# Patient Record
Sex: FEMALE | Race: WHITE | Employment: OTHER | URBAN - METROPOLITAN AREA
[De-identification: names, ages, dates, MRNs, and addresses within clinical notes are randomized per-mention and may not be internally consistent; named-entity substitution may affect disease eponyms.]

---

## 2017-01-02 DIAGNOSIS — E10.9 TYPE 1 DIABETES, HBA1C GOAL < 8% (H): Primary | ICD-10-CM

## 2017-01-02 DIAGNOSIS — I10 ESSENTIAL HYPERTENSION WITH GOAL BLOOD PRESSURE LESS THAN 140/90: ICD-10-CM

## 2017-01-02 NOTE — TELEPHONE ENCOUNTER
Amlodipine      Last Written Prescription Date: 6/18/16  Last Fill Quantity: 30, # refills: 0    Last Office Visit with G, P or [facility] prescribing provider:  7/22/16   Future Office Visit:    Next 5 appointments (look out 90 days)     Jan 09, 2017  8:35 AM   Office Visit with David Finney MD   [facility] ([facility])    [address] [facility] [address] [phone]                    BP Readings from Last 3 Encounters:   07/22/16 106/60   04/25/16 124/64   04/08/16 110/58

## 2017-01-04 DIAGNOSIS — E03.9 HYPOTHYROIDISM, UNSPECIFIED TYPE: ICD-10-CM

## 2017-01-04 DIAGNOSIS — E10.22 TYPE 1 DIABETES MELLITUS WITH DIABETIC CHRONIC KIDNEY DISEASE, UNSPECIFIED CKD STAGE (H): ICD-10-CM

## 2017-01-04 LAB
ALBUMIN SERPL-MCNC: 4.2 G/DL (ref 3.4–5)
ANION GAP SERPL CALCULATED.3IONS-SCNC: 8 MMOL/L (ref 3–14)
BUN SERPL-MCNC: 23 MG/DL (ref 7–30)
CALCIUM SERPL-MCNC: 9.7 MG/DL (ref 8.5–10.1)
CHLORIDE SERPL-SCNC: 98 MMOL/L (ref 94–109)
CO2 SERPL-SCNC: 30 MMOL/L (ref 20–32)
CREAT SERPL-MCNC: 1.42 MG/DL (ref 0.52–1.04)
GFR SERPL CREATININE-BSD FRML MDRD: 35 ML/MIN/1.7M2
GLUCOSE SERPL-MCNC: 233 MG/DL (ref 70–99)
HBA1C MFR BLD: 6.8 % (ref 4.3–6)
PHOSPHATE SERPL-MCNC: 2.2 MG/DL (ref 2.5–4.5)
POTASSIUM SERPL-SCNC: 3.8 MMOL/L (ref 3.4–5.3)
SODIUM SERPL-SCNC: 136 MMOL/L (ref 133–144)
T4 FREE SERPL-MCNC: 0.83 NG/DL (ref 0.76–1.46)
TSH SERPL DL<=0.005 MIU/L-ACNC: 28.57 MU/L (ref 0.4–4)

## 2017-01-04 PROCEDURE — 84443 ASSAY THYROID STIM HORMONE: CPT | Performed by: FAMILY MEDICINE

## 2017-01-04 PROCEDURE — 83036 HEMOGLOBIN GLYCOSYLATED A1C: CPT | Performed by: FAMILY MEDICINE

## 2017-01-04 PROCEDURE — 80069 RENAL FUNCTION PANEL: CPT | Performed by: FAMILY MEDICINE

## 2017-01-04 PROCEDURE — 36415 COLL VENOUS BLD VENIPUNCTURE: CPT | Performed by: FAMILY MEDICINE

## 2017-01-04 PROCEDURE — 84439 ASSAY OF FREE THYROXINE: CPT | Performed by: FAMILY MEDICINE

## 2017-01-04 RX ORDER — AMLODIPINE BESYLATE 2.5 MG/1
5 TABLET ORAL DAILY
Qty: 30 TABLET | Refills: 0 | Status: SHIPPED | OUTPATIENT
Start: 2017-01-04 | End: 2017-01-09

## 2017-01-09 ENCOUNTER — OFFICE VISIT (OUTPATIENT)
Dept: FAMILY MEDICINE | Facility: CLINIC | Age: 82
End: 2017-01-09
Payer: COMMERCIAL

## 2017-01-09 VITALS
OXYGEN SATURATION: 90 % | TEMPERATURE: 96.8 F | HEART RATE: 72 BPM | BODY MASS INDEX: 23.04 KG/M2 | DIASTOLIC BLOOD PRESSURE: 70 MMHG | WEIGHT: 130 LBS | HEIGHT: 63 IN | SYSTOLIC BLOOD PRESSURE: 136 MMHG

## 2017-01-09 DIAGNOSIS — N18.30 CKD (CHRONIC KIDNEY DISEASE) STAGE 3, GFR 30-59 ML/MIN (H): ICD-10-CM

## 2017-01-09 DIAGNOSIS — R60.0 PERIPHERAL EDEMA: ICD-10-CM

## 2017-01-09 DIAGNOSIS — E78.5 HYPERLIPIDEMIA LDL GOAL <100: ICD-10-CM

## 2017-01-09 DIAGNOSIS — E10.22 TYPE 1 DIABETES MELLITUS WITH DIABETIC CHRONIC KIDNEY DISEASE, UNSPECIFIED CKD STAGE (H): Primary | ICD-10-CM

## 2017-01-09 DIAGNOSIS — E03.9 HYPOTHYROIDISM, UNSPECIFIED TYPE: ICD-10-CM

## 2017-01-09 DIAGNOSIS — J42 CHRONIC BRONCHITIS, UNSPECIFIED CHRONIC BRONCHITIS TYPE (H): ICD-10-CM

## 2017-01-09 DIAGNOSIS — I10 ESSENTIAL HYPERTENSION WITH GOAL BLOOD PRESSURE LESS THAN 140/90: ICD-10-CM

## 2017-01-09 DIAGNOSIS — F33.0 MAJOR DEPRESSIVE DISORDER, RECURRENT EPISODE, MILD (H): ICD-10-CM

## 2017-01-09 PROCEDURE — 99214 OFFICE O/P EST MOD 30 MIN: CPT | Performed by: FAMILY MEDICINE

## 2017-01-09 RX ORDER — CLONIDINE HYDROCHLORIDE 0.1 MG/1
TABLET ORAL
Qty: 180 TABLET | Refills: 1 | Status: SHIPPED | OUTPATIENT
Start: 2017-01-09 | End: 2017-09-10

## 2017-01-09 RX ORDER — BUDESONIDE AND FORMOTEROL FUMARATE DIHYDRATE 160; 4.5 UG/1; UG/1
1 AEROSOL RESPIRATORY (INHALATION) 2 TIMES DAILY
Qty: 3 INHALER | Refills: 1 | Status: SHIPPED | OUTPATIENT
Start: 2017-01-09 | End: 2017-10-23

## 2017-01-09 RX ORDER — AMLODIPINE BESYLATE 2.5 MG/1
5 TABLET ORAL DAILY
Qty: 90 TABLET | Refills: 1 | Status: SHIPPED | OUTPATIENT
Start: 2017-01-09 | End: 2017-06-15

## 2017-01-09 RX ORDER — METOPROLOL SUCCINATE 50 MG/1
50 TABLET, EXTENDED RELEASE ORAL DAILY
Qty: 90 TABLET | Refills: 1 | Status: SHIPPED | OUTPATIENT
Start: 2017-01-09 | End: 2017-10-23

## 2017-01-09 RX ORDER — SIMVASTATIN 20 MG
20 TABLET ORAL AT BEDTIME
Qty: 90 TABLET | Refills: 1 | Status: SHIPPED | OUTPATIENT
Start: 2017-01-09 | End: 2017-07-09

## 2017-01-09 RX ORDER — FUROSEMIDE 20 MG
20 TABLET ORAL DAILY
Qty: 90 TABLET | Refills: 1 | Status: SHIPPED | OUTPATIENT
Start: 2017-01-09 | End: 2017-06-28

## 2017-01-09 RX ORDER — LEVOTHYROXINE SODIUM 100 UG/1
100 TABLET ORAL DAILY
Qty: 90 TABLET | Refills: 1 | Status: SHIPPED | OUTPATIENT
Start: 2017-01-09 | End: 2017-10-23

## 2017-01-09 RX ORDER — LOSARTAN POTASSIUM 25 MG/1
12.5 TABLET ORAL DAILY
Qty: 45 TABLET | Refills: 1 | Status: SHIPPED | OUTPATIENT
Start: 2017-01-09 | End: 2017-10-23

## 2017-01-09 RX ORDER — SERTRALINE HYDROCHLORIDE 25 MG/1
25 TABLET, FILM COATED ORAL DAILY
Qty: 90 TABLET | Refills: 1 | Status: SHIPPED | OUTPATIENT
Start: 2017-01-09 | End: 2017-10-23

## 2017-01-09 NOTE — NURSING NOTE
"Chief Complaint   Patient presents with     Diabetes       Initial /70 mmHg  Pulse 72  Temp(Src) 96.8  F (36  C) (Oral)  Ht 5' 3\" (1.6 m)  Wt 130 lb (58.968 kg)  BMI 23.03 kg/m2  SpO2 90% Estimated body mass index is 23.03 kg/(m^2) as calculated from the following:    Height as of this encounter: 5' 3\" (1.6 m).    Weight as of this encounter: 130 lb (58.968 kg).  BP completed using cuff size: lucy Marie CMA    "

## 2017-01-09 NOTE — PROGRESS NOTES
"SUBJECTIVE:  Claudia Presley, a 81 year old female scheduled an appointment to discuss the following issues:  Follow-up dm, htn, hypothyroid, copd, depression and high cholesterol.  closely involved in cares and patient doesn't drive.   Water intake ok. 2 decaf coffee in AM. No pop.   No feet changes. No bs<50 or >400. Blood sugar QID. Sleep ok. No chest pain or shortness of breath. No nausea, vomiting or diarrhea. No black or bloody stools.   Appetite Ok. Emotionally ok. 3 children 1 [place]/1 [place]. 1 son in [place].  Pill box set-up.    Past Medical History   Diagnosis Date     COPD (chronic obstructive pulmonary disease) (H)      Hypothyroidism      Peripheral edema      Osteopenia      Osteoporosis      Depression      Spinal stenosis      Type I (juvenile type) diabetes mellitus without mention of complication, not stated as uncontrolled      Diverticulosis of colon 2010     seen on colonoscopy     Hypertension        Past Surgical History   Procedure Laterality Date     Tonsillectomy       Breast lumpectomy, rt/lt       Breast Lumpectomy RT/LT     Phacoemulsification with standard intraocular lens implant  3/2007; 3/2007     left eye; right eye (STS)     Cataract iol, rt/lt         Family History   Problem Relation Age of Onset     Cancer - colorectal Father      PASSED AWAY FROM COLON CA     Alzheimer Disease Brother      PICKS DISEASE     Alzheimer Disease Sister      ON ARICEPT     Depression Sister      POST PARTUM     Respiratory Brother      COPD     Arthritis Sister      OSTEOPOROSIS Sister      Thyroid Disease Sister      Alzheimer Disease Mother      Breast Cancer Maternal Aunt        Social History   Substance Use Topics     Smoking status: Former Smoker     Types: Cigarettes     Quit date: 01/01/1998     Smokeless tobacco: Never Used      Comment: 1998     Alcohol Use: No       ROS:  OBJECTIVE:  /70 mmHg  Pulse 72  Temp(Src) 96.8  F (36  C) (Oral)  Ht 5' 3\" (1.6 m)  Wt 130 lb " (58.968 kg)  BMI 23.03 kg/m2  SpO2 90%  EXAM:  GENERAL APPEARANCE: healthy, alert and no distress  EYES: EOMI,  PERRL  NECK: no adenopathy, no asymmetry, masses, or scars and thyroid normal to palpation  RESP: lungs clear to auscultation - no rales, rhonchi or wheezes  CV: regular rates and rhythm, normal S1 S2, no S3 or S4 and no murmur, click or rub -  ABDOMEN:  soft, nontender, no HSM or masses and bowel sounds normal  MS: extremities normal- no gross deformities noted, no evidence of inflammation in joints, FROM in all extremities.  NEURO: good pulses/sensation bilateral feet.   PSYCH: mentation poor historian at times. and affect normal/bright  PSYCH: mild anxious.     ASSESSMENT / PLAN:  (E10.22,  N18.9) Type 1 diabetes mellitus with diabetic chronic kidney disease, unspecified CKD stage (H)  (primary encounter diagnosis)  Comment: stable  Plan: insulin glargine (LANTUS SOLOSTAR) 100 UNIT/ML         injection, insulin lispro (HUMALOG KWIKPEN) 100        UNIT/ML injection        Continue long term insulin and closely monitor. Recheck in 6 months      (E03.9) Hypothyroidism, unspecified type  Comment: a little high  Plan: levothyroxine (SYNTHROID/LEVOTHROID) 100 MCG         tablet        Concerns about not taking pills everyday and I don't want to over treatment with weight loss. Recheck in 6 months  Pill box.     (I10) Essential hypertension with goal blood pressure less than 140/90  Plan: amLODIPine (NORVASC) 2.5 MG tablet, metoprolol         (TOPROL-XL) 50 MG 24 hr tablet, cloNIDine         (CATAPRES) 0.1 MG tablet        Stable.     (J42) Chronic bronchitis, unspecified chronic bronchitis type (H)  Comment: stable  Plan: budesonide-formoterol (SYMBICORT) 160-4.5         MCG/ACT Inhaler            (F33.0) Major depressive disorder, recurrent episode, mild (H)  Comment: stable  Plan: sertraline (ZOLOFT) 25 MG tablet        Reveiwed risks and side effects of medication  Recheck in 6 months  Sooner if worse.  If SUICIAL IDEATION OR HOMOCIDAL IDEATION OR HERMELINDA TO ER.   Discussed dementia concerns with  today. More home health if needed. Call/email with questions/concerns    (R60.9) Peripheral edema  Comment: stable   Plan: furosemide (LASIX) 20 MG tablet        Prn especially with cr up.     (N18.3) CKD (chronic kidney disease) stage 3, GFR 30-59 ml/min  Comment: worse  Plan: losartan (COZAAR) 25 MG tablet        Recheck in 6 months  More water/less caffeine. Return to clinic sooner if urine changes/ rapids weight gain/worsening swelling. Monitor blood pressure for over treatment.    (E78.5) Hyperlipidemia LDL goal <100  Comment: stable in past  Plan: simvastatin (ZOCOR) 20 MG tablet        Recheck in 6 months      David Finney

## 2017-02-09 DIAGNOSIS — I10 ESSENTIAL HYPERTENSION WITH GOAL BLOOD PRESSURE LESS THAN 140/90: Primary | ICD-10-CM

## 2017-02-09 RX ORDER — AMLODIPINE BESYLATE 2.5 MG/1
5 TABLET ORAL DAILY
Qty: 90 TABLET | Refills: 1 | Status: CANCELLED | OUTPATIENT
Start: 2017-02-09

## 2017-02-09 NOTE — TELEPHONE ENCOUNTER
Amlodipine      Last Written Prescription Date: 1/2/17  Last Fill Quantity: 90, # refills: 1    Last Office Visit with [facility], P or [facility] prescribing provider:  2/10/17   Future Office Visit:    Next 5 appointments (look out 90 days)     Feb 10, 2017  1:20 PM   [facility]   [facility]    [address] [facility] [address] [phone]                    BP Readings from Last 3 Encounters:   01/09/17 136/70   07/22/16 106/60   04/25/16 124/64

## 2017-02-10 ENCOUNTER — OFFICE VISIT (OUTPATIENT)
Dept: FAMILY MEDICINE | Facility: CLINIC | Age: 82
End: 2017-02-10
Payer: COMMERCIAL

## 2017-02-10 VITALS
OXYGEN SATURATION: 90 % | BODY MASS INDEX: 23.03 KG/M2 | DIASTOLIC BLOOD PRESSURE: 56 MMHG | HEART RATE: 62 BPM | TEMPERATURE: 96.8 F | SYSTOLIC BLOOD PRESSURE: 102 MMHG | WEIGHT: 130 LBS

## 2017-02-10 DIAGNOSIS — E03.9 HYPOTHYROIDISM, UNSPECIFIED TYPE: ICD-10-CM

## 2017-02-10 DIAGNOSIS — F33.1 MAJOR DEPRESSIVE DISORDER, RECURRENT EPISODE, MODERATE (H): ICD-10-CM

## 2017-02-10 DIAGNOSIS — E10.22 TYPE 1 DIABETES MELLITUS WITH DIABETIC CHRONIC KIDNEY DISEASE, UNSPECIFIED CKD STAGE (H): Primary | ICD-10-CM

## 2017-02-10 DIAGNOSIS — Z23 NEED FOR PNEUMOCOCCAL VACCINE: ICD-10-CM

## 2017-02-10 DIAGNOSIS — I10 HYPERTENSION GOAL BP (BLOOD PRESSURE) < 140/90: ICD-10-CM

## 2017-02-10 PROCEDURE — 99214 OFFICE O/P EST MOD 30 MIN: CPT | Mod: 25 | Performed by: FAMILY MEDICINE

## 2017-02-10 PROCEDURE — 90670 PCV13 VACCINE IM: CPT | Performed by: FAMILY MEDICINE

## 2017-02-10 PROCEDURE — G0009 ADMIN PNEUMOCOCCAL VACCINE: HCPCS | Performed by: FAMILY MEDICINE

## 2017-02-10 NOTE — TELEPHONE ENCOUNTER
Left message on pharmacy vm that they should refill norvasc off of 1-9-17 prescription we sent them.  Philomena Collins RN

## 2017-02-10 NOTE — PROGRESS NOTES
SUBJECTIVE:  Claudia Presley, a 81 year old female scheduled an appointment to discuss the following issues:  Follow-up dm, htn, asthma, high cholesterol and memory difficulties.   Weight loss concerns - per  - appetite ok. No nausea, vomiting or diarrhea or black/blood stools. No abdominal pain. Sleep ok. zoloft helpful.   Overall breathing ok. Worse hot/humid. Rare albuterol needed. No chest pain. No leg swelling.    believes blood sugars a little too low. No LOSS OF CONSCIENCE. Average 100-150.   Patient will with pill box.  helpful.   Medical, social, surgical, and family histories reviewed.    ROS:      OBJECTIVE:  /56 mmHg  Pulse 62  Temp(Src) 96.8  F (36  C) (Oral)  Wt 130 lb (58.968 kg)  SpO2 90%  EXAM:  GENERAL APPEARANCE: healthy, alert and no distress  EYES: EOMI,  PERRL  NECK: no adenopathy, no asymmetry, masses, or scars and thyroid normal to palpation  RESP: lungs clear to auscultation - no rales, rhonchi or wheezes  CV: regular rates and rhythm, normal S1 S2, no S3 or S4 and no murmur, click or rub -  ABDOMEN:  soft, nontender, no HSM or masses and bowel sounds normal  MS: extremities normal- no gross deformities noted, no evidence of inflammation in joints, FROM in all extremities.  NEURO: Normal strength and tone, sensory exam grossly normal, mentation somewhat poor historian  speech normal  PSYCH: mentation appears normal and affect normal/bright    ASSESSMENT / PLAN:  (E10.22,  N18.9) Type 1 diabetes mellitus with diabetic chronic kidney disease, unspecified CKD stage (H)  (primary encounter diagnosis)  Comment: over treatment. Concerns about hypoglycemia  Plan: glucagon 1 MG kit, insulin glargine (LANTUS         SOLOSTAR) 100 UNIT/ML injection, insulin lispro        (HUMALOG KWIKPEN) 100 UNIT/ML injection        Lower ALL insulin by 2 units. Consider back to dm ed but patient found new changes too confusing with memory issues.  helpful. Consistent meal  times and continue closely monitor - patient frequently checking sugars and writing down. Recheck in 4 months  Sooner if worse/new issues. Prescription glucagon pen.         (F33.1) Major depressive disorder, recurrent episode, moderate (H)  Comment: stable  Plan: zoloft helpful. If SUICIAL IDEATION OR HOMOCIDAL IDEATION OR HERMELINDA TO ER. Recheck in 4 months      (E03.9) Hypothyroidism, unspecified type  Plan: Recheck in 4 months  Consider higher dosage if worse.     (Z23) Need for pneumococcal vaccine  Plan: PNEUMOCOCCAL CONJ VACCINE 13 VALENT IM (PREVNAR        13)            David Finney MD

## 2017-02-10 NOTE — NURSING NOTE
"Chief Complaint   Patient presents with     Diabetes       Initial /56 mmHg  Pulse 62  Temp(Src) 96.8  F (36  C) (Oral)  Wt 130 lb (58.968 kg)  SpO2 90% Estimated body mass index is 23.03 kg/(m^2) as calculated from the following:    Height as of 1/9/17: 5' 3\" (1.6 m).    Weight as of this encounter: 130 lb (58.968 kg).  Medication Reconciliation: complete   Philomena Marie CMA  Screening Questionnaire for Adult Immunization    Are you sick today?   No   Do you have allergies to medications, food, a vaccine component or latex?   No   Have you ever had a serious reaction after receiving a vaccination?   No   Do you have a long-term health problem with heart disease, lung disease, asthma, kidney disease, metabolic disease (e.g. diabetes), anemia, or other blood disorder?   diabetes   Do you have cancer, leukemia, HIV/AIDS, or any other immune system problem?   No   In the past 3 months, have you taken medications that affect  your immune system, such as prednisone, other steroids, or anticancer drugs; drugs for the treatment of rheumatoid arthritis, Crohn s disease, or psoriasis; or have you had radiation treatments?   No   Have you had a seizure, or a brain or other nervous system problem?   No   During the past year, have you received a transfusion of blood or blood     products, or been given immune (gamma) globulin or antiviral drug?   No   For women: Are you pregnant or is there a chance you could become        pregnant during the next month?   No   Have you received any vaccinations in the past 4 weeks?   No     Immunization questionnaire was positive for at least one answer.  Notified diabetes.      MNVFC doesn't apply on this patient    Per orders of Dr. Finney, injection of Prevnar 13  given by Philomena Marie. Patient instructed to remain in clinic for 20 minutes afterwards, and to report any adverse reaction to me immediately.       Screening performed by Philomena Marie on 2/10/2017 at 1:30 PM.    "

## 2017-03-13 DIAGNOSIS — E10.22 TYPE 1 DIABETES MELLITUS WITH DIABETIC CHRONIC KIDNEY DISEASE (H): Primary | ICD-10-CM

## 2017-03-13 NOTE — TELEPHONE ENCOUNTER
One touch ultra blue in vitro strip         Last Written Prescription Date: 11/21/16  Last Fill Quantity: 3 boxes, # refills: 12  Last Office Visit with G, P or [facility] prescribing provider:  2/10/17        BP Readings from Last 3 Encounters:   02/10/17 102/56   01/09/17 136/70   07/22/16 106/60     Lab Results   Component Value Date    MICROL 65 04/08/2016     No results found for: MICROALBUMIN  Creatinine   Date Value Ref Range Status   01/04/2017 1.42 (H) 0.52 - 1.04 mg/dL Final   ]  GFR Estimate   Date Value Ref Range Status   01/04/2017 35 (L) >60 mL/min/1.7m2 Final     Comment:     Non  GFR Calc   07/15/2016 48 (L) >60 mL/min/1.7m2 Final     Comment:     Non  GFR Calc   07/28/2015 42 (L) >60 mL/min/1.7m2 Final     Comment:     Non  GFR Calc     GFR Estimate If Black   Date Value Ref Range Status   01/04/2017 43 (L) >60 mL/min/1.7m2 Final     Comment:      GFR Calc   07/15/2016 58 (L) >60 mL/min/1.7m2 Final     Comment:      GFR Calc   07/28/2015 51 (L) >60 mL/min/1.7m2 Final     Comment:      GFR Calc     Lab Results   Component Value Date    CHOL 163 07/15/2016     Lab Results   Component Value Date    HDL 80 07/15/2016     Lab Results   Component Value Date    LDL 68 07/15/2016     Lab Results   Component Value Date    TRIG 75 07/15/2016     Lab Results   Component Value Date    CHOLHDLRATIO 2.2 07/28/2015     Lab Results   Component Value Date    AST 40 07/24/2012     Lab Results   Component Value Date    ALT 10 07/24/2012     Lab Results   Component Value Date    A1C 6.8 01/04/2017    A1C 7.9 07/15/2016    A1C 8.1 02/23/2016    A1C 7.0 07/28/2015    A1C 7.3 02/16/2015     Potassium   Date Value Ref Range Status   01/04/2017 3.8 3.4 - 5.3 mmol/L Final

## 2017-04-19 ENCOUNTER — TELEPHONE (OUTPATIENT)
Dept: FAMILY MEDICINE | Facility: CLINIC | Age: 82
End: 2017-04-19

## 2017-04-19 DIAGNOSIS — E10.22 TYPE 1 DIABETES MELLITUS WITH DIABETIC CHRONIC KIDNEY DISEASE, UNSPECIFIED CKD STAGE (H): ICD-10-CM

## 2017-04-19 NOTE — TELEPHONE ENCOUNTER
Pt. Is requesting a refill of her Lantus diabetic pens please. Please change her pharmacy from our [facility] to the [facility] on [address] [facility] in [place]. Please call the patient when this has been done.    Thank you

## 2017-04-19 NOTE — TELEPHONE ENCOUNTER
Should be 11 units - I'm concerned about over treatment and hypoglycemia so down from 13 to 11 now thanks. David Finney MD recheck labs/meds in 2 months.

## 2017-04-19 NOTE — TELEPHONE ENCOUNTER
Refilled per RN refill protocol.  Left message on vm that refill was done to [facility].  Philomena Collins RN

## 2017-06-06 ENCOUNTER — OFFICE VISIT (OUTPATIENT)
Dept: FAMILY MEDICINE | Facility: CLINIC | Age: 82
End: 2017-06-06
Payer: COMMERCIAL

## 2017-06-06 VITALS
SYSTOLIC BLOOD PRESSURE: 130 MMHG | OXYGEN SATURATION: 98 % | WEIGHT: 138 LBS | DIASTOLIC BLOOD PRESSURE: 80 MMHG | TEMPERATURE: 97.2 F | HEART RATE: 62 BPM | BODY MASS INDEX: 24.45 KG/M2

## 2017-06-06 DIAGNOSIS — L03.115 CELLULITIS OF LEG, RIGHT: ICD-10-CM

## 2017-06-06 DIAGNOSIS — R21 RASH: Primary | ICD-10-CM

## 2017-06-06 PROCEDURE — 99214 OFFICE O/P EST MOD 30 MIN: CPT | Performed by: FAMILY MEDICINE

## 2017-06-06 RX ORDER — TRIAMCINOLONE ACETONIDE 0.25 MG/ML
LOTION TOPICAL 2 TIMES DAILY PRN
Qty: 60 ML | Refills: 1 | Status: SHIPPED | OUTPATIENT
Start: 2017-06-06 | End: 2017-07-10

## 2017-06-06 RX ORDER — CETIRIZINE HYDROCHLORIDE 10 MG/1
10 TABLET ORAL EVERY EVENING
Qty: 30 TABLET | Refills: 1 | Status: SHIPPED | OUTPATIENT
Start: 2017-06-06 | End: 2017-08-14

## 2017-06-06 RX ORDER — DOXYCYCLINE 100 MG/1
100 CAPSULE ORAL 2 TIMES DAILY
Qty: 20 CAPSULE | Refills: 0 | Status: SHIPPED | OUTPATIENT
Start: 2017-06-06 | End: 2017-06-15

## 2017-06-06 NOTE — PROGRESS NOTES
SUBJECTIVE:  Claudia Presley, a 81 year old female scheduled an appointment to discuss the following issues:  Rash  Started right leg now spreading to arms/other leg. No pain. Itching. No fevers or chills.   Similar issues last fall.   Dog and  - no rashes. right legs worse past couple weeks.   No shortness of breath. No nausea, vomiting or diarrhea. Appetite ok. Blood sugars ok.   Medical, social, surgical, and family histories reviewed.    ROS:    OBJECTIVE:  /80  Pulse 62  Temp 97.2  F (36.2  C) (Oral)  Wt 138 lb (62.6 kg)  SpO2 98%  BMI 24.45 kg/m2  EXAM:  GENERAL APPEARANCE: healthy, alert and no distress  RESP: lungs clear to auscultation - no rales, rhonchi or wheezes  CV: regular rates and rhythm, normal S1 S2, no S3 or S4 and no murmur, click or rub -  ABDOMEN:  soft, nontender, no HSM or masses and bowel sounds normal  MS: extremities normal- no gross deformities noted, no evidence of inflammation in joints, FROM in all extremities.  SKIN: redness/weeping right mid shin with mild swelling/tenderness. Macular rash on bilateral forearms. Some pickers nodules  PSYCH: mentation appears normal and affect normal/bright    ASSESSMENT / PLAN:  (R21) Rash  (primary encounter diagnosis)  Comment: unclear etiology  Plan: cetirizine (ZYRTEC) 10 MG tablet, triamcinolone        acetonide 0.025 % LOTN, DERMATOLOGY REFERRAL        Avoid scratching and follow-up dermatology. Limit bathing/soap. Expected course and warning signs reviewed. Call/email with questions/concerns. .Reveiwed risks and side effects of medication      (L03.115) Cellulitis of leg, right  Plan: doxycycline (VIBRAMYCIN) 100 MG capsule,         DERMATOLOGY REFERRAL        Reveiwed risks and side effects of medication  Avoid scratching. Elevate and kurlex wrap done. To ER if worsening pain/swelling/ fevers or chills.   Return to clinic 6 weeks dm/meds/labs - sooner if new issues/concerns.     David Finney MD

## 2017-06-06 NOTE — NURSING NOTE
"Chief Complaint   Patient presents with     Derm Problem     arms and legs        Initial /80  Pulse 62  Temp 97.2  F (36.2  C) (Oral)  Wt 138 lb (62.6 kg)  SpO2 98%  BMI 24.45 kg/m2 Estimated body mass index is 24.45 kg/(m^2) as calculated from the following:    Height as of 1/9/17: 5' 3\" (1.6 m).    Weight as of this encounter: 138 lb (62.6 kg).  Medication Reconciliation: complete   Philomena Marie, CMA    "

## 2017-06-06 NOTE — MR AVS SNAPSHOT
After Visit Summary   6/6/2017    Claudia Presley    MRN: [id number]           Patient Information     Date Of Birth          1935        Visit Information        Provider Department      6/6/2017 10:50 AM [place] Finney MD [facility]        Today's Diagnoses     Rash    -  1    Cellulitis of leg, right           Follow-ups after your visit        Additional Services     DERMATOLOGY REFERRAL       Your provider has referred you to: [facility] - Mee Torres [phone]   [email or web address]    Please be aware that coverage of these services is subject to the terms and limitations of your health insurance plan.  Call member services at your health plan with any benefit or coverage questions.      Please bring the following with you to your appointment:    (1) Any X-Rays, CTs or MRIs which have been performed.  Contact the facility where they were done to arrange for  prior to your scheduled appointment.    (2) List of current medications  (3) This referral request   (4) Any documents/labs given to you for this referral                  Who to contact     If you have questions or need follow up information about today's clinic visit or your schedule please contact [facility] directly at [phone].  Normal or non-critical lab and imaging results will be communicated to you by MyChart, letter or phone within 4 business days after the clinic has received the results. If you do not hear from us within 7 days, please contact the clinic through MyChart or phone. If you have a critical or abnormal lab result, we will notify you by phone as soon as possible.  Submit refill requests through [employer] or call your pharmacy and they will forward the refill request to us. Please allow 3 business days for your refill to be completed.          Additional Information About Your Visit        MyChart Information     [employer] lets you  "send messages to your doctor, view your test results, renew your prescriptions, schedule appointments and more. To sign up, go to [email or web address] . Click on \"Log in\" on the left side of the screen, which will take you to the Welcome page. Then click on \"Sign up Now\" on the right side of the page.     You will be asked to enter the access code listed below, as well as some personal information. Please follow the directions to create your username and password.     Your access code is: [email or web address]  Expires: 2017 11:14 AM     Your access code will  in 90 days. If you need help or a new code, please call your [place] clinic or [phone].        Care EveryWhere ID     This is your Care EveryWhere ID. This could be used by other organizations to access your [place] medical records  [id number]        Your Vitals Were     Pulse Temperature Pulse Oximetry BMI (Body Mass Index)          62 97.2  F (36.2  C) (Oral) 98% 24.45 kg/m2         Blood Pressure from Last 3 Encounters:   17 130/80   02/10/17 102/56   17 136/70    Weight from Last 3 Encounters:   17 138 lb (62.6 kg)   02/10/17 130 lb (59 kg)   17 130 lb (59 kg)              We Performed the Following     DERMATOLOGY REFERRAL          Today's Medication Changes          These changes are accurate as of: 17 11:14 AM.  If you have any questions, ask your nurse or doctor.               Start taking these medicines.        Dose/Directions    cetirizine 10 MG tablet   Commonly known as:  zyrTEC   Used for:  Rash        Dose:  10 mg   Take 1 tablet (10 mg) by mouth every evening As needed for itching/rash   Quantity:  30 tablet   Refills:  1       doxycycline 100 MG capsule   Commonly known as:  VIBRAMYCIN   Used for:  Cellulitis of leg, right        Dose:  100 mg   Take 1 capsule (100 mg) by mouth 2 times daily Take the medication with food.   Quantity:  20 capsule   Refills:  0       triamcinolone acetonide 0.025 % Lotn "   Used for:  Rash        Externally apply topically 2 times daily as needed   Quantity:  60 mL   Refills:  1            Where to get your medicines      These medications were sent to [id number] - [place], [place] - [address]  [address] [address]     Phone:  [phone]     cetirizine 10 MG tablet    doxycycline 100 MG capsule    triamcinolone acetonide 0.025 % Lotn                Primary Care Provider Office Phone # Fax #    [place] Finney [phone]       [facility] [address] [address] [facility] [address] [address]        Thank you!     Thank you for choosing [facility]  for your care. Our goal is always to provide you with excellent care. Hearing back from our patients is one way we can continue to improve our services. Please take a few minutes to complete the written survey that you may receive in the mail after your visit with us. Thank you!             Your Updated Medication List - Protect others around you: Learn how to safely use, store and throw away your medicines at www.disposemymeds.org.          This list is accurate as of: 6/6/17 11:14 AM.  Always use your most recent med list.                   Brand Name Dispense Instructions for use    amLODIPine 2.5 MG tablet    NORVASC    90 tablet    Take 2 tablets (5 mg) by mouth daily Pharmacy ok to hold prescription until due       aspirin 81 MG tablet      1 TABLET DAILY       blood glucose monitoring test strip    no brand specified    3 Box    In Vitro route 4 times daily. Dispense One Touch Ultra test strips or per patient/insurance preference       budesonide-formoterol 160-4.5 MCG/ACT Inhaler    SYMBICORT    3 Inhaler    Inhale 1 puff into the lungs 2 times daily Daily inhaler for copd Pharmacy ok to hold prescription until due       cetirizine 10 MG tablet    zyrTEC    30 tablet    Take 1 tablet (10 mg) by mouth every evening As needed for itching/rash       cloNIDine 0.1  MG tablet    CATAPRES    180 tablet    ONE tablet twice  DAILY for blood pressure Pharmacy ok to hold prescription until due       doxycycline 100 MG capsule    VIBRAMYCIN    20 capsule    Take 1 capsule (100 mg) by mouth 2 times daily Take the medication with food.       furosemide 20 MG tablet    LASIX    90 tablet    Take 1 tablet (20 mg) by mouth daily As needed for blood pressure or leg swelling Pharmacy ok to hold prescription until due       glucagon 1 MG kit     1 mg    Inject 1 mg Subcutaneous once for 1 dose For severe hypoglycemia       HumaLOG KWIKpen 100 UNIT/ML injection   Generic drug:  insulin lispro     1 mL    11 units before breakfast, 11 units before lunch, 11 units before dinner 3 month supply please       insulin glargine 100 UNIT/ML injection    LANTUS SOLOSTAR    3 mL    Inject 11 Units Subcutaneous At Bedtime       * insulin pen needle 29G X 12.7MM     400 each    1 Units 4 times daily (before meals and nightly). Please dispense Rachel Needles       * insulin pen needle 32G X 4 MM    BD RACHEL U/F    400 each    Use four times daily.       ipratropium 17 MCG/ACT Inhaler    ATROVENT HFA    12.9 Inhaler    Inhale 2 puffs into the lungs 4 times daily Pharmacy ok to hold prescription until due       levothyroxine 100 MCG tablet    SYNTHROID/LEVOTHROID    90 tablet    Take 1 tablet (100 mcg) by mouth daily For thyroid Pharmacy ok to hold prescription until due       [employer] FINEPOINT LANCETS Misc     100 each    Use to test blood sugars 4 times daily or as directed.  Brand per patient/insurance preference       losartan 25 MG tablet    COZAAR    45 tablet    Take 0.5 tablets (12.5 mg) by mouth daily For blood pressure with breakfast       meclizine 25 MG tablet    ANTIVERT     1 TABLET 3 TIMES DAILY AS NEEDED       metoprolol 50 MG 24 hr tablet    TOPROL-XL    90 tablet    Take 1 tablet (50 mg) by mouth daily For blood pressure and lowers pulse. Pharmacy ok to hold prescription until due        sertraline 25 MG tablet    ZOLOFT    90 tablet    Take 1 tablet (25 mg) by mouth daily for energy/depression and anxiety       simvastatin 20 MG tablet    ZOCOR    90 tablet    Take 1 tablet (20 mg) by mouth At Bedtime For cholesterol Pharmacy ok to hold prescription until due       triamcinolone acetonide 0.025 % Lotn     60 mL    Externally apply topically 2 times daily as needed       TYLENOL CAPS 500 MG OR      1 CAPSULE EVERY 4 HOURS AS NEEDED       * Notice:  This list has 2 medication(s) that are the same as other medications prescribed for you. Read the directions carefully, and ask your doctor or other care provider to review them with you.

## 2017-06-15 DIAGNOSIS — L03.115 CELLULITIS OF LEG, RIGHT: ICD-10-CM

## 2017-06-15 DIAGNOSIS — I10 ESSENTIAL HYPERTENSION WITH GOAL BLOOD PRESSURE LESS THAN 140/90: ICD-10-CM

## 2017-06-16 DIAGNOSIS — E10.9 TYPE 1 DIABETES, HBA1C GOAL < 8% (H): ICD-10-CM

## 2017-06-16 RX ORDER — AMLODIPINE BESYLATE 2.5 MG/1
TABLET ORAL
Qty: 90 TABLET | Refills: 0 | Status: SHIPPED | OUTPATIENT
Start: 2017-06-16 | End: 2017-08-28

## 2017-06-16 RX ORDER — PEN NEEDLE, DIABETIC 32GX 5/32"
NEEDLE, DISPOSABLE MISCELLANEOUS
Qty: 200 EACH | Refills: 0 | Status: SHIPPED | OUTPATIENT
Start: 2017-06-16 | End: 2017-07-23

## 2017-06-16 RX ORDER — DOXYCYCLINE 100 MG/1
CAPSULE ORAL
Qty: 20 CAPSULE | Refills: 0 | Status: SHIPPED | OUTPATIENT
Start: 2017-06-16 | End: 2017-07-10

## 2017-06-16 NOTE — TELEPHONE ENCOUNTER
Called and informed doxycycline was refilled but that she needs to see dermatology if rash persisits.Nadia Jc MA/TC

## 2017-06-19 ENCOUNTER — OFFICE VISIT (OUTPATIENT)
Dept: FAMILY MEDICINE | Facility: CLINIC | Age: 82
End: 2017-06-19
Payer: COMMERCIAL

## 2017-06-19 VITALS
TEMPERATURE: 97 F | WEIGHT: 136 LBS | OXYGEN SATURATION: 95 % | BODY MASS INDEX: 24.09 KG/M2 | SYSTOLIC BLOOD PRESSURE: 128 MMHG | HEART RATE: 70 BPM | DIASTOLIC BLOOD PRESSURE: 72 MMHG

## 2017-06-19 DIAGNOSIS — I87.2 STASIS DERMATITIS OF BOTH LEGS: Primary | ICD-10-CM

## 2017-06-19 PROCEDURE — 99213 OFFICE O/P EST LOW 20 MIN: CPT | Performed by: FAMILY MEDICINE

## 2017-06-19 RX ORDER — TRIAMCINOLONE ACETONIDE 1 MG/G
CREAM TOPICAL
Qty: 80 G | Refills: 0 | Status: SHIPPED | OUTPATIENT
Start: 2017-06-19 | End: 2017-07-10

## 2017-06-19 NOTE — PROGRESS NOTES
SUBJECTIVE:                                                    Claudia Presley is a 81 year old female who presents to clinic today for the following health issues:      Skin itching ,started about 6 months ago ,all over ,bad on right leg ,tried antibiotic  ,tried ,lotion,and pill for itching   SUBJECTIVE:  81 year old.The patient has a history of itchy skin all over.  This started 6 months ago.  Associated symptoms are scratches on back.  Brought on by unknown .  Better with nothing. Lower legs has .erythema. ROS no change in soaps      Reviewed health maintenance  Patient Active Problem List   Diagnosis     Hypothyroidism     HYPERLIPIDEMIA LDL GOAL <100     Encounter for counseling     Hypertension goal BP (blood pressure) < 140/90     Moderate major depression (H)     Pseudophakia, ou     CAD (coronary artery disease)     Type 1 diabetes, HbA1c goal < 8% (H)     COPD (chronic obstructive pulmonary disease) (H)     Spinal stenosis     Diverticulosis of colon     Advanced directives, counseling/discussion     CKD (chronic kidney disease) stage 3, GFR 30-59 ml/min     Peripheral edema     Osteoporosis     Personal history of smoking     BPPV (benign paroxysmal positional vertigo)     ABMD (anterior basement membrane dystrophy), ou     Hypothyroidism, unspecified hypothyroidism type     Memory loss     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)     Major depressive disorder, recurrent episode, mild (H)     Chronic bronchitis, unspecified chronic bronchitis type (H)     Essential hypertension with goal blood pressure less than 140/90     Type 1 diabetes mellitus with diabetic chronic kidney disease, unspecified CKD stage (H)     Hypothyroidism, unspecified type     Macular drusen, bilateral     Posterior vitreous detachment, bilateral     Major depressive disorder, recurrent episode, moderate (H)     Past Medical History:   Diagnosis Date     COPD (chronic obstructive pulmonary disease)  (H)      Depression      Diverticulosis of colon 2010    seen on colonoscopy     Hypertension      Hypothyroidism      Osteopenia      Osteoporosis      Peripheral edema      Spinal stenosis      Type I (juvenile type) diabetes mellitus without mention of complication, not stated as uncontrolled        OBJECTIVE:  no apparent distress  /72  Pulse 70  Temp 97  F (36.1  C) (Oral)  Wt 136 lb (61.7 kg)  SpO2 95%  BMI 24.09 kg/m2  Bilateral lower legs with erythema and scaling  LUNGS:  CTA B/L, no wheezing or crackles.   Cardiovascular: negative, PMI normal. No lifts, heaves, or thrills. RRR. No murmurs, clicks gallops or rub   Gastrointestinal: Abdomen soft, non-tender. BS normal. No masses, organomegaly       ICD-10-CM    1. Stasis dermatitis of both legs I83.11 triamcinolone (KENALOG) 0.1 % cream    I83.12     PLAN: Re check 2 weeks

## 2017-06-19 NOTE — MR AVS SNAPSHOT
"              After Visit Summary   2017    Claudia Presley    MRN: [id number]           Patient Information     Date Of Birth          1935        Visit Information        Provider Department      2017 2:15 PM Peter Mahan MD [facility]        Today's Diagnoses     Stasis dermatitis of both legs    -  1       Follow-ups after your visit        Who to contact     If you have questions or need follow up information about today's clinic visit or your schedule please contact [facility] directly at [phone].  Normal or non-critical lab and imaging results will be communicated to you by [employer], letter or phone within 4 business days after the clinic has received the results. If you do not hear from us within 7 days, please contact the clinic through [email or web address] or phone. If you have a critical or abnormal lab result, we will notify you by phone as soon as possible.  Submit refill requests through [employer] or call your pharmacy and they will forward the refill request to us. Please allow 3 business days for your refill to be completed.          Additional Information About Your Visit        MyChart Information     [employer] lets you send messages to your doctor, view your test results, renew your prescriptions, schedule appointments and more. To sign up, go to [email or web address] . Click on \"Log in\" on the left side of the screen, which will take you to the Welcome page. Then click on \"Sign up Now\" on the right side of the page.     You will be asked to enter the access code listed below, as well as some personal information. Please follow the directions to create your username and password.     Your access code is: [email or web address]  Expires: 2017 11:14 AM     Your access code will  in 90 days. If you need help or a new code, please call your [facility] or [phone].        Care EveryWhere ID     This is your Care EveryWhere ID. This could be used by " other organizations to access your [place] medical records  [id number]        Your Vitals Were     Pulse Temperature Pulse Oximetry BMI (Body Mass Index)          70 97  F (36.1  C) (Oral) 95% 24.09 kg/m2         Blood Pressure from Last 3 Encounters:   06/19/17 128/72   06/06/17 130/80   02/10/17 102/56    Weight from Last 3 Encounters:   06/19/17 136 lb (61.7 kg)   06/06/17 138 lb (62.6 kg)   02/10/17 130 lb (59 kg)              Today, you had the following     No orders found for display         Today's Medication Changes          These changes are accurate as of: 6/19/17  2:43 PM.  If you have any questions, ask your nurse or doctor.               These medicines have changed or have updated prescriptions.        Dose/Directions    * triamcinolone acetonide 0.025 % Lotn   This may have changed:  Another medication with the same name was added. Make sure you understand how and when to take each.   Used for:  Rash   Changed by:  [place] Finney MD        Externally apply topically 2 times daily as needed   Quantity:  60 mL   Refills:  1       * triamcinolone 0.1 % cream   Commonly known as:  KENALOG   This may have changed:  You were already taking a medication with the same name, and this prescription was added. Make sure you understand how and when to take each.   Used for:  Stasis dermatitis of both legs   Changed by:  Peter Mahan MD        Apply sparingly to affected area three times daily as needed   Quantity:  80 g   Refills:  0       * Notice:  This list has 2 medication(s) that are the same as other medications prescribed for you. Read the directions carefully, and ask your doctor or other care provider to review them with you.         Where to get your medicines      These medications were sent to [id number] - [place], [place] - [address]  [address] [address]     Phone:  [phone]     triamcinolone 0.1 % cream                Primary Care  Provider Office Phone # Fax #    [place] Finney [phone]       [facility] [address] [facility] [address]        Thank you!     Thank you for choosing [facility]  for your care. Our goal is always to provide you with excellent care. Hearing back from our patients is one way we can continue to improve our services. Please take a few minutes to complete the written survey that you may receive in the mail after your visit with us. Thank you!             Your Updated Medication List - Protect others around you: Learn how to safely use, store and throw away your medicines at www.disposemymeds.org.          This list is accurate as of: 6/19/17  2:43 PM.  Always use your most recent med list.                   Brand Name Dispense Instructions for use    amLODIPine 2.5 MG tablet    NORVASC    90 tablet    TAKE TWO TABLETS BY MOUTH DAILY       aspirin 81 MG tablet      1 TABLET DAILY       blood glucose monitoring test strip    no brand specified    3 Box    In Vitro route 4 times daily. Dispense One Touch Ultra test strips or per patient/insurance preference       budesonide-formoterol 160-4.5 MCG/ACT Inhaler    SYMBICORT    3 Inhaler    Inhale 1 puff into the lungs 2 times daily Daily inhaler for copd Pharmacy ok to hold prescription until due       cetirizine 10 MG tablet    zyrTEC    30 tablet    Take 1 tablet (10 mg) by mouth every evening As needed for itching/rash       cloNIDine 0.1 MG tablet    CATAPRES    180 tablet    ONE tablet twice  DAILY for blood pressure Pharmacy ok to hold prescription until due       doxycycline 100 MG capsule    VIBRAMYCIN    20 capsule    TAKE ONE CAPSULE BY MOUTH TWICE DAILY. TAKE WITH FOOD.       furosemide 20 MG tablet    LASIX    90 tablet    Take 1 tablet (20 mg) by mouth daily As needed for blood pressure or leg swelling Pharmacy ok to hold prescription until due       glucagon 1 MG kit     1 mg    Inject 1 mg Subcutaneous  once for 1 dose For severe hypoglycemia       HumaLOG KWIKpen 100 UNIT/ML injection   Generic drug:  insulin lispro     1 mL    11 units before breakfast, 11 units before lunch, 11 units before dinner 3 month supply please       insulin glargine 100 UNIT/ML injection    LANTUS SOLOSTAR    3 mL    Inject 11 Units Subcutaneous At Bedtime       * insulin pen needle 29G X 12.7MM     400 each    1 Units 4 times daily (before meals and nightly). Please dispense Rachel Needles       * insulin pen needle 32G X 4 MM    BD RACHEL U/F    400 each    Use four times daily.       * BD RACHEL U/F 32G X 4 MM   Generic drug:  insulin pen needle     200 each    USE 4 TIMES DAILY       ipratropium 17 MCG/ACT Inhaler    ATROVENT HFA    12.9 Inhaler    Inhale 2 puffs into the lungs 4 times daily Pharmacy ok to hold prescription until due       levothyroxine 100 MCG tablet    SYNTHROID/LEVOTHROID    90 tablet    Take 1 tablet (100 mcg) by mouth daily For thyroid Pharmacy ok to hold prescription until due       [employer] FINEPOINT LANCETS Misc     100 each    Use to test blood sugars 4 times daily or as directed.  Brand per patient/insurance preference       losartan 25 MG tablet    COZAAR    45 tablet    Take 0.5 tablets (12.5 mg) by mouth daily For blood pressure with breakfast       meclizine 25 MG tablet    ANTIVERT     1 TABLET 3 TIMES DAILY AS NEEDED       metoprolol 50 MG 24 hr tablet    TOPROL-XL    90 tablet    Take 1 tablet (50 mg) by mouth daily For blood pressure and lowers pulse. Pharmacy ok to hold prescription until due       sertraline 25 MG tablet    ZOLOFT    90 tablet    Take 1 tablet (25 mg) by mouth daily for energy/depression and anxiety       simvastatin 20 MG tablet    ZOCOR    90 tablet    Take 1 tablet (20 mg) by mouth At Bedtime For cholesterol Pharmacy ok to hold prescription until due       * triamcinolone acetonide 0.025 % Lotn     60 mL    Externally apply topically 2 times daily as needed       * triamcinolone 0.1  % cream    KENALOG    80 g    Apply sparingly to affected area three times daily as needed       TYLENOL CAPS 500 MG OR      1 CAPSULE EVERY 4 HOURS AS NEEDED       * Notice:  This list has 5 medication(s) that are the same as other medications prescribed for you. Read the directions carefully, and ask your doctor or other care provider to review them with you.

## 2017-06-19 NOTE — NURSING NOTE
"Chief Complaint   Patient presents with     Derm Problem       Initial /72  Pulse 70  Temp 97  F (36.1  C) (Oral)  Wt 136 lb (61.7 kg)  SpO2 95%  BMI 24.09 kg/m2 Estimated body mass index is 24.09 kg/(m^2) as calculated from the following:    Height as of 1/9/17: 5' 3\" (1.6 m).    Weight as of this encounter: 136 lb (61.7 kg).  Medication Reconciliation: complete  "

## 2017-07-09 DIAGNOSIS — E78.5 HYPERLIPIDEMIA LDL GOAL <100: ICD-10-CM

## 2017-07-10 ENCOUNTER — OFFICE VISIT (OUTPATIENT)
Dept: FAMILY MEDICINE | Facility: CLINIC | Age: 82
End: 2017-07-10
Payer: COMMERCIAL

## 2017-07-10 VITALS
BODY MASS INDEX: 23.38 KG/M2 | DIASTOLIC BLOOD PRESSURE: 67 MMHG | WEIGHT: 132 LBS | TEMPERATURE: 97.6 F | HEART RATE: 60 BPM | SYSTOLIC BLOOD PRESSURE: 102 MMHG | OXYGEN SATURATION: 94 %

## 2017-07-10 DIAGNOSIS — E10.22 TYPE 1 DIABETES MELLITUS WITH DIABETIC CHRONIC KIDNEY DISEASE, UNSPECIFIED CKD STAGE (H): ICD-10-CM

## 2017-07-10 DIAGNOSIS — L30.9 DERMATITIS: Primary | ICD-10-CM

## 2017-07-10 PROCEDURE — 99214 OFFICE O/P EST MOD 30 MIN: CPT | Performed by: FAMILY MEDICINE

## 2017-07-10 RX ORDER — FLUOCINONIDE 0.5 MG/G
CREAM TOPICAL
Qty: 120 G | Refills: 0 | Status: SHIPPED | OUTPATIENT
Start: 2017-07-10 | End: 2018-02-16

## 2017-07-10 NOTE — NURSING NOTE
"Chief Complaint   Patient presents with     Derm Problem     right leg       Initial /67  Pulse 60  Temp 97.6  F (36.4  C) (Oral)  Wt 132 lb (59.9 kg)  SpO2 94%  BMI 23.38 kg/m2 Estimated body mass index is 23.38 kg/(m^2) as calculated from the following:    Height as of 1/9/17: 5' 3\" (1.6 m).    Weight as of this encounter: 132 lb (59.9 kg).  Medication Reconciliation: complete   Philomena Marie CMA    "

## 2017-07-10 NOTE — PROGRESS NOTES
SUBJECTIVE:  Claudia Presley, a 82 year old female scheduled an appointment to discuss the following issues:  Follow-up rash on legs - likely statis dermatitis. Patient given prescription triamicinolone cream 3 weeks ago. Patient given doxycycline for cellulitis in past too.   right leg rash x 6months+. Lots of scratching. No anti-histamines.   No baths. Limit showers and not too hot and not a lot of soap.  No fevers or chills.   Treatment cream only about once/day. Patient with mild dementia and history non-compliance. No moistorizer.   No pain but itching.   Blood sugars running ok. Here with . No sugars <50 or >350. No more LOSS OF CONSCIENCE issues with old insulin regiment.   Medical, social, surgical, and family histories reviewed.    ROS:    OBJECTIVE:  /67  Pulse 60  Temp 97.6  F (36.4  C) (Oral)  Wt 132 lb (59.9 kg)  SpO2 94%  BMI 23.38 kg/m2  EXAM:  GENERAL APPEARANCE: healthy, alert and no distress  NECK: no adenopathy, no asymmetry, masses, or scars and thyroid normal to palpation  RESP: lungs clear to auscultation - no rales, rhonchi or wheezes  CV: regular rates and rhythm, normal S1 S2, no S3 or S4 and no murmur, click or rub -  ABDOMEN:  soft, nontender, no HSM or masses and bowel sounds normal  MS: extremities normal- no gross deformities noted, no evidence of inflammation in joints, FROM in all extremities. Trace bilateral LOWER EXTREMETIES edema  SKIN: red macular/papular rash bilateral legs anterior shins right>>L and some excoriations. Similar mild rash bilateral extensor foreheads  PSYCH: somewhat poor historian and affect normal/bright    ASSESSMENT / PLAN:  (L30.9) Dermatitis  (primary encounter diagnosis)  Comment: unclear etiology but scratching making worse  Plan: fluocinonide (LIDEX) 0.05 % cream, DERMATOLOGY         REFERRAL        Stronger cream and prn zyrtec. Reveiwed risks and side effects of medication  Follow-up dermatology. Cut nails short and cover with  kurlex - given today. Limit shower/soap. Repeat doxy if needed for cellulitis. Call/email with questions/concerns. Expected course and warning signs reviewed.     (E10.22,  N18.9) Type 1 diabetes mellitus with diabetic chronic kidney disease, unspecified CKD stage (H)  Comment: doing ok  Plan: return to clinic 1-2 months repeat labs. Continue meds. Call/email with questions/concerns.    [place] Finney MD

## 2017-07-10 NOTE — MR AVS SNAPSHOT
After Visit Summary   7/10/2017    Claudia Presley    MRN: [id number]           Patient Information     Date Of Birth          1935        Visit Information        Provider Department      7/10/2017 9:35 AM [place] Finney MD [facility]        Today's Diagnoses     Dermatitis    -  1       Follow-ups after your visit        Additional Services     DERMATOLOGY REFERRAL       Your provider has referred you to: [facility]: [facility] [place] [phone]   [email or web address]  [facility] [phone]   [email or web address]    Please be aware that coverage of these services is subject to the terms and limitations of your health insurance plan.  Call member services at your health plan with any benefit or coverage questions.      Please bring the following with you to your appointment:    (1) Any X-Rays, CTs or MRIs which have been performed.  Contact the facility where they were done to arrange for  prior to your scheduled appointment.    (2) List of current medications  (3) This referral request   (4) Any documents/labs given to you for this referral                  Who to contact     If you have questions or need follow up information about today's clinic visit or your schedule please contact [facility] directly at [phone].  Normal or non-critical lab and imaging results will be communicated to you by MyChart, letter or phone within 4 business days after the clinic has received the results. If you do not hear from us within 7 days, please contact the clinic through MyChart or phone. If you have a critical or abnormal lab result, we will notify you by phone as soon as possible.  Submit refill requests through [employer] or call your pharmacy and they will forward the refill request to us. Please allow 3 business days for your refill to be completed.          Additional  "Information About Your Visit        MyChart Information     [employer] lets you send messages to your doctor, view your test results, renew your prescriptions, schedule appointments and more. To sign up, go to [email or web address] . Click on \"Log in\" on the left side of the screen, which will take you to the Welcome page. Then click on \"Sign up Now\" on the right side of the page.     You will be asked to enter the access code listed below, as well as some personal information. Please follow the directions to create your username and password.     Your access code is: [email or web address]  Expires: 2017 11:14 AM     Your access code will  in 90 days. If you need help or a new code, please call your [place] clinic or [phone].        Care EveryWhere ID     This is your Care EveryWhere ID. This could be used by other organizations to access your [place] medical records  [id number]        Your Vitals Were     Pulse Temperature Pulse Oximetry BMI (Body Mass Index)          60 97.6  F (36.4  C) (Oral) 94% 23.38 kg/m2         Blood Pressure from Last 3 Encounters:   07/10/17 102/67   17 128/72   17 130/80    Weight from Last 3 Encounters:   07/10/17 132 lb (59.9 kg)   17 136 lb (61.7 kg)   17 138 lb (62.6 kg)              We Performed the Following     DERMATOLOGY REFERRAL          Today's Medication Changes          These changes are accurate as of: 7/10/17 10:26 AM.  If you have any questions, ask your nurse or doctor.               Start taking these medicines.        Dose/Directions    fluocinonide 0.05 % cream   Commonly known as:  LIDEX   Used for:  Dermatitis   Started by:  [place] Finney MD        Apply sparingly to affected area twice daily for up to 14 days.  Do not apply to face.   Quantity:  120 g   Refills:  0         Stop taking these medicines if you haven't already. Please contact your care team if you have questions.     doxycycline 100 MG capsule   Commonly known as:  " VIBRAMYCIN   Stopped by:  [place] Finney MD           furosemide 20 MG tablet   Commonly known as:  LASIX   Stopped by:  [place] Finney MD           meclizine 25 MG tablet   Commonly known as:  ANTIVERT   Stopped by:  [place] Finney MD           triamcinolone 0.1 % cream   Commonly known as:  KENALOG   Stopped by:  [place] Finney MD           triamcinolone acetonide 0.025 % Lotn   Stopped by:  [place] Finney MD           TYLENOL CAPS 500 MG OR   Stopped by:  [place] Finney MD                Where to get your medicines      These medications were sent to [id number] - [place], [place] - [address]  [address], [facility] [address]     Phone:  [phone]     fluocinonide 0.05 % cream                Primary Care Provider Office Phone # Fax #    [place] Finney [phone]       [facility] [address] [address] [address]        Equal Access to Services     [facility] : Hadii aad ku hadasho Soomaali, waaxda luqadaha, qaybta kaalmada adeegyada, waxay idiin hayaan jamey [place] . So [facility] [phone].    ATENCIÓN: Si habla español, tiene a herndon disposición servicios gratuitos de asistencia lingüística. Llame al [phone].    We comply with applicable federal civil rights laws and [place] laws. We do not discriminate on the basis of race, color, national origin, age, disability sex, sexual orientation or gender identity.            Thank you!     Thank you for choosing [facility]  for your care. Our goal is always to provide you with excellent care. Hearing back from our patients is one way we can continue to improve our services. Please take a few minutes to complete the written survey that you may receive in the mail after your visit with us. Thank you!             Your Updated Medication List - Protect others around you: Learn how to safely use, store and throw away your medicines at  www.disposemymeds.org.          This list is accurate as of: 7/10/17 10:26 AM.  Always use your most recent med list.                   Brand Name Dispense Instructions for use Diagnosis    amLODIPine 2.5 MG tablet    NORVASC    90 tablet    TAKE TWO TABLETS BY MOUTH DAILY    Essential hypertension with goal blood pressure less than 140/90       aspirin 81 MG tablet      1 TABLET DAILY        blood glucose monitoring test strip    no brand specified    3 Box    In Vitro route 4 times daily. Dispense One Touch Ultra test strips or per patient/insurance preference    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)       budesonide-formoterol 160-4.5 MCG/ACT Inhaler    SYMBICORT    3 Inhaler    Inhale 1 puff into the lungs 2 times daily Daily inhaler for copd Pharmacy ok to hold prescription until due    Chronic bronchitis, unspecified chronic bronchitis type (H)       cetirizine 10 MG tablet    zyrTEC    30 tablet    Take 1 tablet (10 mg) by mouth every evening As needed for itching/rash    Rash       cloNIDine 0.1 MG tablet    CATAPRES    180 tablet    ONE tablet twice  DAILY for blood pressure Pharmacy ok to hold prescription until due    Essential hypertension with goal blood pressure less than 140/90       fluocinonide 0.05 % cream    LIDEX    120 g    Apply sparingly to affected area twice daily for up to 14 days.  Do not apply to face.    Dermatitis       glucagon 1 MG kit     1 mg    Inject 1 mg Subcutaneous once for 1 dose For severe hypoglycemia    Type 1 diabetes mellitus with diabetic chronic kidney disease, unspecified CKD stage (H)       HumaLOG KWIKpen 100 UNIT/ML injection   Generic drug:  insulin lispro     1 mL    11 units before breakfast, 11 units before lunch, 11 units before dinner 3 month supply please    Type 1 diabetes mellitus with diabetic chronic kidney disease, unspecified CKD stage (H)       insulin glargine 100 UNIT/ML injection    LANTUS SOLOSTAR    3 mL    Inject 11  Units Subcutaneous At Bedtime    Type 1 diabetes mellitus with diabetic chronic kidney disease, unspecified CKD stage (H)       * insulin pen needle 29G X 12.7MM     400 each    1 Units 4 times daily (before meals and nightly). Please dispense Rachel Needles    Type 1 diabetes, HbA1c goal < 8% (H)       * insulin pen needle 32G X 4 MM    BD RACHEL U/F    400 each    Use four times daily.    Type 1 diabetes, HbA1c goal < 8% (H)       * BD RACHEL U/F 32G X 4 MM   Generic drug:  insulin pen needle     200 each    USE 4 TIMES DAILY    Type 1 diabetes, HbA1c goal < 8% (H)       ipratropium 17 MCG/ACT Inhaler    ATROVENT HFA    12.9 Inhaler    Inhale 2 puffs into the lungs 4 times daily Pharmacy ok to hold prescription until due    COPD (chronic obstructive pulmonary disease) (H)       levothyroxine 100 MCG tablet    SYNTHROID/LEVOTHROID    90 tablet    Take 1 tablet (100 mcg) by mouth daily For thyroid Pharmacy ok to hold prescription until due    Hypothyroidism, unspecified type       TxtFeedbackCAN FINEPOINT LANCETS Misc     100 each    Use to test blood sugars 4 times daily or as directed.  Brand per patient/insurance preference    Type 1 diabetes, HbA1c goal < 8% (H)       losartan 25 MG tablet    COZAAR    45 tablet    Take 0.5 tablets (12.5 mg) by mouth daily For blood pressure with breakfast    CKD (chronic kidney disease) stage 3, GFR 30-59 ml/min       metoprolol 50 MG 24 hr tablet    TOPROL-XL    90 tablet    Take 1 tablet (50 mg) by mouth daily For blood pressure and lowers pulse. Pharmacy ok to hold prescription until due    Essential hypertension with goal blood pressure less than 140/90       sertraline 25 MG tablet    ZOLOFT    90 tablet    Take 1 tablet (25 mg) by mouth daily for energy/depression and anxiety    Major depressive disorder, recurrent episode, mild (H)       simvastatin 20 MG tablet    ZOCOR    90 tablet    Take 1 tablet (20 mg) by mouth At Bedtime For cholesterol Pharmacy ok to hold prescription until  due    Hyperlipidemia LDL goal <100       * Notice:  This list has 3 medication(s) that are the same as other medications prescribed for you. Read the directions carefully, and ask your doctor or other care provider to review them with you.

## 2017-07-11 RX ORDER — SIMVASTATIN 20 MG
TABLET ORAL
Qty: 90 TABLET | Refills: 0 | Status: SHIPPED | OUTPATIENT
Start: 2017-07-11 | End: 2018-02-12

## 2017-07-23 DIAGNOSIS — E10.9 TYPE 1 DIABETES, HBA1C GOAL < 8% (H): ICD-10-CM

## 2017-07-24 RX ORDER — PEN NEEDLE, DIABETIC 32GX 5/32"
NEEDLE, DISPOSABLE MISCELLANEOUS
Qty: 200 EACH | Refills: 1 | Status: SHIPPED | OUTPATIENT
Start: 2017-07-24 | End: 2017-10-06

## 2017-07-24 NOTE — TELEPHONE ENCOUNTER
Prescription approved per [facility] Refill Protocol.  Gissell Faustin RN

## 2017-08-01 DIAGNOSIS — E10.9 TYPE 1 DIABETES, HBA1C GOAL < 8% (H): ICD-10-CM

## 2017-08-01 RX ORDER — PEN NEEDLE, DIABETIC 32GX 5/32"
NEEDLE, DISPOSABLE MISCELLANEOUS
Qty: 200 EACH | Refills: 0 | OUTPATIENT
Start: 2017-08-01

## 2017-08-02 ENCOUNTER — TELEPHONE (OUTPATIENT)
Dept: FAMILY MEDICINE | Facility: CLINIC | Age: 82
End: 2017-08-02

## 2017-08-02 DIAGNOSIS — E10.22 TYPE 1 DIABETES MELLITUS WITH DIABETIC CHRONIC KIDNEY DISEASE (H): ICD-10-CM

## 2017-08-02 NOTE — TELEPHONE ENCOUNTER
Pharmacy calling patient is at pharmacy waiting is completely out of her Diabetic test strips. Please call to advise.

## 2017-08-08 DIAGNOSIS — E10.22 TYPE 1 DIABETES MELLITUS WITH DIABETIC CHRONIC KIDNEY DISEASE, UNSPECIFIED CKD STAGE (H): ICD-10-CM

## 2017-08-09 RX ORDER — INSULIN LISPRO 100 [IU]/ML
INJECTION, SOLUTION INTRAVENOUS; SUBCUTANEOUS
Qty: 15 ML | Refills: 1 | Status: SHIPPED | OUTPATIENT
Start: 2017-08-09 | End: 2017-10-06

## 2017-08-14 DIAGNOSIS — R21 RASH: ICD-10-CM

## 2017-08-15 RX ORDER — CETIRIZINE HYDROCHLORIDE 10 MG/1
TABLET ORAL
Qty: 30 TABLET | Refills: 3 | Status: SHIPPED | OUTPATIENT
Start: 2017-08-15 | End: 2018-01-08

## 2017-08-28 ENCOUNTER — TELEPHONE (OUTPATIENT)
Dept: FAMILY MEDICINE | Facility: CLINIC | Age: 82
End: 2017-08-28

## 2017-08-28 DIAGNOSIS — I10 ESSENTIAL HYPERTENSION WITH GOAL BLOOD PRESSURE LESS THAN 140/90: ICD-10-CM

## 2017-08-28 DIAGNOSIS — E10.22 TYPE 1 DIABETES MELLITUS WITH DIABETIC CHRONIC KIDNEY DISEASE (H): ICD-10-CM

## 2017-08-28 NOTE — TELEPHONE ENCOUNTER
Patient is requesting a new meter, strips and lancets. The one she has now is acting up.    [facility] pharmacy

## 2017-08-29 RX ORDER — AMLODIPINE BESYLATE 2.5 MG/1
TABLET ORAL
Qty: 180 TABLET | Refills: 0 | Status: SHIPPED | OUTPATIENT
Start: 2017-08-29 | End: 2017-10-23

## 2017-10-05 ENCOUNTER — TRANSFERRED RECORDS (OUTPATIENT)
Dept: HEALTH INFORMATION MANAGEMENT | Facility: CLINIC | Age: 82
End: 2017-10-05

## 2017-10-17 DIAGNOSIS — E10.22 TYPE 1 DIABETES MELLITUS WITH DIABETIC CHRONIC KIDNEY DISEASE, UNSPECIFIED CKD STAGE (H): ICD-10-CM

## 2017-10-18 ENCOUNTER — TELEPHONE (OUTPATIENT)
Dept: FAMILY MEDICINE | Facility: CLINIC | Age: 82
End: 2017-10-18

## 2017-10-18 DIAGNOSIS — E10.22 TYPE 1 DIABETES MELLITUS WITH DIABETIC CHRONIC KIDNEY DISEASE, UNSPECIFIED CKD STAGE (H): ICD-10-CM

## 2017-10-18 RX ORDER — INSULIN GLARGINE 100 [IU]/ML
INJECTION, SOLUTION SUBCUTANEOUS
OUTPATIENT
Start: 2017-10-18

## 2017-10-18 NOTE — TELEPHONE ENCOUNTER
Patient needing refills of Humalog and Lantus. Humulog sen 10/6. Lantus refill sent. Diabetes follow up appointment scheduled 10/23.Barb LAUGHLINN, RN, CPN

## 2017-10-19 RX ORDER — INSULIN LISPRO 100 [IU]/ML
INJECTION, SOLUTION INTRAVENOUS; SUBCUTANEOUS
Qty: 15 ML | Refills: 0 | Status: SHIPPED | OUTPATIENT
Start: 2017-10-19 | End: 2018-01-20

## 2017-10-23 ENCOUNTER — OFFICE VISIT (OUTPATIENT)
Dept: FAMILY MEDICINE | Facility: CLINIC | Age: 82
End: 2017-10-23
Payer: COMMERCIAL

## 2017-10-23 VITALS
OXYGEN SATURATION: 91 % | HEART RATE: 75 BPM | DIASTOLIC BLOOD PRESSURE: 69 MMHG | SYSTOLIC BLOOD PRESSURE: 135 MMHG | WEIGHT: 135 LBS | BODY MASS INDEX: 23.91 KG/M2 | TEMPERATURE: 97.3 F

## 2017-10-23 DIAGNOSIS — E03.9 HYPOTHYROIDISM, UNSPECIFIED TYPE: ICD-10-CM

## 2017-10-23 DIAGNOSIS — J42 CHRONIC BRONCHITIS, UNSPECIFIED CHRONIC BRONCHITIS TYPE (H): ICD-10-CM

## 2017-10-23 DIAGNOSIS — E10.22 TYPE 1 DIABETES MELLITUS WITH DIABETIC CHRONIC KIDNEY DISEASE, UNSPECIFIED CKD STAGE (H): Primary | ICD-10-CM

## 2017-10-23 DIAGNOSIS — F33.0 MAJOR DEPRESSIVE DISORDER, RECURRENT EPISODE, MILD (H): ICD-10-CM

## 2017-10-23 DIAGNOSIS — L30.9 DERMATITIS: ICD-10-CM

## 2017-10-23 DIAGNOSIS — E10.9 TYPE 1 DIABETES, HBA1C GOAL < 8% (H): ICD-10-CM

## 2017-10-23 DIAGNOSIS — I10 ESSENTIAL HYPERTENSION WITH GOAL BLOOD PRESSURE LESS THAN 140/90: ICD-10-CM

## 2017-10-23 DIAGNOSIS — N18.30 CKD (CHRONIC KIDNEY DISEASE) STAGE 3, GFR 30-59 ML/MIN (H): ICD-10-CM

## 2017-10-23 LAB
ALBUMIN SERPL-MCNC: 3.8 G/DL (ref 3.4–5)
ANION GAP SERPL CALCULATED.3IONS-SCNC: 4 MMOL/L (ref 3–14)
BUN SERPL-MCNC: 16 MG/DL (ref 7–30)
CALCIUM SERPL-MCNC: 9.8 MG/DL (ref 8.5–10.1)
CHLORIDE SERPL-SCNC: 97 MMOL/L (ref 94–109)
CO2 SERPL-SCNC: 34 MMOL/L (ref 20–32)
CREAT SERPL-MCNC: 1.38 MG/DL (ref 0.52–1.04)
GFR SERPL CREATININE-BSD FRML MDRD: 37 ML/MIN/1.7M2
GLUCOSE SERPL-MCNC: 132 MG/DL (ref 70–99)
HBA1C MFR BLD: 7.2 % (ref 4.3–6)
PHOSPHATE SERPL-MCNC: 3.5 MG/DL (ref 2.5–4.5)
POTASSIUM SERPL-SCNC: 4.3 MMOL/L (ref 3.4–5.3)
SODIUM SERPL-SCNC: 135 MMOL/L (ref 133–144)
T4 FREE SERPL-MCNC: 0.8 NG/DL (ref 0.76–1.46)
TSH SERPL DL<=0.005 MIU/L-ACNC: 39.01 MU/L (ref 0.4–4)

## 2017-10-23 PROCEDURE — 36415 COLL VENOUS BLD VENIPUNCTURE: CPT | Performed by: FAMILY MEDICINE

## 2017-10-23 PROCEDURE — 84443 ASSAY THYROID STIM HORMONE: CPT | Performed by: FAMILY MEDICINE

## 2017-10-23 PROCEDURE — 99214 OFFICE O/P EST MOD 30 MIN: CPT | Performed by: FAMILY MEDICINE

## 2017-10-23 PROCEDURE — 83036 HEMOGLOBIN GLYCOSYLATED A1C: CPT | Performed by: FAMILY MEDICINE

## 2017-10-23 PROCEDURE — 84439 ASSAY OF FREE THYROXINE: CPT | Performed by: FAMILY MEDICINE

## 2017-10-23 PROCEDURE — 80069 RENAL FUNCTION PANEL: CPT | Performed by: FAMILY MEDICINE

## 2017-10-23 RX ORDER — LOSARTAN POTASSIUM 25 MG/1
12.5 TABLET ORAL DAILY
Qty: 45 TABLET | Refills: 1 | Status: SHIPPED | OUTPATIENT
Start: 2017-10-23 | End: 2018-04-26

## 2017-10-23 RX ORDER — SERTRALINE HYDROCHLORIDE 25 MG/1
25 TABLET, FILM COATED ORAL DAILY
Qty: 90 TABLET | Refills: 1 | Status: SHIPPED | OUTPATIENT
Start: 2017-10-23 | End: 2018-02-16

## 2017-10-23 RX ORDER — TRIAMCINOLONE ACETONIDE 1 MG/G
CREAM TOPICAL
Qty: 80 G | Refills: 1 | Status: SHIPPED | OUTPATIENT
Start: 2017-10-23 | End: 2018-02-16

## 2017-10-23 RX ORDER — AMLODIPINE BESYLATE 2.5 MG/1
TABLET ORAL
Qty: 180 TABLET | Refills: 1 | Status: SHIPPED | OUTPATIENT
Start: 2017-10-23 | End: 2018-04-26

## 2017-10-23 RX ORDER — BUDESONIDE AND FORMOTEROL FUMARATE DIHYDRATE 160; 4.5 UG/1; UG/1
1 AEROSOL RESPIRATORY (INHALATION) 2 TIMES DAILY
Qty: 3 INHALER | Refills: 1 | Status: SHIPPED | OUTPATIENT
Start: 2017-10-23 | End: 2018-01-01

## 2017-10-23 RX ORDER — LEVOTHYROXINE SODIUM 100 UG/1
100 TABLET ORAL DAILY
Qty: 90 TABLET | Refills: 1 | Status: SHIPPED | OUTPATIENT
Start: 2017-10-23 | End: 2018-02-16

## 2017-10-23 RX ORDER — METOPROLOL SUCCINATE 50 MG/1
50 TABLET, EXTENDED RELEASE ORAL DAILY
Qty: 90 TABLET | Refills: 1 | Status: SHIPPED | OUTPATIENT
Start: 2017-10-23 | End: 2018-04-26

## 2017-10-23 ASSESSMENT — PATIENT HEALTH QUESTIONNAIRE - PHQ9: SUM OF ALL RESPONSES TO PHQ QUESTIONS 1-9: 0

## 2017-10-23 NOTE — PROGRESS NOTES
SUBJECTIVE:  Claudia Presley, a 82 year old female scheduled an appointment to discuss the following issues:     Type 1 diabetes mellitus with diabetic chronic kidney disease, unspecified CKD stage (H)  Hypothyroidism, unspecified type  Essential hypertension with goal blood pressure less than 140/90  Follow-up dm, htn, asthma, high cholesterol and memory difficulties.  Still occasionally labile blood sugars.  helps in patient's care with sugars/monitor. Breathing ok. No chest pain. Patient eat late breakfast. No feet changes or ulcers. No hematuria or dysuria. Emotionally ok.   Sleep ok. Bedtime 10:30pm.  Scratching skin. 's doxepin NOT helpful. Seen dermatology in past for history of skin cancer. Benadryl/vistaril not tried in past.     Past Medical History:   Diagnosis Date     COPD (chronic obstructive pulmonary disease) (H)      Depression      Diverticulosis of colon 2010    seen on colonoscopy     Hypertension      Hypothyroidism      Osteopenia      Osteoporosis      Peripheral edema      Spinal stenosis      Type I (juvenile type) diabetes mellitus without mention of complication, not stated as uncontrolled        Past Surgical History:   Procedure Laterality Date     BREAST LUMPECTOMY, RT/LT      Breast Lumpectomy RT/LT     CATARACT IOL, RT/LT       PHACOEMULSIFICATION WITH STANDARD INTRAOCULAR LENS IMPLANT  3/2007; 3/2007    left eye; right eye (STS)     TONSILLECTOMY         Family History   Problem Relation Age of Onset     Cancer - colorectal Father      PASSED AWAY FROM COLON CA     Alzheimer Disease Brother      PICKS DISEASE     Alzheimer Disease Sister      ON ARICEPT     Depression Sister      POST PARTUM     Respiratory Brother      COPD     Arthritis Sister      OSTEOPOROSIS Sister      Thyroid Disease Sister      Alzheimer Disease Mother      Breast Cancer Maternal Aunt        Social History   Substance Use Topics     Smoking status: Former Smoker     Types: Cigarettes     Quit  date: 1/1/1998     Smokeless tobacco: Never Used      Comment: 1998     Alcohol use No       ROS:  All other ROS negative  OBJECTIVE:  /69  Pulse 75  Temp 97.3  F (36.3  C) (Oral)  Wt 135 lb (61.2 kg)  SpO2 91%  BMI 23.91 kg/m2  EXAM:  GENERAL APPEARANCE: healthy, alert and no distress  EYES: EOMI,  PERRL  NECK: no adenopathy, no asymmetry, masses, or scars and thyroid normal to palpation  RESP: lungs clear to auscultation - no rales, rhonchi or wheezes  CV: regular rates and rhythm, normal S1 S2, no S3 or S4 and no murmur, click or rub -  ABDOMEN:  soft, nontender, no HSM or masses and bowel sounds normal  MS: extremities normal- no gross deformities noted, no evidence of inflammation in joints, FROM in all extremities.  SKIN: dry/excoriated skin bilateral shins  NEURO: Normal strength and tone, sensory exam grossly normal, mentation intact and speech normal  NEURO: poor historian. Here with   PSYCH: mentation appears normal and affect normal/bright    ASSESSMENT / PLAN:  (E10.22,  N18.9) Type 1 diabetes mellitus with diabetic chronic kidney disease, unspecified CKD stage (H)  (primary encounter diagnosis)  Comment: still a little over treatment at time. Difficult patient with memory concerns but no LOSS OF CONSCIENCE   Plan: Renal panel (Alb, BUN, Ca, Cl, CO2, Creat,         Gluc, Phos, K, Na), Hemoglobin A1c, insulin         glargine (LANTUS SOLOSTAR) 100 UNIT/ML         injection, glucagon (GLUCAGON EMERGENCY) 1 MG         kit        Will lower lantus 2 untis. Prescription glucagon pen and Recheck in 3 months  Back to dm ed if needed - patient/ not interested at this point. Continue closely monitor and diet.     (E03.9) Hypothyroidism, unspecified type  Plan: TSH with free T4 reflex, levothyroxine         (SYNTHROID/LEVOTHROID) 100 MCG tablet        ?taking meds. Await labs    (I10) Essential hypertension with goal blood pressure less than 140/90  Comment: stable  Plan: Renal panel (Alb,  BUN, Ca, Cl, CO2, Creat,         Gluc, Phos, K, Na), amLODIPine (NORVASC) 2.5 MG        tablet, metoprolol (TOPROL-XL) 50 MG 24 hr         tablet        Chest pain to er. Cr pending    (L30.9) Dermatitis  Comment: patient scratching skin  Plan: triamcinolone (KENALOG) 0.1 % cream        Cut nails short and take over the counter benadryl at bedtime. Back to derm if needed. Limit shower time/soap/hot water    (F33.0) Major depressive disorder, recurrent episode, mild (H)  Comment: stable  Plan: sertraline (ZOLOFT) 25 MG tablet        If SUICIAL IDEATION OR HOMOCIDAL IDEATION OR HERMELINDA TO ER     (N18.3) CKD (chronic kidney disease) stage 3, GFR 30-59 ml/min  Plan: losartan (COZAAR) 25 MG tablet            (J42) Chronic bronchitis, unspecified chronic bronchitis type (H)  Comment: stable  Plan: budesonide-formoterol (SYMBICORT) 160-4.5         MCG/ACT Inhaler            [place] Finney

## 2017-10-23 NOTE — MR AVS SNAPSHOT
"              After Visit Summary   10/23/2017    Claudia Presley    MRN: [id number]           Patient Information     Date Of Birth          1935        Visit Information        Provider Department      10/23/2017 2:10 PM [place] Finney MD [facility]        Today's Diagnoses     Type 1 diabetes mellitus with diabetic chronic kidney disease, unspecified CKD stage (H)    -  1    Hypothyroidism, unspecified type        Essential hypertension with goal blood pressure less than 140/90        Dermatitis           Follow-ups after your visit        Who to contact     If you have questions or need follow up information about today's clinic visit or your schedule please contact [facility] directly at [phone].  Normal or non-critical lab and imaging results will be communicated to you by MyChart, letter or phone within 4 business days after the clinic has received the results. If you do not hear from us within 7 days, please contact the clinic through MyChart or phone. If you have a critical or abnormal lab result, we will notify you by phone as soon as possible.  Submit refill requests through [employer] or call your pharmacy and they will forward the refill request to us. Please allow 3 business days for your refill to be completed.          Additional Information About Your Visit        MyChart Information     [employer] lets you send messages to your doctor, view your test results, renew your prescriptions, schedule appointments and more. To sign up, go to [email or web address] . Click on \"Log in\" on the left side of the screen, which will take you to the Welcome page. Then click on \"Sign up Now\" on the right side of the page.     You will be asked to enter the access code listed below, as well as some personal information. Please follow the directions to create your username and password.     Your access code is: [email or web address]  Expires: 1/21/2018  2:49 PM     Your access code will "  in 90 days. If you need help or a new code, please call your [facility] clinic or [phone].        Care EveryWhere ID     This is your Care EveryWhere ID. This could be used by other organizations to access your [facility] medical records  [id number]        Your Vitals Were     Pulse Temperature Pulse Oximetry BMI (Body Mass Index)          75 97.3  F (36.3  C) (Oral) 91% 23.91 kg/m2         Blood Pressure from Last 3 Encounters:   10/23/17 135/69   07/10/17 102/67   17 128/72    Weight from Last 3 Encounters:   10/23/17 135 lb (61.2 kg)   07/10/17 132 lb (59.9 kg)   17 136 lb (61.7 kg)              We Performed the Following     Hemoglobin A1c     Renal panel (Alb, BUN, Ca, Cl, CO2, Creat, Gluc, Phos, K, Na)     TSH with free T4 reflex          Today's Medication Changes          These changes are accurate as of: 10/23/17  2:49 PM.  If you have any questions, ask your nurse or doctor.               Start taking these medicines.        Dose/Directions    triamcinolone 0.1 % cream   Commonly known as:  KENALOG   Used for:  Dermatitis   Started by:  [place] Finney MD        Apply sparingly to affected area two times daily for up 10 days.   Quantity:  80 g   Refills:  1         These medicines have changed or have updated prescriptions.        Dose/Directions    * glucagon 1 MG kit   This may have changed:  Another medication with the same name was added. Make sure you understand how and when to take each.   Used for:  Type 1 diabetes mellitus with diabetic chronic kidney disease, unspecified CKD stage (H)   Changed by:  [place] Finney MD        Dose:  1 mg   Inject 1 mg Subcutaneous once for 1 dose For severe hypoglycemia   Quantity:  1 mg   Refills:  1       * glucagon 1 MG kit   Commonly known as:  GLUCAGON EMERGENCY   This may have changed:  You were already taking a medication with the same name, and this prescription was added. Make sure you understand how and when to take each.    Used for:  Type 1 diabetes mellitus with diabetic chronic kidney disease, unspecified CKD stage (H)   Changed by:  [place] Finney MD        Dose:  1 mg   Inject 1 mg into the muscle once for 1 dose   Quantity:  1 mg   Refills:  1       insulin glargine 100 UNIT/ML injection   Commonly known as:  LANTUS SOLOSTAR   This may have changed:  how much to take   Used for:  Type 1 diabetes mellitus with diabetic chronic kidney disease, unspecified CKD stage (H)   Changed by:  [place] Finney MD        Dose:  9 Units   Inject 9 Units Subcutaneous At Bedtime   Quantity:  1 mL   Refills:  0       * Notice:  This list has 2 medication(s) that are the same as other medications prescribed for you. Read the directions carefully, and ask your doctor or other care provider to review them with you.         Where to get your medicines      These medications were sent to [id number] - [place], [place] - [address]  [address] [place] [address]     Phone:  [phone]     glucagon 1 MG kit    triamcinolone 0.1 % cream         Some of these will need a paper prescription and others can be bought over the counter.  Ask your nurse if you have questions.     Bring a paper prescription for each of these medications     insulin glargine 100 UNIT/ML injection                Primary Care Provider Office Phone # Fax #    [place] Finney [phone] [address] [address] [address]        Equal Access to Services     KIRAN SHERIFF AH: Hadii matias lyonso Sotessy, waaxda luqadaha, qaybta kaalmada adeantony, micky washington . So [facility] [phone].    ATENCIÓN: Si habla español, tiene a herndon disposición servicios gratuitos de asistencia lingüística. Llame al [phone].    We comply with applicable federal civil rights laws and [place] laws. We do not discriminate on the basis of race, color, national origin, age, disability, sex, sexual orientation, or  gender identity.            Thank you!     [facility] you for [facility]  [facility]. Our goal is always to provide you with excellent care. Hearing back from our patients is one way we can continue to improve our services. Please take a few minutes to complete the written survey that you may receive in the mail after your visit with us. Thank you!             Your Updated Medication List - Protect others around you: Learn how to safely use, store and throw away your medicines at www.disposemymeds.org.          This list is accurate as of: 10/23/17  2:49 PM.  Always use your most recent med list.                   Brand Name Dispense Instructions for use Diagnosis    amLODIPine 2.5 MG tablet    NORVASC    180 tablet    TAKE 2 TABLETS BY MOUTH DAILY    Essential hypertension with goal blood pressure less than 140/90       aspirin 81 MG tablet      1 TABLET DAILY        blood glucose lancets standard    no brand specified    100 each    Use to test blood sugar 4 times daily or as directed. 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 Dispense One Touch Ultra or per patient's insurance    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)       blood glucose monitoring test strip    no brand specified    3 Box    In Vitro route 4 times daily. Dispense One Touch Ultra test strips or per patient/insurance preference    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)       budesonide-formoterol 160-4.5 MCG/ACT Inhaler    SYMBICORT    3 Inhaler    Inhale 1 puff into the lungs 2 times daily Daily inhaler for copd Pharmacy ok to hold prescription until due    Chronic bronchitis, unspecified chronic bronchitis type (H)       cetirizine 10 MG tablet    zyrTEC    30  tablet    TAKE ONE TABLET BY MOUTH IN THE EVENING AS NEEDED FOR ITCHING / RASH    Rash       cloNIDine 0.1 MG tablet    CATAPRES    180 tablet    TAKE 1 TABLET BY MOUTH 2 TIMES DAILY FOR BLOOD PRESSURE    Essential hypertension with goal blood pressure less than 140/90       doxycycline 100 MG capsule    VIBRAMYCIN    20 capsule    TAKE 1 CAPSULE BY MOUTH TWICE DAILY WITH FOOD    Cellulitis of leg, right       fluocinonide 0.05 % cream    LIDEX    120 g    Apply sparingly to affected area twice daily for up to 14 days.  Do not apply to face.    Dermatitis       * glucagon 1 MG kit     1 mg    Inject 1 mg Subcutaneous once for 1 dose For severe hypoglycemia    Type 1 diabetes mellitus with diabetic chronic kidney disease, unspecified CKD stage (H)       * glucagon 1 MG kit    GLUCAGON EMERGENCY    1 mg    Inject 1 mg into the muscle once for 1 dose    Type 1 diabetes mellitus with diabetic chronic kidney disease, unspecified CKD stage (H)       * HumaLOG KWIKpen 100 UNIT/ML injection   Generic drug:  insulin lispro     1 mL    11 units before breakfast, 11 units before lunch, 11 units before dinner 3 month supply please    Type 1 diabetes mellitus with diabetic chronic kidney disease, unspecified CKD stage (H)       * HumaLOG KWIKpen 100 UNIT/ML injection   Generic drug:  insulin lispro     15 mL    INJECT 13 UNITS UNDER THE SKIN BEFORE BREAKFAST, LUNCH, AND DINNER    Type 1 diabetes mellitus with diabetic chronic kidney disease, unspecified CKD stage (H)       * HumaLOG KWIKpen 100 UNIT/ML injection   Generic drug:  insulin lispro     15 mL    INJECT 13 UNITS UNDER THE SKIN BEFORE BREAKFAST, LUNCH, AND DINNER    Type 1 diabetes mellitus with diabetic chronic kidney disease, unspecified CKD stage (H)       insulin glargine 100 UNIT/ML injection    LANTUS SOLOSTAR    1 mL    Inject 9 Units Subcutaneous At Bedtime    Type 1 diabetes mellitus with diabetic chronic kidney disease, unspecified CKD stage (H)       *  insulin pen needle 29G X 12.7MM     400 each    1 Units 4 times daily (before meals and nightly). Please dispense Rachel Needles    Type 1 diabetes, HbA1c goal < 8% (H)       * insulin pen needle 32G X 4 MM    BD RACHEL U/F    400 each    Use four times daily.    Type 1 diabetes, HbA1c goal < 8% (H)       * BD RACHEL U/F 32G X 4 MM   Generic drug:  insulin pen needle     200 each    use 4 times daily    Type 1 diabetes mellitus with diabetic chronic kidney disease, unspecified CKD stage (H)       * BD RACHEL U/F 32G X 4 MM   Generic drug:  insulin pen needle     200 each    use 4 times daily    Type 1 diabetes mellitus with diabetic chronic kidney disease, unspecified CKD stage (H)       ipratropium 17 MCG/ACT Inhaler    ATROVENT HFA    12.9 Inhaler    Inhale 2 puffs into the lungs 4 times daily Pharmacy ok to hold prescription until due    COPD (chronic obstructive pulmonary disease) (H)       levothyroxine 100 MCG tablet    SYNTHROID/LEVOTHROID    90 tablet    Take 1 tablet (100 mcg) by mouth daily For thyroid Pharmacy ok to hold prescription until due    Hypothyroidism, unspecified type       LIFESCAN FINEPOINT LANCETS Misc     100 each    Use to test blood sugars 4 times daily or as directed.  Brand per patient/insurance preference    Type 1 diabetes, HbA1c goal < 8% (H)       losartan 25 MG tablet    COZAAR    45 tablet    Take 0.5 tablets (12.5 mg) by mouth daily For blood pressure with breakfast    CKD (chronic kidney disease) stage 3, GFR 30-59 ml/min       metoprolol 50 MG 24 hr tablet    TOPROL-XL    90 tablet    Take 1 tablet (50 mg) by mouth daily For blood pressure and lowers pulse. Pharmacy ok to hold prescription until due    Essential hypertension with goal blood pressure less than 140/90       sertraline 25 MG tablet    ZOLOFT    90 tablet    Take 1 tablet (25 mg) by mouth daily for energy/depression and anxiety    Major depressive disorder, recurrent episode, mild (H)       simvastatin 20 MG tablet     ZOCOR    90 tablet    TAKE ONE TABLET BY MOUTH AT BEDTIME for cholesterol    Hyperlipidemia LDL goal <100       triamcinolone 0.1 % cream    KENALOG    80 g    Apply sparingly to affected area two times daily for up 10 days.    Dermatitis       * Notice:  This list has 9 medication(s) that are the same as other medications prescribed for you. Read the directions carefully, and ask your doctor or other care provider to review them with you.

## 2017-10-23 NOTE — LETTER
October 24, 2017    Claudia Presley  [address] [place] [address]          Dear Claudia,    Generally normal results except thyroid tests slightly worse. Make sure you take you synthroid everyday. Kidney tests improving. Recheck in 4 months.    If you have any questions or concerns, please call myself or my nurse at [phone].    Sincerely,    .[place] Finney MD/corazon      Results for orders placed or performed in visit on 10/23/17   Renal panel (Alb, BUN, Ca, Cl, CO2, Creat, Gluc, Phos, K, Na)   Result Value Ref Range    Sodium 135 133 - 144 mmol/L    Potassium 4.3 3.4 - 5.3 mmol/L    Chloride 97 94 - 109 mmol/L    Carbon Dioxide 34 (H) 20 - 32 mmol/L    Anion Gap 4 3 - 14 mmol/L    Glucose 132 (H) 70 - 99 mg/dL    Urea Nitrogen 16 7 - 30 mg/dL    Creatinine 1.38 (H) 0.52 - 1.04 mg/dL    GFR Estimate 37 (L) >60 mL/min/1.7m2    GFR Estimate If Black 44 (L) >60 mL/min/1.7m2    Calcium 9.8 8.5 - 10.1 mg/dL    Phosphorus 3.5 2.5 - 4.5 mg/dL    Albumin 3.8 3.4 - 5.0 g/dL   TSH with free T4 reflex   Result Value Ref Range    TSH 39.01 (H) 0.40 - 4.00 mU/L   Hemoglobin A1c   Result Value Ref Range    Hemoglobin A1C 7.2 (H) 4.3 - 6.0 %   T4 free   Result Value Ref Range    T4 Free 0.80 0.76 - 1.46 ng/dL

## 2017-10-24 RX ORDER — PEN NEEDLE, DIABETIC 32GX 5/32"
NEEDLE, DISPOSABLE MISCELLANEOUS
Qty: 200 EACH | Refills: 1 | Status: SHIPPED | OUTPATIENT
Start: 2017-10-24 | End: 2018-02-03

## 2017-11-11 DIAGNOSIS — E10.22 TYPE 1 DIABETES MELLITUS WITH DIABETIC CHRONIC KIDNEY DISEASE, UNSPECIFIED CKD STAGE (H): ICD-10-CM

## 2017-11-13 RX ORDER — INSULIN GLARGINE 100 [IU]/ML
INJECTION, SOLUTION SUBCUTANEOUS
Qty: 3 ML | Refills: 0 | Status: SHIPPED | OUTPATIENT
Start: 2017-11-13 | End: 2017-12-09

## 2017-12-08 ENCOUNTER — TRANSFERRED RECORDS (OUTPATIENT)
Dept: HEALTH INFORMATION MANAGEMENT | Facility: CLINIC | Age: 82
End: 2017-12-08

## 2017-12-08 LAB — EJECTION FRACTION: 70

## 2017-12-09 DIAGNOSIS — E10.22 TYPE 1 DIABETES MELLITUS WITH DIABETIC CHRONIC KIDNEY DISEASE, UNSPECIFIED CKD STAGE (H): ICD-10-CM

## 2017-12-11 RX ORDER — INSULIN GLARGINE 100 [IU]/ML
INJECTION, SOLUTION SUBCUTANEOUS
Qty: 6 ML | Refills: 0 | Status: SHIPPED | OUTPATIENT
Start: 2017-12-11 | End: 2018-02-16

## 2018-01-01 ENCOUNTER — TRANSFERRED RECORDS (OUTPATIENT)
Dept: HEALTH INFORMATION MANAGEMENT | Facility: CLINIC | Age: 83
End: 2018-01-01

## 2018-01-01 ENCOUNTER — PATIENT OUTREACH (OUTPATIENT)
Dept: CARE COORDINATION | Facility: CLINIC | Age: 83
End: 2018-01-01

## 2018-01-01 ENCOUNTER — TELEPHONE (OUTPATIENT)
Dept: FAMILY MEDICINE | Facility: CLINIC | Age: 83
End: 2018-01-01

## 2018-01-01 ENCOUNTER — OFFICE VISIT (OUTPATIENT)
Dept: FAMILY MEDICINE | Facility: CLINIC | Age: 83
End: 2018-01-01
Payer: COMMERCIAL

## 2018-01-01 VITALS
SYSTOLIC BLOOD PRESSURE: 105 MMHG | HEART RATE: 96 BPM | RESPIRATION RATE: 20 BRPM | TEMPERATURE: 97 F | DIASTOLIC BLOOD PRESSURE: 77 MMHG | BODY MASS INDEX: 22.32 KG/M2 | WEIGHT: 126 LBS | OXYGEN SATURATION: 90 %

## 2018-01-01 VITALS
BODY MASS INDEX: 22.14 KG/M2 | WEIGHT: 125 LBS | TEMPERATURE: 96.4 F | OXYGEN SATURATION: 89 % | SYSTOLIC BLOOD PRESSURE: 112 MMHG | HEART RATE: 91 BPM | DIASTOLIC BLOOD PRESSURE: 66 MMHG

## 2018-01-01 DIAGNOSIS — S32.010S CLOSED COMPRESSION FRACTURE OF FIRST LUMBAR VERTEBRA, SEQUELA: ICD-10-CM

## 2018-01-01 DIAGNOSIS — E10.9 TYPE 1 DIABETES, HBA1C GOAL < 8% (H): ICD-10-CM

## 2018-01-01 DIAGNOSIS — I10 ESSENTIAL HYPERTENSION WITH GOAL BLOOD PRESSURE LESS THAN 140/90: ICD-10-CM

## 2018-01-01 DIAGNOSIS — M25.559 ARTHRALGIA OF HIP, UNSPECIFIED LATERALITY: ICD-10-CM

## 2018-01-01 DIAGNOSIS — M25.559 ARTHRALGIA OF HIP, UNSPECIFIED LATERALITY: Primary | ICD-10-CM

## 2018-01-01 DIAGNOSIS — N18.30 CKD (CHRONIC KIDNEY DISEASE) STAGE 3, GFR 30-59 ML/MIN (H): ICD-10-CM

## 2018-01-01 DIAGNOSIS — E10.22 TYPE 1 DIABETES MELLITUS WITH DIABETIC CHRONIC KIDNEY DISEASE, UNSPECIFIED CKD STAGE (H): Primary | ICD-10-CM

## 2018-01-01 DIAGNOSIS — J42 CHRONIC BRONCHITIS, UNSPECIFIED CHRONIC BRONCHITIS TYPE (H): ICD-10-CM

## 2018-01-01 DIAGNOSIS — E78.5 HYPERLIPIDEMIA LDL GOAL <100: ICD-10-CM

## 2018-01-01 DIAGNOSIS — F03.90 DEMENTIA WITHOUT BEHAVIORAL DISTURBANCE, UNSPECIFIED DEMENTIA TYPE: ICD-10-CM

## 2018-01-01 DIAGNOSIS — I48.91 ATRIAL FIBRILLATION, UNSPECIFIED TYPE (H): ICD-10-CM

## 2018-01-01 DIAGNOSIS — E10.22 TYPE 1 DIABETES MELLITUS WITH DIABETIC CHRONIC KIDNEY DISEASE, UNSPECIFIED CKD STAGE (H): ICD-10-CM

## 2018-01-01 DIAGNOSIS — F33.1 MAJOR DEPRESSIVE DISORDER, RECURRENT EPISODE, MODERATE (H): ICD-10-CM

## 2018-01-01 DIAGNOSIS — R41.3 MEMORY LOSS: Primary | ICD-10-CM

## 2018-01-01 DIAGNOSIS — E03.9 HYPOTHYROIDISM, UNSPECIFIED TYPE: ICD-10-CM

## 2018-01-01 DIAGNOSIS — R73.09 LABILE BLOOD GLUCOSE: Primary | ICD-10-CM

## 2018-01-01 LAB
CREAT SERPL-MCNC: 1.12 MG/DL (ref 0.57–1.11)
CREAT SERPL-MCNC: 1.24 MG/DL (ref 0.57–1.11)
GFR SERPL CREATININE-BSD FRML MDRD: 41 ML/MIN/1.73M2
GFR SERPL CREATININE-BSD FRML MDRD: 46 ML/MIN/1.73M2
GLUCOSE SERPL-MCNC: 163 MG/DL (ref 65–100)
GLUCOSE SERPL-MCNC: 47 MG/DL (ref 65–100)
HBA1C MFR BLD: 7.3 % (ref 0–?)
HBA1C MFR BLD: 7.5 % (ref 0–?)
POTASSIUM SERPL-SCNC: 3.8 MMOL/L (ref 3.5–5)
POTASSIUM SERPL-SCNC: 4.2 MMOL/L (ref 3.5–5)

## 2018-01-01 PROCEDURE — 99214 OFFICE O/P EST MOD 30 MIN: CPT | Performed by: FAMILY MEDICINE

## 2018-01-01 PROCEDURE — 99495 TRANSJ CARE MGMT MOD F2F 14D: CPT | Performed by: FAMILY MEDICINE

## 2018-01-01 RX ORDER — AMLODIPINE BESYLATE 2.5 MG/1
5 TABLET ORAL DAILY
Qty: 180 TABLET | Refills: 0 | Status: SHIPPED | OUTPATIENT
Start: 2018-01-01 | End: 2019-01-01

## 2018-01-01 RX ORDER — LANCING DEVICE
EACH MISCELLANEOUS
Qty: 1 EACH | Refills: 0 | Status: SHIPPED | OUTPATIENT
Start: 2018-01-01

## 2018-01-01 RX ORDER — SIMVASTATIN 20 MG
TABLET ORAL
Qty: 90 TABLET | Refills: 2 | Status: SHIPPED | OUTPATIENT
Start: 2018-01-01 | End: 2019-01-01

## 2018-01-01 RX ORDER — LOSARTAN POTASSIUM 25 MG/1
12.5 TABLET ORAL DAILY
Qty: 45 TABLET | Refills: 1 | Status: SHIPPED | OUTPATIENT
Start: 2018-01-01 | End: 2019-01-01

## 2018-01-01 RX ORDER — PEN NEEDLE, DIABETIC 32GX 5/32"
NEEDLE, DISPOSABLE MISCELLANEOUS
Qty: 200 EACH | Refills: 2 | Status: SHIPPED | OUTPATIENT
Start: 2018-01-01 | End: 2019-01-01

## 2018-01-01 RX ORDER — AMLODIPINE BESYLATE 2.5 MG/1
TABLET ORAL
OUTPATIENT
Start: 2018-01-01

## 2018-01-01 RX ORDER — METOPROLOL SUCCINATE 50 MG/1
50 TABLET, EXTENDED RELEASE ORAL DAILY
Qty: 90 TABLET | Refills: 1 | Status: SHIPPED | OUTPATIENT
Start: 2018-01-01 | End: 2019-01-01

## 2018-01-01 RX ORDER — BUDESONIDE AND FORMOTEROL FUMARATE DIHYDRATE 160; 4.5 UG/1; UG/1
1 AEROSOL RESPIRATORY (INHALATION) 2 TIMES DAILY
Qty: 3 INHALER | Refills: 1 | Status: SHIPPED | OUTPATIENT
Start: 2018-01-01 | End: 2019-01-01

## 2018-01-01 RX ORDER — LEVOTHYROXINE SODIUM 137 UG/1
137 TABLET ORAL DAILY
Qty: 90 TABLET | Refills: 1 | Status: SHIPPED | OUTPATIENT
Start: 2018-01-01 | End: 2019-01-01

## 2018-01-01 ASSESSMENT — ANXIETY QUESTIONNAIRES
GAD7 TOTAL SCORE: 8
GAD7 TOTAL SCORE: 8
1. FEELING NERVOUS, ANXIOUS, OR ON EDGE: SEVERAL DAYS
6. BECOMING EASILY ANNOYED OR IRRITABLE: SEVERAL DAYS
2. NOT BEING ABLE TO STOP OR CONTROL WORRYING: SEVERAL DAYS
7. FEELING AFRAID AS IF SOMETHING AWFUL MIGHT HAPPEN: SEVERAL DAYS
IF YOU CHECKED OFF ANY PROBLEMS ON THIS QUESTIONNAIRE, HOW DIFFICULT HAVE THESE PROBLEMS MADE IT FOR YOU TO DO YOUR WORK, TAKE CARE OF THINGS AT HOME, OR GET ALONG WITH OTHER PEOPLE: NOT DIFFICULT AT ALL
3. WORRYING TOO MUCH ABOUT DIFFERENT THINGS: SEVERAL DAYS
5. BEING SO RESTLESS THAT IT IS HARD TO SIT STILL: SEVERAL DAYS

## 2018-01-01 ASSESSMENT — ACTIVITIES OF DAILY LIVING (ADL)
DEPENDENT_IADLS:: CLEANING;COOKING;LAUNDRY;SHOPPING;MEAL PREPARATION;MEDICATION MANAGEMENT;MONEY MANAGEMENT;TRANSPORTATION

## 2018-01-01 ASSESSMENT — PAIN SCALES - GENERAL: PAINLEVEL: MODERATE PAIN (4)

## 2018-01-01 ASSESSMENT — PATIENT HEALTH QUESTIONNAIRE - PHQ9
5. POOR APPETITE OR OVEREATING: MORE THAN HALF THE DAYS
SUM OF ALL RESPONSES TO PHQ QUESTIONS 1-9: 19

## 2018-01-04 ENCOUNTER — OFFICE VISIT (OUTPATIENT)
Dept: FAMILY MEDICINE | Facility: CLINIC | Age: 83
End: 2018-01-04
Payer: COMMERCIAL

## 2018-01-04 ENCOUNTER — OFFICE VISIT (OUTPATIENT)
Dept: BEHAVIORAL HEALTH | Facility: CLINIC | Age: 83
End: 2018-01-04

## 2018-01-04 VITALS
DIASTOLIC BLOOD PRESSURE: 67 MMHG | SYSTOLIC BLOOD PRESSURE: 99 MMHG | OXYGEN SATURATION: 92 % | HEART RATE: 55 BPM | WEIGHT: 129 LBS | HEIGHT: 63 IN | BODY MASS INDEX: 22.86 KG/M2 | TEMPERATURE: 97 F

## 2018-01-04 DIAGNOSIS — L30.9 DERMATITIS: ICD-10-CM

## 2018-01-04 DIAGNOSIS — F33.1 MAJOR DEPRESSIVE DISORDER, RECURRENT EPISODE, MODERATE (H): ICD-10-CM

## 2018-01-04 DIAGNOSIS — R41.3 MEMORY LOSS: ICD-10-CM

## 2018-01-04 DIAGNOSIS — E03.9 HYPOTHYROIDISM, UNSPECIFIED TYPE: ICD-10-CM

## 2018-01-04 DIAGNOSIS — E10.22 TYPE 1 DIABETES MELLITUS WITH DIABETIC CHRONIC KIDNEY DISEASE, UNSPECIFIED CKD STAGE (H): Primary | ICD-10-CM

## 2018-01-04 DIAGNOSIS — F33.0 MAJOR DEPRESSIVE DISORDER, RECURRENT EPISODE, MILD (H): ICD-10-CM

## 2018-01-04 DIAGNOSIS — N18.30 CKD (CHRONIC KIDNEY DISEASE) STAGE 3, GFR 30-59 ML/MIN (H): ICD-10-CM

## 2018-01-04 DIAGNOSIS — F32.1 MODERATE MAJOR DEPRESSION (H): Primary | ICD-10-CM

## 2018-01-04 PROCEDURE — 99214 OFFICE O/P EST MOD 30 MIN: CPT | Performed by: FAMILY MEDICINE

## 2018-01-04 PROCEDURE — 99207 ZZC NO CHARGE BEHAVIORAL WARM HANDOFF: CPT | Performed by: MARRIAGE & FAMILY THERAPIST

## 2018-01-04 RX ORDER — LEVOTHYROXINE SODIUM 125 UG/1
125 TABLET ORAL DAILY
Qty: 90 TABLET | Refills: 0 | Status: SHIPPED | OUTPATIENT
Start: 2018-01-04 | End: 2018-04-08

## 2018-01-04 NOTE — PROGRESS NOTES
SUBJECTIVE:  Claudia Presley, a 82 year old female scheduled an appointment to discuss the following issues:  Follow-up SUICIAL IDEATION - seen in hospital  Follow-up depression, memory difficulties, dm - insulin controlled, hypothyroid and cellulitis/dermatitis. Patient had normal ct head and renal panel. Blood sugars closely.   Patient without psych follow-up. They would like out patient psych. Patient will go back to doing insulin as before. No blood sugars <40 or >400 since discharge. Sleep ok. Appetite ok. Patient thinks feeling a little better but  here and little concerns about patient's depression. Limited exercise in winter and always a little down in winter. No SUICIAL IDEATION OR HOMOCIDAL IDEATION currently. Patient interested in higher thyroid dosage because some coldness/tired. Patient interested in seeing dm ed again.  Per hospital note:  BRIEF HOSPITAL COURSE:  Patient is an 82-year-old female with past medical history of diabetes who was brought to the ER by EMS due to insulin overdose as part of suicidal intention.  Patient was reported to have taken excessive doses of her routine insulin medications with NovoLog. She reportedly made statements that would suggest she was thinking of killing herself. For this reason, upon admission, she was seen by psychiatry who discussed with patient and spouse and ultimately the decision was made to increase her medications of Zoloft from 25 to 50 mg daily.  Due to this insulin overdose, blood sugars were managed with use of IV fluids and dextrose infusion which ultimately improved her blood sugars. Eventually, her usual home regimen with Lantus and NovoLog were restarted. However, on the night of 12/23/2017, she had significant hypoglycemia with blood sugar dropping to 23 which was felt to be a combination of poor food intake and use of NovoLog. For this reason, Lantus was decreased down to 5 units and NovoLog 4 units; however, today, blood sugar is  again running high and the Lantus was gradually increased to 9 units and NovoLog to 6 units 3 times daily. While on the floor, she received ongoing teaching and education regarding self-insulin administration. Nursing staff did observe the patient has spells of forgetfulness during their care for her. Her , however, declined having resources come to the house to help with insulin management and also did not want patient admitted to the geropsychiatric unit at [facility]. Again, the importance of insulin management was clearly explained to the  and the patient on 12/24/2017.  Other chronic medical conditions have largely remained stable while she was on the floor.  PENDING TESTS:  None.  CONDITION ON DISCHARGE:  Stable.  DISCHARGE MEDICATIONS:  Your Home Medicines   As of 12/25/2017 11:51 AM   CHANGE how you take these medicines   Instructions   insulin lispro 100 unit/mL injection   For diagnoses: Uncontrolled type 2 diabetes mellitus without complication, with long-term current use of insulin (HC)   What changed: how much to take   Commonly known as: HumaLOG   Inject 6 Units subcutaneous 3 times daily before meals.     LANTUS SOLOSTAR 100 unit/mL (3 mL) pen   For diagnoses: Uncontrolled type 2 diabetes mellitus without complication, with long-term current use of insulin (HC)   What changed: how much to take   Generic drug: insulin glargine   Inject 9 Units subcutaneous before bedtime.     sertraline 50 mg tablet   For diagnoses: Depression, unspecified depression type   What changed:   - medication strength  - how much to take   Start taking on: 12/26/2017   Commonly known as: ZOLOFT   Take 1 tablet by mouth once daily.    Past Medical History:   Diagnosis Date     COPD (chronic obstructive pulmonary disease) (H)      Depression      Diverticulosis of colon 2010    seen on colonoscopy     Hypertension      Hypothyroidism      Osteopenia      Osteoporosis      Peripheral edema      Spinal stenosis   "    Type I (juvenile type) diabetes mellitus without mention of complication, not stated as uncontrolled        Past Surgical History:   Procedure Laterality Date     BREAST LUMPECTOMY, RT/LT      Breast Lumpectomy RT/LT     CATARACT IOL, RT/LT       PHACOEMULSIFICATION WITH STANDARD INTRAOCULAR LENS IMPLANT  3/2007; 3/2007    left eye; right eye (STS)     TONSILLECTOMY         Family History   Problem Relation Age of Onset     Cancer - colorectal Father      PASSED AWAY FROM COLON CA     Alzheimer Disease Brother      PICKS DISEASE     Alzheimer Disease Sister      ON ARICEPT     Depression Sister      POST PARTUM     Respiratory Brother      COPD     Arthritis Sister      OSTEOPOROSIS Sister      Thyroid Disease Sister      Alzheimer Disease Mother      Breast Cancer Maternal Aunt        Social History   Substance Use Topics     Smoking status: Former Smoker     Types: Cigarettes     Quit date: 1/1/1998     Smokeless tobacco: Never Used      Comment: 1998     Alcohol use No       ROS:  All other ROS negative  OBJECTIVE:  BP 99/67  Pulse 55  Temp 97  F (36.1  C) (Oral)  Ht 5' 3\" (1.6 m)  Wt 129 lb (58.5 kg)  SpO2 92%  BMI 22.85 kg/m2  EXAM:  GENERAL APPEARANCE: healthy, alert and no distress  EYES: EOMI,  PERRL  HENT: ear canals and TM's normal and nose and mouth without ulcers or lesions  NECK: no adenopathy, no asymmetry, masses, or scars and thyroid normal to palpation  RESP: lungs clear to auscultation - no rales, rhonchi or wheezes  CV: regular rates and rhythm, normal S1 S2, no S3 or S4 and no murmur, click or rub -  ABDOMEN:  soft, nontender, no HSM or masses and bowel sounds normal  MS: extremities normal- no gross deformities noted, no evidence of inflammation in joints, FROM in all extremities.  SKIN:dry thick skin bilateral shins.  NEURO: good historian, grossly non-focal  PSYCH: mentation appears normal and affect normal/bright    ASSESSMENT / PLAN:  (E10.22,  N18.9) Type 1 diabetes mellitus with " diabetic chronic kidney disease, unspecified CKD stage (H)  (primary encounter diagnosis)  Comment: labile  Plan: DIABETES EDUCATOR REFERRAL        Will consult dm ed again - family interested. Continue old dosing because patient gets confused with any new/major changes. Patient and  have a glucogon pen. Recheck in 3 months.      (E03.9) Hypothyroidism, unspecified type  Comment: under treatment   Plan: levothyroxine (SYNTHROID/LEVOTHROID) 125 MCG         tablet        Cold and fatigue. Will bump up from 100 to 125 to winter. Recheck in 3 months      (F33.1) Major depressive disorder, recurrent episode, moderate (H)  Comment: no current plans.   Plan: sertraline (ZOLOFT) 50 MG tablet        Continue higher dosage. Consider back to neurology too. Family not interested in psych. If SUICIAL IDEATION OR HOMOCIDAL IDEATION OR HERMELINDA TO ER   Seen by our therapist today - thanks    (N18.3) CKD (chronic kidney disease) stage 3, GFR 30-59 ml/min  Comment: stable per hospital labs  Plan: more water. Recheck in 3 months      (L30.9) Dermatitis  Comment: worse with winter  Plan: again avoid scratching and keep nails short and limit shower/soap. Back to derm if needed. Expected course and warning signs reviewed.     (R41.3) Memory loss  Comment: seen   Plan: might need group home placement in future but  is closely monitor. Back to neurology if needed. Keep mentally and physically active.     [place] Finney

## 2018-01-04 NOTE — MR AVS SNAPSHOT
"              After Visit Summary   1/4/2018    Claudia Presley    MRN: [id number]           Patient Information     Date Of Birth          1935        Visit Information        Provider Department      1/4/2018 2:01 PM Mony Perla [facility]        Today's Diagnoses     Moderate major depression (H)    -  1    Memory loss           Follow-ups after your visit        Your next 10 appointments already scheduled     Jan 19, 2018  9:30 AM CST   Diabetic Education with AN DIABETIC ED RESOURCE   [facility] ([facility])    [address] [facility] [address] [phone]              Who to contact     If you have questions or need follow up information about today's clinic visit or your schedule please contact [facility] directly at [phone].  Normal or non-critical lab and imaging results will be communicated to you by [employer], letter or phone within 4 business days after the clinic has received the results. If you do not hear from us within 7 days, please contact the clinic through [employer] or phone. If you have a critical or abnormal lab result, we will notify you by phone as soon as possible.  Submit refill requests through [employer] or call your pharmacy and they will forward the refill request to us. Please allow 3 business days for your refill to be completed.          Additional Information About Your Visit        [employer] Information     [employer] lets you send messages to your doctor, view your test results, renew your prescriptions, schedule appointments and more. To sign up, go to [email or web address] . Click on \"Log in\" on the left side of the screen, which will take you to the Welcome page. Then click on \"Sign up Now\" on the right side of the page.     You will be asked to enter the access code listed below, as well as some personal information. Please follow the directions to create your username and " password.     Your access code is: [email or web address]  Expires: 2018  1:49 PM     Your access code will  in 90 days. If you need help or a new code, please call your [facility] or [phone].        Care EveryWhere ID     This is your Care EveryWhere ID. This could be used by other organizations to access your [place] medical records  [id number]         Blood Pressure from Last 3 Encounters:   18 99/67   10/23/17 135/69   07/10/17 102/67    Weight from Last 3 Encounters:   18 58.5 kg (129 lb)   10/23/17 61.2 kg (135 lb)   07/10/17 59.9 kg (132 lb)              Today, you had the following     No orders found for display         Today's Medication Changes          These changes are accurate as of: 18 11:59 PM.  If you have any questions, ask your nurse or doctor.               These medicines have changed or have updated prescriptions.        Dose/Directions    * levothyroxine 100 MCG tablet   Commonly known as:  SYNTHROID/LEVOTHROID   This may have changed:  Another medication with the same name was added. Make sure you understand how and when to take each.   Used for:  Hypothyroidism, unspecified type   Changed by:  David Finney MD        Dose:  100 mcg   Take 1 tablet (100 mcg) by mouth daily For thyroid Pharmacy ok to hold prescription until due   Quantity:  90 tablet   Refills:  1       * levothyroxine 125 MCG tablet   Commonly known as:  SYNTHROID/LEVOTHROID   This may have changed:  You were already taking a medication with the same name, and this prescription was added. Make sure you understand how and when to take each.   Used for:  Hypothyroidism, unspecified type   Changed by:  David Finney MD        Dose:  125 mcg   Take 1 tablet (125 mcg) by mouth daily This is a higher dosage for thyroid for energy/keep warmer   Quantity:  90 tablet   Refills:  0       * sertraline 25 MG tablet   Commonly known as:  ZOLOFT   This may have changed:  Another medication with the  same name was added. Make sure you understand how and when to take each.   Used for:  Major depressive disorder, recurrent episode, mild (H)   Changed by:  David Finney MD        Dose:  25 mg   Take 1 tablet (25 mg) by mouth daily for energy/depression and anxiety   Quantity:  90 tablet   Refills:  1       * sertraline 50 MG tablet   Commonly known as:  ZOLOFT   This may have changed:  You were already taking a medication with the same name, and this prescription was added. Make sure you understand how and when to take each.   Used for:  Major depressive disorder, recurrent episode, moderate (H)   Changed by:  David Finney MD        Dose:  50 mg   Take 1 tablet (50 mg) by mouth daily For depression/anxiety (this is double dosage from before).   Quantity:  90 tablet   Refills:  1       * Notice:  This list has 4 medication(s) that are the same as other medications prescribed for you. Read the directions carefully, and ask your doctor or other care provider to review them with you.         Where to get your medicines      These medications were sent to [id number] - [place], [place] - [address]  [address] [address] [facility] [address]     Phone:  [phone]     levothyroxine 125 MCG tablet    sertraline 50 MG tablet                Primary Care Provider Office Phone # Fax #    David Finney [phone] [address] [facility] [address]        Equal Access to Services     KIRAN SHERIFF AH: Hadsilvina madsen Sotessy, waaxda luqadaha, qaybta kaalmafermin kimble, micky washington . So [facility] [phone].    ATENCIÓN: Si habla español, tiene a herndon disposición servicios gratuitos de asistencia lingüística. Wing al [phone].    We comply with applicable federal civil rights laws and [place] laws. We do not discriminate on the basis of race, color, national origin, age, disability, sex, sexual orientation, or gender identity.             Thank you!     Thank you for choosing [facility]  for your care. Our goal is always to provide you with excellent care. Hearing back from our patients is one way we can continue to improve our services. Please take a few minutes to complete the written survey that you may receive in the mail after your visit with us. Thank you!             Your Updated Medication List - Protect others around you: Learn how to safely use, store and throw away your medicines at www.disposemymeds.org.          This list is accurate as of: 1/4/18 11:59 PM.  Always use your most recent med list.                   Brand Name Dispense Instructions for use Diagnosis    amLODIPine 2.5 MG tablet    NORVASC    180 tablet    TAKE 2 TABLETS BY MOUTH DAILY    Essential hypertension with goal blood pressure less than 140/90       aspirin 81 MG tablet      1 TABLET DAILY        blood glucose lancets standard    no brand specified    100 each    Use to test blood sugar 4 times daily or as directed. 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 Dispense One Touch Ultra or per patient's insurance    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)       blood glucose monitoring test strip    no brand specified    3 Box    In Vitro route 4 times daily. Dispense One Touch Ultra test strips or per patient/insurance preference    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)       budesonide-formoterol 160-4.5 MCG/ACT Inhaler    SYMBICORT    3 Inhaler    Inhale 1 puff into the lungs 2 times daily Daily inhaler for copd Pharmacy ok to hold prescription until due    Chronic bronchitis, unspecified chronic bronchitis type (H)       cetirizine 10 MG tablet    zyrTEC    30 tablet    TAKE ONE TABLET  BY MOUTH IN THE EVENING AS NEEDED FOR ITCHING / RASH    Rash       cloNIDine 0.1 MG tablet    CATAPRES    180 tablet    TAKE 1 TABLET BY MOUTH 2 TIMES DAILY FOR BLOOD PRESSURE    Essential hypertension with goal blood pressure less than 140/90       doxycycline 100 MG capsule    VIBRAMYCIN    20 capsule    TAKE ONE CAPSULE BY MOUTH TWICE DAILY with food    Cellulitis of leg, right       fluocinonide 0.05 % cream    LIDEX    120 g    Apply sparingly to affected area twice daily for up to 14 days.  Do not apply to face.    Dermatitis       glucagon 1 MG kit    GLUCAGON EMERGENCY    1 mg    Inject 1 mg into the muscle once for 1 dose    Type 1 diabetes mellitus with diabetic chronic kidney disease, unspecified CKD stage (H)       * HumaLOG KWIKpen 100 UNIT/ML injection   Generic drug:  insulin lispro     1 mL    11 units before breakfast, 11 units before lunch, 11 units before dinner 3 month supply please    Type 1 diabetes mellitus with diabetic chronic kidney disease, unspecified CKD stage (H)       * HumaLOG KWIKpen 100 UNIT/ML injection   Generic drug:  insulin lispro     15 mL    INJECT 13 UNITS UNDER THE SKIN BEFORE BREAKFAST, LUNCH, AND DINNER    Type 1 diabetes mellitus with diabetic chronic kidney disease, unspecified CKD stage (H)       * HumaLOG KWIKpen 100 UNIT/ML injection   Generic drug:  insulin lispro     15 mL    INJECT 13 UNITS UNDER THE SKIN BEFORE BREAKFAST, LUNCH, AND DINNER    Type 1 diabetes mellitus with diabetic chronic kidney disease, unspecified CKD stage (H)       * insulin glargine 100 UNIT/ML injection    LANTUS SOLOSTAR    1 mL    Inject 9 Units Subcutaneous At Bedtime    Type 1 diabetes mellitus with diabetic chronic kidney disease, unspecified CKD stage (H)       * LANTUS SOLOSTAR 100 UNIT/ML injection   Generic drug:  insulin glargine     6 mL    Inject 11 Units Subcutaneously At Bedtime    Type 1 diabetes mellitus with diabetic chronic kidney disease, unspecified CKD stage (H)       *  insulin pen needle 29G X 12.7MM     400 each    1 Units 4 times daily (before meals and nightly). Please dispense Rachel Needles    Type 1 diabetes, HbA1c goal < 8% (H)       * insulin pen needle 32G X 4 MM    BD RACHEL U/F    400 each    Use four times daily.    Type 1 diabetes, HbA1c goal < 8% (H)       * BD RACHEL U/F 32G X 4 MM   Generic drug:  insulin pen needle     200 each    use 4 times daily    Type 1 diabetes mellitus with diabetic chronic kidney disease, unspecified CKD stage (H)       * BD RACHEL U/F 32G X 4 MM   Generic drug:  insulin pen needle     200 each    use 4 times daily    Type 1 diabetes mellitus with diabetic chronic kidney disease, unspecified CKD stage (H)       * BD RACHEL U/F 32G X 4 MM   Generic drug:  insulin pen needle     200 each    use 4 times daily    Type 1 diabetes, HbA1c goal < 8% (H)       ipratropium 17 MCG/ACT Inhaler    ATROVENT HFA    12.9 Inhaler    Inhale 2 puffs into the lungs 4 times daily Pharmacy ok to hold prescription until due    COPD (chronic obstructive pulmonary disease) (H)       * levothyroxine 100 MCG tablet    SYNTHROID/LEVOTHROID    90 tablet    Take 1 tablet (100 mcg) by mouth daily For thyroid Pharmacy ok to hold prescription until due    Hypothyroidism, unspecified type       * levothyroxine 125 MCG tablet    SYNTHROID/LEVOTHROID    90 tablet    Take 1 tablet (125 mcg) by mouth daily This is a higher dosage for thyroid for energy/keep warmer    Hypothyroidism, unspecified type       LIFESCAN FINEPOINT LANCETS Misc     100 each    Use to test blood sugars 4 times daily or as directed.  Brand per patient/insurance preference    Type 1 diabetes, HbA1c goal < 8% (H)       losartan 25 MG tablet    COZAAR    45 tablet    Take 0.5 tablets (12.5 mg) by mouth daily For blood pressure with breakfast    CKD (chronic kidney disease) stage 3, GFR 30-59 ml/min       metoprolol 50 MG 24 hr tablet    TOPROL-XL    90 tablet    Take 1 tablet (50 mg) by mouth daily For blood  pressure and lowers pulse. Pharmacy ok to hold prescription until due    Essential hypertension with goal blood pressure less than 140/90       * sertraline 25 MG tablet    ZOLOFT    90 tablet    Take 1 tablet (25 mg) by mouth daily for energy/depression and anxiety    Major depressive disorder, recurrent episode, mild (H)       * sertraline 50 MG tablet    ZOLOFT    90 tablet    Take 1 tablet (50 mg) by mouth daily For depression/anxiety (this is double dosage from before).    Major depressive disorder, recurrent episode, moderate (H)       simvastatin 20 MG tablet    ZOCOR    90 tablet    TAKE ONE TABLET BY MOUTH AT BEDTIME for cholesterol    Hyperlipidemia LDL goal <100       triamcinolone 0.1 % cream    KENALOG    80 g    Apply sparingly to affected area two times daily for up 10 days.    Dermatitis       * Notice:  This list has 14 medication(s) that are the same as other medications prescribed for you. Read the directions carefully, and ask your doctor or other care provider to review them with you.

## 2018-01-04 NOTE — MR AVS SNAPSHOT
After Visit Summary   1/4/2018    Claudia Presley    MRN: [id number]           Patient Information     Date Of Birth          1935        Visit Information        Provider Department      1/4/2018 9:20 AM David Finney MD [facility]        Today's Diagnoses     Type 1 diabetes mellitus with diabetic chronic kidney disease, unspecified CKD stage (H)    -  1    Hypothyroidism, unspecified type        Major depressive disorder, recurrent episode, moderate (H)        CKD (chronic kidney disease) stage 3, GFR 30-59 ml/min        Dermatitis        Major depressive disorder, recurrent episode, mild (H)        Memory loss           Follow-ups after your visit        Additional Services     DIABETES EDUCATOR REFERRAL       DIABETES SELF MANAGEMENT TRAINING (DSMT)      Your provider has referred you to Diabetes Education: [facility]: Diabetes Education - All [facility] [phone]   [email or web address]     If an urgent visit is needed or A1C is above 12, Care Team to call the Diabetes  Education Team at [phone] or send an In Basket message to the Diabetes Education Pool (P DIAB ED-PATIENT CARE).    A  will call you to make your appointment. If it has been more than 3 business days since your referral was placed, please call the above phone number to schedule.    Type of training and number of hours: Previous Diagnosis: Follow-up DSMT - 2 hours.    Medicare covers: 10 hours of initial DSMT in 12 month period from the time of first visit, plus 2 hours of follow-up DSMT annually, and additional hours as requested for insulin training.    Diabetes Type: Type 1             Diabetes Co-Morbidities: none               A1C Goal:  <8.0       A1C is: Lab Results       Component                Value               Date                       A1C                      7.2                 10/23/2017              Diabetes Education Topics:  tune up. History memory loss and labile sugars.    ]      MEDICAL NUTRITION THERAPY (MNT) for Diabetes    Medical Nutrition Therapy with a Registered Dietitian can be provided in coordination with Diabetes Self-Management Training to assist in achieving optimal diabetes management.                       Medicare will cover: 3 hours initial MNT in 12 month period after first visit, plus 2 hours of follow-up MNT annually    Please be aware that coverage of these services is subject to the terms and limitations of your health insurance plan.  Call member services at your health plan to determine Diabetes Self-Management Training (Codes  &amp; ) and Medical Nutrition Therapy (Codes 19653 & 83835) benefits and ask which blood glucose monitor brands are covered by your plan.  Please bring the following with you to your appointment:    (1)  List of current medications   (2)  List of Blood Glucose Monitor brands that are covered by your insurance plan  (3)  Blood Glucose Monitor and log book  (4)   Food records for the 3 days prior to your visit    The Certified Diabetes Educator may make diabetes medication adjustments per the CDE Protocol and Collaborative Practice Agreement.                  Who to contact     If you have questions or need follow up information about today's clinic visit or your schedule please contact [facility] directly at [phone].  Normal or non-critical lab and imaging results will be communicated to you by MyChart, letter or phone within 4 business days after the clinic has received the results. If you do not hear from us within 7 days, please contact the clinic through MyChart or phone. If you have a critical or abnormal lab result, we will notify you by phone as soon as possible.  Submit refill requests through [employer] or call your pharmacy and they will forward the refill request to us. Please allow 3 business days for your refill to be completed.          Additional  "Information About Your Visit        MyChart Information     [employer] lets you send messages to your doctor, view your test results, renew your prescriptions, schedule appointments and more. To sign up, go to [email or web address] . Click on \"Log in\" on the left side of the screen, which will take you to the Welcome page. Then click on \"Sign up Now\" on the right side of the page.     You will be asked to enter the access code listed below, as well as some personal information. Please follow the directions to create your username and password.     Your access code is: [email or web address]  Expires: 2018  1:49 PM     Your access code will  in 90 days. If you need help or a new code, please call your [facility] clinic or [phone].        Care EveryWhere ID     This is your Care EveryWhere ID. This could be used by other organizations to access your [facility] medical records  [id number]        Your Vitals Were     Pulse Temperature Height Pulse Oximetry BMI (Body Mass Index)       55 97  F (36.1  C) (Oral) 5' 3\" (1.6 m) 92% 22.85 kg/m2        Blood Pressure from Last 3 Encounters:   18 99/67   10/23/17 135/69   07/10/17 102/67    Weight from Last 3 Encounters:   18 129 lb (58.5 kg)   10/23/17 135 lb (61.2 kg)   07/10/17 132 lb (59.9 kg)              We Performed the Following     DEPRESSION ACTION PLAN (DAP)     DIABETES EDUCATOR REFERRAL     PAF COMPLETED          Today's Medication Changes          These changes are accurate as of: 18 10:15 AM.  If you have any questions, ask your nurse or doctor.               These medicines have changed or have updated prescriptions.        Dose/Directions    * levothyroxine 100 MCG tablet   Commonly known as:  SYNTHROID/LEVOTHROID   This may have changed:  Another medication with the same name was added. Make sure you understand how and when to take each.   Used for:  Hypothyroidism, unspecified type        Dose:  100 mcg   Take 1 tablet (100 mcg) by mouth " daily For thyroid Pharmacy ok to hold prescription until due   Quantity:  90 tablet   Refills:  1       * levothyroxine 125 MCG tablet   Commonly known as:  SYNTHROID/LEVOTHROID   This may have changed:  You were already taking a medication with the same name, and this prescription was added. Make sure you understand how and when to take each.   Used for:  Hypothyroidism, unspecified type        Dose:  125 mcg   Take 1 tablet (125 mcg) by mouth daily This is a higher dosage for thyroid for energy/keep warmer   Quantity:  90 tablet   Refills:  0       * sertraline 25 MG tablet   Commonly known as:  ZOLOFT   This may have changed:  Another medication with the same name was added. Make sure you understand how and when to take each.   Used for:  Major depressive disorder, recurrent episode, mild (H)        Dose:  25 mg   Take 1 tablet (25 mg) by mouth daily for energy/depression and anxiety   Quantity:  90 tablet   Refills:  1       * sertraline 50 MG tablet   Commonly known as:  ZOLOFT   This may have changed:  You were already taking a medication with the same name, and this prescription was added. Make sure you understand how and when to take each.   Used for:  Major depressive disorder, recurrent episode, moderate (H)        Dose:  50 mg   Take 1 tablet (50 mg) by mouth daily For depression/anxiety (this is double dosage from before).   Quantity:  90 tablet   Refills:  1       * Notice:  This list has 4 medication(s) that are the same as other medications prescribed for you. Read the directions carefully, and ask your doctor or other care provider to review them with you.         Where to get your medicines      These medications were sent to [id number] - [place] - [address]  [address] [facility] [place] [address]     Phone:  [phone]     levothyroxine 125 MCG tablet    sertraline 50 MG tablet                Primary Care Provider Office Phone # Fax #    David Finney MD  [phone]       [address] [address]        Equal Access to Services     KIRAN SHERIFF : Hadii matias dickens tena Briggs, wawilfredoda luqnegro, qaybta kaalmada shyanne, micky giangmonica bruce. So [facility] [phone].    ATENCIÓN: Si habla español, tiene a herndon disposición servicios gratuitos de asistencia lingüística. Llame al [phone].    We comply with applicable federal civil rights laws and [place] laws. We do not discriminate on the basis of race, color, national origin, age, disability, sex, sexual orientation, or gender identity.            Thank you!     Thank you for choosing [facility]  for your care. Our goal is always to provide you with excellent care. Hearing back from our patients is one way we can continue to improve our services. Please take a few minutes to complete the written survey that you may receive in the mail after your visit with us. Thank you!             Your Updated Medication List - Protect others around you: Learn how to safely use, store and throw away your medicines at www.disposemymeds.org.          This list is accurate as of: 1/4/18 10:15 AM.  Always use your most recent med list.                   Brand Name Dispense Instructions for use Diagnosis    amLODIPine 2.5 MG tablet    NORVASC    180 tablet    TAKE 2 TABLETS BY MOUTH DAILY    Essential hypertension with goal blood pressure less than 140/90       aspirin 81 MG tablet      1 TABLET DAILY        blood glucose lancets standard    no brand specified    100 each    Use to test blood sugar 4 times daily or as directed. 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 Dispense One Touch Ultra or per patient's insurance    Type 1 diabetes mellitus with diabetic  chronic kidney disease (H)       blood glucose monitoring test strip    no brand specified    3 Box    In Vitro route 4 times daily. Dispense One Touch Ultra test strips or per patient/insurance preference    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)       budesonide-formoterol 160-4.5 MCG/ACT Inhaler    SYMBICORT    3 Inhaler    Inhale 1 puff into the lungs 2 times daily Daily inhaler for copd Pharmacy ok to hold prescription until due    Chronic bronchitis, unspecified chronic bronchitis type (H)       cetirizine 10 MG tablet    zyrTEC    30 tablet    TAKE ONE TABLET BY MOUTH IN THE EVENING AS NEEDED FOR ITCHING / RASH    Rash       cloNIDine 0.1 MG tablet    CATAPRES    180 tablet    TAKE 1 TABLET BY MOUTH 2 TIMES DAILY FOR BLOOD PRESSURE    Essential hypertension with goal blood pressure less than 140/90       doxycycline 100 MG capsule    VIBRAMYCIN    20 capsule    TAKE ONE CAPSULE BY MOUTH TWICE DAILY with food    Cellulitis of leg, right       fluocinonide 0.05 % cream    LIDEX    120 g    Apply sparingly to affected area twice daily for up to 14 days.  Do not apply to face.    Dermatitis       glucagon 1 MG kit    GLUCAGON EMERGENCY    1 mg    Inject 1 mg into the muscle once for 1 dose    Type 1 diabetes mellitus with diabetic chronic kidney disease, unspecified CKD stage (H)       * HumaLOG KWIKpen 100 UNIT/ML injection   Generic drug:  insulin lispro     1 mL    11 units before breakfast, 11 units before lunch, 11 units before dinner 3 month supply please    Type 1 diabetes mellitus with diabetic chronic kidney disease, unspecified CKD stage (H)       * HumaLOG KWIKpen 100 UNIT/ML injection   Generic drug:  insulin lispro     15 mL    INJECT 13 UNITS UNDER THE SKIN BEFORE BREAKFAST, LUNCH, AND DINNER    Type 1 diabetes mellitus with diabetic chronic kidney disease, unspecified CKD stage (H)       * HumaLOG KWIKpen 100 UNIT/ML injection   Generic drug:  insulin lispro     15 mL     INJECT 13 UNITS UNDER THE SKIN BEFORE BREAKFAST, LUNCH, AND DINNER    Type 1 diabetes mellitus with diabetic chronic kidney disease, unspecified CKD stage (H)       * insulin glargine 100 UNIT/ML injection    LANTUS SOLOSTAR    1 mL    Inject 9 Units Subcutaneous At Bedtime    Type 1 diabetes mellitus with diabetic chronic kidney disease, unspecified CKD stage (H)       * LANTUS SOLOSTAR 100 UNIT/ML injection   Generic drug:  insulin glargine     6 mL    Inject 11 Units Subcutaneously At Bedtime    Type 1 diabetes mellitus with diabetic chronic kidney disease, unspecified CKD stage (H)       * insulin pen needle 29G X 12.7MM     400 each    1 Units 4 times daily (before meals and nightly). Please dispense Rachel Needles    Type 1 diabetes, HbA1c goal < 8% (H)       * insulin pen needle 32G X 4 MM    BD RACHEL U/F    400 each    Use four times daily.    Type 1 diabetes, HbA1c goal < 8% (H)       * BD RACHEL U/F 32G X 4 MM   Generic drug:  insulin pen needle     200 each    use 4 times daily    Type 1 diabetes mellitus with diabetic chronic kidney disease, unspecified CKD stage (H)       * BD RACHEL U/F 32G X 4 MM   Generic drug:  insulin pen needle     200 each    use 4 times daily    Type 1 diabetes mellitus with diabetic chronic kidney disease, unspecified CKD stage (H)       * BD RACHEL U/F 32G X 4 MM   Generic drug:  insulin pen needle     200 each    use 4 times daily    Type 1 diabetes, HbA1c goal < 8% (H)       ipratropium 17 MCG/ACT Inhaler    ATROVENT HFA    12.9 Inhaler    Inhale 2 puffs into the lungs 4 times daily Pharmacy ok to hold prescription until due    COPD (chronic obstructive pulmonary disease) (H)       * levothyroxine 100 MCG tablet    SYNTHROID/LEVOTHROID    90 tablet    Take 1 tablet (100 mcg) by mouth daily For thyroid Pharmacy ok to hold prescription until due    Hypothyroidism, unspecified type       * levothyroxine 125 MCG tablet    SYNTHROID/LEVOTHROID    90 tablet    Take 1 tablet (125 mcg) by  mouth daily This is a higher dosage for thyroid for energy/keep warmer    Hypothyroidism, unspecified type       LIFESCAN FINEPOINT LANCETS Misc     100 each    Use to test blood sugars 4 times daily or as directed.  Brand per patient/insurance preference    Type 1 diabetes, HbA1c goal < 8% (H)       losartan 25 MG tablet    COZAAR    45 tablet    Take 0.5 tablets (12.5 mg) by mouth daily For blood pressure with breakfast    CKD (chronic kidney disease) stage 3, GFR 30-59 ml/min       metoprolol 50 MG 24 hr tablet    TOPROL-XL    90 tablet    Take 1 tablet (50 mg) by mouth daily For blood pressure and lowers pulse. Pharmacy ok to hold prescription until due    Essential hypertension with goal blood pressure less than 140/90       * sertraline 25 MG tablet    ZOLOFT    90 tablet    Take 1 tablet (25 mg) by mouth daily for energy/depression and anxiety    Major depressive disorder, recurrent episode, mild (H)       * sertraline 50 MG tablet    ZOLOFT    90 tablet    Take 1 tablet (50 mg) by mouth daily For depression/anxiety (this is double dosage from before).    Major depressive disorder, recurrent episode, moderate (H)       simvastatin 20 MG tablet    ZOCOR    90 tablet    TAKE ONE TABLET BY MOUTH AT BEDTIME for cholesterol    Hyperlipidemia LDL goal <100       triamcinolone 0.1 % cream    KENALOG    80 g    Apply sparingly to affected area two times daily for up 10 days.    Dermatitis       * Notice:  This list has 14 medication(s) that are the same as other medications prescribed for you. Read the directions carefully, and ask your doctor or other care provider to review them with you.

## 2018-01-05 NOTE — PROGRESS NOTES
"Warm-handoff    BEHAVIORAL HEALTH CLINICIAN introduced and explained integrated health model, brief therapy interventions and referral and support services for ongoing therapy interventions.   Patient has memory loss, lives with  Nando who is present with her at appointment. Patient reports increased depressed moods due to decreased social interactions, children [place] state and one son doesn't visit much who lives int he area. Patient and  report most of their close friends have already , recent death of friends a few weeks ago. Patient was diagnosed with diabetes in hor 30's; has been having difficult time managing the condition. Patient recently overdosed on her insulin on purpose with suicidal thoughts. Patient reports currently not having suicidal thoughts. Difficult for patient to hold conversation as repeats the same things. Patient not interested in therapy services at this time; did offer BHP for therapist that would be covered under her insurance     Patient denies current suicidal thoughts, no intent to harm herself.  reports diabetic education would not be helpful as patient would \"argue\" with hem and not comply with their instructions.   "

## 2018-01-19 ENCOUNTER — ALLIED HEALTH/NURSE VISIT (OUTPATIENT)
Dept: EDUCATION SERVICES | Facility: CLINIC | Age: 83
End: 2018-01-19
Payer: COMMERCIAL

## 2018-01-19 DIAGNOSIS — E10.22 TYPE 1 DIABETES MELLITUS WITH DIABETIC CHRONIC KIDNEY DISEASE, UNSPECIFIED CKD STAGE (H): Primary | ICD-10-CM

## 2018-01-19 DIAGNOSIS — E11.9 DIABETES MELLITUS WITHOUT COMPLICATION (H): ICD-10-CM

## 2018-01-19 PROCEDURE — G0108 DIAB MANAGE TRN  PER INDIV: HCPCS

## 2018-01-19 PROCEDURE — 99207 ZZC DROP WITH A PROCEDURE: CPT

## 2018-01-19 RX ORDER — FLASH GLUCOSE SENSOR
1 KIT MISCELLANEOUS CONTINUOUS
Qty: 1 DEVICE | Refills: 0 | Status: SHIPPED | OUTPATIENT
Start: 2018-01-19 | End: 2018-02-16

## 2018-01-19 RX ORDER — FLASH GLUCOSE SENSOR
KIT MISCELLANEOUS
Qty: 3 EACH | Refills: 3 | Status: SHIPPED | OUTPATIENT
Start: 2018-01-19 | End: 2018-02-16

## 2018-01-19 NOTE — MR AVS SNAPSHOT
After Visit Summary   1/19/2018    Claudia Presley    MRN: [id number]           Patient Information     Date Of Birth          1935        Visit Information        Provider Department      1/19/2018 9:30 AM AN DIABETIC ED RESOURCE [facility]        Today's Diagnoses     Type 1 diabetes mellitus with diabetic chronic kidney disease, unspecified CKD stage (H)    -  1      Care Instructions    My Diabetes Care Goals:  Eating:  May have some cookies, gladys crackers and peanut.     Monitoring: using the Cesar continuous glucose monitor.  The sensors need to be replaced every 10 days.  When you go to the pharmacy,  the device (scanner) and 3 sensors.     When BG is low, take oj and rescan in 15 minutes then eat and take insulin.    Taking Medication: Toujeo U300 give 9 units when she wakes up  Humalog: take 11 units with breakfast and 11 units with lunch and 11 units with dinner.      Follow up:  Follow-up diabetes education appointment scheduled on Friday Feb. 9 @ 11:30.      Bring blood glucose meter and logbook with you to all doctor and follow-up appointments.     [facility] for the [facility]:  For Your Diabetes Education and Nutrition Appointments Call:  [phone]   For Diabetes Education or Nutrition Related Questions:   Phone: [phone]  E-mail: [email or web address]  Fax: [phone]   If you need a medication refill please contact your pharmacy. Please allow 3 business days for your refills to be completed.    Instructions for emailing the Diabetes Educators    If you need to communicate a non-urgent message to a Diabetes Educator via email, please send to [email or web address].    Please follow the following email guidelines:    Subject line: Secure: your clinic name (example: Secure: [facility])  In the email please include: First name, middle initial, last name and date of birth.    We will be in  touch with you within one (1) business day.   Hi everyone,    As you all know, Freestyle Cesar is now available for Medicare patients. Most patients should expect to pay $0 or close to it after meeting their deductible. Some patients may have to pay up to $50. The first prescription will cost more (up to $100), since they will need to purchase the reader as well.     If you have Medicare patients interested in Freestyle Cesar, the first step is to reach out to the DME of your choice to get the process started. The DME will check the patient's eligibility and generate the necessary paperwork.    The 6 DMEs that you can use for Freestyle Cesar are [employer], [employer], [employer], [facility], [employer], and [email or web address].              Follow-ups after your visit        Your next 10 appointments already scheduled     Feb 09, 2018 11:30 AM CST   Diabetic Education with AN DIABETIC ED RESOURCE   [facility]    [address] [address] [phone]              Who to contact     If you have questions or need follow up information about today's clinic visit or your schedule please contact [facility] directly at [phone].  Normal or non-critical lab and imaging results will be communicated to you by OnlineSheetMusichart, letter or phone within 4 business days after the clinic has received the results. If you do not hear from us within 7 days, please contact the clinic through OnlineSheetMusichart or phone. If you have a critical or abnormal lab result, we will notify you by phone as soon as possible.  Submit refill requests through [email or web address] or call your pharmacy and they will forward the refill request to us. Please allow 3 business days for your refill to be completed.          Additional Information About Your Visit        [email or web address] Information     [email or web address] lets you send messages to your doctor, view your test results, renew your  "prescriptions, schedule appointments and more. To sign up, go to [email or web address] . Click on \"Log in\" on the left side of the screen, which will take you to the Welcome page. Then click on \"Sign up Now\" on the right side of the page.     You will be asked to enter the access code listed below, as well as some personal information. Please follow the directions to create your username and password.     Your access code is: [email or web address]  Expires: 2018  1:49 PM     Your access code will  in 90 days. If you need help or a new code, please call your [place] clinic or [phone].        Care EveryWhere ID     This is your Care EveryWhere ID. This could be used by other organizations to access your [place] medical records  [id number]         Blood Pressure from Last 3 Encounters:   18 99/67   10/23/17 135/69   07/10/17 102/67    Weight from Last 3 Encounters:   18 58.5 kg (129 lb)   10/23/17 61.2 kg (135 lb)   07/10/17 59.9 kg (132 lb)              Today, you had the following     No orders found for display         Today's Medication Changes          These changes are accurate as of: 18 11:06 AM.  If you have any questions, ask your nurse or doctor.               Start taking these medicines.        Dose/Directions    continuous blood glucose monitoring sensor   Used for:  Type 1 diabetes mellitus with diabetic chronic kidney disease, unspecified CKD stage (H)        For use with Freestyle Cesar Flash  for continuous monitioring of blood glucose levels. Replace sensor every 10 days.   Quantity:  3 each   Refills:  3       FREESTYLE CESAR READER Gale   Used for:  Type 1 diabetes mellitus with diabetic chronic kidney disease, unspecified CKD stage (H)        Dose:  1 Device   1 Device continuous   Quantity:  1 Device   Refills:  0         These medicines have changed or have updated prescriptions.        Dose/Directions    * insulin glargine 100 UNIT/ML injection   Commonly " known as:  LANTUS SOLOSTAR   This may have changed:  Another medication with the same name was added. Make sure you understand how and when to take each.   Used for:  Type 1 diabetes mellitus with diabetic chronic kidney disease, unspecified CKD stage (H)        Dose:  9 Units   Inject 9 Units Subcutaneous At Bedtime   Quantity:  1 mL   Refills:  0       * LANTUS SOLOSTAR 100 UNIT/ML injection   This may have changed:  Another medication with the same name was added. Make sure you understand how and when to take each.   Used for:  Type 1 diabetes mellitus with diabetic chronic kidney disease, unspecified CKD stage (H)   Generic drug:  insulin glargine        Inject 11 Units Subcutaneously At Bedtime   Quantity:  6 mL   Refills:  0       * insulin glargine U-300 300 UNIT/ML injection   Commonly known as:  TOUJEO   This may have changed:  You were already taking a medication with the same name, and this prescription was added. Make sure you understand how and when to take each.   Used for:  Type 1 diabetes mellitus with diabetic chronic kidney disease, unspecified CKD stage (H)        Dose:  9 Units   Inject 9 Units Subcutaneous every morning   Quantity:  13.5 mL   Refills:  3       * Notice:  This list has 3 medication(s) that are the same as other medications prescribed for you. Read the directions carefully, and ask your doctor or other care provider to review them with you.         Where to get your medicines      These medications were sent to [id number] - [place] - [address]  [address] [place] [address]     Phone:  [phone]     insulin glargine U-300 300 UNIT/ML injection         These medications were sent to [facility] [address]  [place] - [address] [address] [place] [address]     Phone:  [phone]     continuous blood glucose monitoring sensor    FREESTYLE WENDY READER Gale                Primary  Care Provider Office Phone # Fax #    [place] Thomas Finney [phone] [address] [address] [address]        Equal Access to Services     KIRAN SHERIFF : Ny matias dickens serenao Sotessy, waaxda luqadaha, qaybta kaalmada eitanda, micky galindo laSharynchelsea barrera. So [facility] [phone].    ATENCIÓN: Si habla español, tiene a herndon disposición servicios gratuitos de asistencia lingüística. Llame al [phone].    We comply with applicable federal civil rights laws and [place] laws. We do not discriminate on the basis of race, color, national origin, age, disability, sex, sexual orientation, or gender identity.            Thank you!     Thank you for choosing [facility]  for your care. Our goal is always to provide you with excellent care. Hearing back from our patients is one way we can continue to improve our services. Please take a few minutes to complete the written survey that you may receive in the mail after your visit with us. Thank you!             Your Updated Medication List - Protect others around you: Learn how to safely use, store and throw away your medicines at www.disposemymeds.org.          This list is accurate as of: 1/19/18 11:06 AM.  Always use your most recent med list.                   Brand Name Dispense Instructions for use Diagnosis    amLODIPine 2.5 MG tablet    NORVASC    180 tablet    TAKE 2 TABLETS BY MOUTH DAILY    Essential hypertension with goal blood pressure less than 140/90       aspirin 81 MG tablet      1 TABLET DAILY        blood glucose lancets standard    no brand specified    100 each    Use to test blood sugar 4 times daily or as directed. 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 Dispense One Touch Ultra  or per patient's insurance    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)       blood glucose monitoring test strip    no brand specified    3 Box    In Vitro route 4 times daily. Dispense One Touch Ultra test strips or per patient/insurance preference    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)       budesonide-formoterol 160-4.5 MCG/ACT Inhaler    SYMBICORT    3 Inhaler    Inhale 1 puff into the lungs 2 times daily Daily inhaler for copd Pharmacy ok to hold prescription until due    Chronic bronchitis, unspecified chronic bronchitis type (H)       cetirizine 10 MG tablet    zyrTEC    90 tablet    TAKE ONE TABLET BY MOUTH IN THE EVENING AS NEEDED FOR ITCHING / RASH    Rash       cloNIDine 0.1 MG tablet    CATAPRES    180 tablet    TAKE 1 TABLET BY MOUTH 2 TIMES DAILY FOR BLOOD PRESSURE    Essential hypertension with goal blood pressure less than 140/90       continuous blood glucose monitoring sensor     3 each    For use with Freestyle Cesar Flash  for continuous monitioring of blood glucose levels. Replace sensor every 10 days.    Type 1 diabetes mellitus with diabetic chronic kidney disease, unspecified CKD stage (H)       doxycycline 100 MG capsule    VIBRAMYCIN    20 capsule    TAKE ONE CAPSULE BY MOUTH TWICE DAILY with food    Cellulitis of leg, right       fluocinonide 0.05 % cream    LIDEX    120 g    Apply sparingly to affected area twice daily for up to 14 days.  Do not apply to face.    Dermatitis       FREESTYLE CESAR READER Gale     1 Device    1 Device continuous    Type 1 diabetes mellitus with diabetic chronic kidney disease, unspecified CKD stage (H)       glucagon 1 MG kit    GLUCAGON EMERGENCY    1 mg    Inject 1 mg into the muscle once for 1 dose    Type 1 diabetes mellitus with diabetic chronic kidney disease, unspecified CKD stage (H)       * HumaLOG KWIKpen 100 UNIT/ML injection   Generic drug:  insulin lispro     1 mL    11 units before  breakfast, 11 units before lunch, 11 units before dinner 3 month supply please    Type 1 diabetes mellitus with diabetic chronic kidney disease, unspecified CKD stage (H)       * HumaLOG KWIKpen 100 UNIT/ML injection   Generic drug:  insulin lispro     15 mL    INJECT 13 UNITS UNDER THE SKIN BEFORE BREAKFAST, LUNCH, AND DINNER    Type 1 diabetes mellitus with diabetic chronic kidney disease, unspecified CKD stage (H)       * HumaLOG KWIKpen 100 UNIT/ML injection   Generic drug:  insulin lispro     15 mL    INJECT 13 UNITS UNDER THE SKIN BEFORE BREAKFAST, LUNCH, AND DINNER    Type 1 diabetes mellitus with diabetic chronic kidney disease, unspecified CKD stage (H)       * insulin glargine 100 UNIT/ML injection    LANTUS SOLOSTAR    1 mL    Inject 9 Units Subcutaneous At Bedtime    Type 1 diabetes mellitus with diabetic chronic kidney disease, unspecified CKD stage (H)       * LANTUS SOLOSTAR 100 UNIT/ML injection   Generic drug:  insulin glargine     6 mL    Inject 11 Units Subcutaneously At Bedtime    Type 1 diabetes mellitus with diabetic chronic kidney disease, unspecified CKD stage (H)       * insulin glargine U-300 300 UNIT/ML injection    TOUJEO    13.5 mL    Inject 9 Units Subcutaneous every morning    Type 1 diabetes mellitus with diabetic chronic kidney disease, unspecified CKD stage (H)       * insulin pen needle 29G X 12.7MM     400 each    1 Units 4 times daily (before meals and nightly). Please dispense Rachel Needles    Type 1 diabetes, HbA1c goal < 8% (H)       * insulin pen needle 32G X 4 MM    BD RACHEL U/F    400 each    Use four times daily.    Type 1 diabetes, HbA1c goal < 8% (H)       * BD RACHEL U/F 32G X 4 MM   Generic drug:  insulin pen needle     200 each    use 4 times daily    Type 1 diabetes mellitus with diabetic chronic kidney disease, unspecified CKD stage (H)       * BD RACHEL U/F 32G X 4 MM   Generic drug:  insulin pen needle     200 each    use 4 times daily    Type 1 diabetes mellitus with  diabetic chronic kidney disease, unspecified CKD stage (H)       * BD RACHEL U/F 32G X 4 MM   Generic drug:  insulin pen needle     200 each    use 4 times daily    Type 1 diabetes, HbA1c goal < 8% (H)       ipratropium 17 MCG/ACT Inhaler    ATROVENT HFA    12.9 Inhaler    Inhale 2 puffs into the lungs 4 times daily Pharmacy ok to hold prescription until due    COPD (chronic obstructive pulmonary disease) (H)       * levothyroxine 100 MCG tablet    SYNTHROID/LEVOTHROID    90 tablet    Take 1 tablet (100 mcg) by mouth daily For thyroid Pharmacy ok to hold prescription until due    Hypothyroidism, unspecified type       * levothyroxine 125 MCG tablet    SYNTHROID/LEVOTHROID    90 tablet    Take 1 tablet (125 mcg) by mouth daily This is a higher dosage for thyroid for energy/keep warmer    Hypothyroidism, unspecified type       [employer] FINEPOINT LANCETS Misc     100 each    Use to test blood sugars 4 times daily or as directed.  Brand per patient/insurance preference    Type 1 diabetes, HbA1c goal < 8% (H)       losartan 25 MG tablet    COZAAR    45 tablet    Take 0.5 tablets (12.5 mg) by mouth daily For blood pressure with breakfast    CKD (chronic kidney disease) stage 3, GFR 30-59 ml/min       metoprolol succinate 50 MG 24 hr tablet    TOPROL-XL    90 tablet    Take 1 tablet (50 mg) by mouth daily For blood pressure and lowers pulse. Pharmacy ok to hold prescription until due    Essential hypertension with goal blood pressure less than 140/90       * sertraline 25 MG tablet    ZOLOFT    90 tablet    Take 1 tablet (25 mg) by mouth daily for energy/depression and anxiety    Major depressive disorder, recurrent episode, mild (H)       * sertraline 50 MG tablet    ZOLOFT    90 tablet    Take 1 tablet (50 mg) by mouth daily For depression/anxiety (this is double dosage from before).    Major depressive disorder, recurrent episode, moderate (H)       simvastatin 20 MG tablet    ZOCOR    90 tablet    TAKE ONE TABLET BY  MOUTH AT BEDTIME for cholesterol    Hyperlipidemia LDL goal <100       triamcinolone 0.1 % cream    KENALOG    80 g    Apply sparingly to affected area two times daily for up 10 days.    Dermatitis       * Notice:  This list has 15 medication(s) that are the same as other medications prescribed for you. Read the directions carefully, and ask your doctor or other care provider to review them with you.

## 2018-01-19 NOTE — PROGRESS NOTES
Diabetes Self Management Training: Individual Review Visit    Claudia Presley presents today for education, evaluation of glucose control and modification of medication(s) related to Type 1 diabetes.    She is accompanied by self and spouse    Patient's diabetes management related comments/concerns:  feels she is taking too much insulin.  She is having too many low BG.      Patient's emotional response to diabetes: expresses readiness to learn and concern for health and well-being    Patient would like this visit to be focused around the following diabetes-related behaviors and goals: Healthy Eating, Being Active, Monitoring and Taking Medication    ASSESSMENT:  Patient Problem List and Family Medical History reviewed for relevant medical history, current medical status, and diabetes risk factors.    Current Diabetes Management per Patient:  Taking diabetes medications?   yes:     Diabetes Medication(s)     Diabetic Other Sig    glucagon (GLUCAGON EMERGENCY) 1 MG kit Inject 1 mg into the muscle once for 1 dose    Insulin Sig    LANTUS SOLOSTAR 100 UNIT/ML soln Inject 11 Units Subcutaneously At Bedtime    insulin glargine (LANTUS SOLOSTAR) 100 UNIT/ML injection Inject 9 Units Subcutaneous At Bedtime    HUMALOG KWIKPEN 100 UNIT/ML soln INJECT 13 UNITS UNDER THE SKIN BEFORE BREAKFAST, LUNCH, AND DINNER    HUMALOG KWIKPEN 100 UNIT/ML soln INJECT 13 UNITS UNDER THE SKIN BEFORE BREAKFAST, LUNCH, AND DINNER    insulin lispro (HUMALOG KWIKPEN) 100 UNIT/ML injection 11 units before breakfast, 11 units before lunch, 11 units before dinner 3 month supply please      , Injectable Medications: Lantus 0-0-0-9 and Humalog determined by patient and she has memory issures.    *Abbreviated insulin dose documentation key: Insulin Trade Name (dsgdchozo-vvyge-ltpgaj-bedtime) - i.e. Humalog 5-5-5-0 (Humalog 5 units at breakfast, 5 units at lunch, and 5 units at dinner).    Past Diabetes Education: Yes    Patient glucose self  "monitoring as follows: four times daily.   BG meter: One Touch Ultra meter  BG results: see blood glucose log attached to this encounter , scanned into chart.  From , she is all over the place.    BG values are: Not in goal  Patient's most recent   Lab Results   Component Value Date    A1C 7.2 10/23/2017    is meeting goal of <8.0    Nutrition:  Patient eats 3 meals per day    Breakfast - cold cereal, or hot cereal or pancakes or waffles.  Coffee milk  Lunch - tuna salad sandwich and chips and milk or peanut butter sandwich.    Dinner - hot dogs and mac and cheese.  Or chili crackers and lettuce salad.  Snacks - gladys crackers     Beverages: water, coffee, milk    Cultural/Christianity diet restrictions: No     Biggest Challenge to Healthy Eating: none and does have poor memory    Physical Activity:    Type: walks when she can with her     Diabetes Risk Factors:      Diabetes Complications:  Not discussed today.    Vitals:  There were no vitals taken for this visit.  Estimated body mass index is 22.85 kg/(m^2) as calculated from the following:    Height as of 1/4/18: 1.6 m (5' 3\").    Weight as of 1/4/18: 58.5 kg (129 lb).   Last 3 BP:   BP Readings from Last 3 Encounters:   01/04/18 99/67   10/23/17 135/69   07/10/17 102/67       History   Smoking Status     Former Smoker     Types: Cigarettes     Quit date: 1/1/1998   Smokeless Tobacco     Never Used     Comment: 1998       Labs:  Lab Results   Component Value Date    A1C 7.2 10/23/2017     Lab Results   Component Value Date     10/23/2017     Lab Results   Component Value Date    LDL 68 07/15/2016     HDL Cholesterol   Date Value Ref Range Status   07/15/2016 80 >49 mg/dL Final   ]  GFR Estimate   Date Value Ref Range Status   10/23/2017 37 (L) >60 mL/min/1.7m2 Final     Comment:     Non  GFR Calc     GFR Estimate If Black   Date Value Ref Range Status   10/23/2017 44 (L) >60 mL/min/1.7m2 Final     Comment:      " GFR Calc     Lab Results   Component Value Date    CR 1.38 10/23/2017     No results found for: MICROALBUMIN    Socio/Economic Considerations:    Support system: family and spouse/significant other    Health Beliefs and Attitudes:   Patient Activation Measure Survey Score:  ZE Score (Last Two) 3/6/2012   ZE Raw Score 43   Activation Score 68.5   ZE Level 4       Stage of Change: ACTION (Actively working towards change)      Diabetes knowledge and skills assessment:     Patient is knowledgeable in diabetes management concepts related to: Healthy Eating, Being Active, Monitoring, Taking Medication and Problem Solving    Patient needs further education on the following diabetes management concepts: Healthy Eating, Being Active, Monitoring, Taking Medication, Problem Solving, Reducing Risks and Healthy Coping    Barriers to Learning Assessment: Cognitive impairment    Based on learning assessment above, most appropriate setting for further diabetes education would be: Individual setting.    INTERVENTION:   Education provided today on:  AADE Self-Care Behaviors:  Healthy Eating: carbohydrate counting, consistency in amount, composition, and timing of food intake, weight reduction, heart healthy diet, eating out, portion control, plate planning method and label reading  Monitoring: purpose, proper technique, log and interpret results, individual blood glucose targets and frequency of monitoring  Taking Medication: action of prescribed medication, drawing up, administering and storing injectable diabetes medications, proper site selection and rotation for injections, side effects of prescribed medications and when to take medications  Placed her on the new Freestyle Cesar    Opportunities for ongoing education and support in diabetes-self management were discussed.    Pt verbalized understanding of concepts discussed and recommendations provided today.       Education Materials Provided:  Toujeo Insulin information and  Cesar CGM meter kit    PLAN:  See Patient Instructions for co-developed, patient-stated behavior change goals.  AVS printed and provided to patient today.    FOLLOW-UP:  Follow-up appointment scheduled on Feb. 9.  Chart routed to referring provider.    Ongoing plan for education and support: Follow-up visit with diabetes educator in [place]    Kaelyn Ramirez RN/[facility] Diabetes Educator    Time Spent: 60 minutes  Encounter Type: Individual    Any diabetes medication dose changes were made via the CDE Protocol and Collaborative Practice Agreement with the patient's primary care provider. A copy of this encounter was shared with the provider.

## 2018-01-19 NOTE — PATIENT INSTRUCTIONS
My Diabetes Care Goals:  Eating:  May have some cookies, gladys crackers and peanut.     Monitoring: using the Cesar continuous glucose monitor.  The sensors need to be replaced every 10 days.  When you go to the pharmacy,  the device (scanner) and 3 sensors.     When BG is low, take oj and rescan in 15 minutes then eat and take insulin.    Taking Medication: Toujeo U300 give 9 units when she wakes up  Humalog: take 11 units with breakfast and 11 units with lunch and 11 units with dinner.      Follow up:  Follow-up diabetes education appointment scheduled on Friday Feb. 9 @ 11:30.      Bring blood glucose meter and logbook with you to all doctor and follow-up appointments.     [facility] for the [facility]:  For Your Diabetes Education and Nutrition Appointments Call:  [phone]   For Diabetes Education or Nutrition Related Questions:   Phone: [phone]  E-mail: [email or web address]  Fax: [phone]   If you need a medication refill please contact your pharmacy. Please allow 3 business days for your refills to be completed.    Instructions for emailing the Diabetes Educators    If you need to communicate a non-urgent message to a Diabetes Educator via email, please send to [email or web address].    Please follow the following email guidelines:    Subject line: Secure: your clinic name (example: Secure: [facility])  In the email please include: First name, middle initial, last name and date of birth.    We will be in touch with you within one (1) business day.   Hi everyone,    As you all know, Freestyle Cesar is now available for Medicare patients. Most patients should expect to pay $0 or close to it after meeting their deductible. Some patients may have to pay up to $50. The first prescription will cost more (up to $100), since they will need to purchase the reader as well.     If you have Medicare patients interested in Freestyle Cesar, the first  step is to reach out to the DME of your choice to get the process started. The DME will check the patient's eligibility and generate the necessary paperwork.    The 6 DMEs that you can use for Freestyle Cesar are [facility], [facility], [facility], and [facility].

## 2018-01-20 DIAGNOSIS — E10.22 TYPE 1 DIABETES MELLITUS WITH DIABETIC CHRONIC KIDNEY DISEASE, UNSPECIFIED CKD STAGE (H): ICD-10-CM

## 2018-01-22 ENCOUNTER — TELEPHONE (OUTPATIENT)
Dept: EDUCATION SERVICES | Facility: CLINIC | Age: 83
End: 2018-01-22

## 2018-01-22 NOTE — TELEPHONE ENCOUNTER
Patient Claudia saw CDE on 1/19 for diabetes care.     , Nando, called to say that they spoke to the pharmacist, Justyn, at [facility] who told them that [facility] would not cover a Freestyle Cesar sensor.     CDE told them it would be covered. Nando would like to know if CDE can call him with the name of the person at [facility] who she spoke to.    Please call him at [phone]    Yeny Siegel RD, LD, CDE

## 2018-01-23 ENCOUNTER — TELEPHONE (OUTPATIENT)
Dept: EDUCATION SERVICES | Facility: CLINIC | Age: 83
End: 2018-01-23

## 2018-01-23 DIAGNOSIS — E11.9 DIABETES MELLITUS, TYPE 2 (H): Primary | ICD-10-CM

## 2018-01-23 RX ORDER — INSULIN LISPRO 100 [IU]/ML
INJECTION, SOLUTION INTRAVENOUS; SUBCUTANEOUS
Qty: 15 ML | Refills: 2 | Status: SHIPPED | OUTPATIENT
Start: 2018-01-23 | End: 2018-01-25

## 2018-01-23 NOTE — TELEPHONE ENCOUNTER
Called Nando to explain the way Freestyle Cesar is to work.  We wrote a DME RX for the device and sensors and signed by Dr. Finney, the will fax off to with [facility] or [facility] for mailorder as the billing is under DME for this patients insurance plan.    Kaelyn Ramirez RN/YNESE  [facility] Diabetes Educator

## 2018-01-25 ENCOUNTER — TELEPHONE (OUTPATIENT)
Dept: FAMILY MEDICINE | Facility: CLINIC | Age: 83
End: 2018-01-25

## 2018-01-25 DIAGNOSIS — E10.22 TYPE 1 DIABETES MELLITUS WITH DIABETIC CHRONIC KIDNEY DISEASE, UNSPECIFIED CKD STAGE (H): ICD-10-CM

## 2018-01-25 NOTE — TELEPHONE ENCOUNTER
Representative from [facility] called me directly with pt  on line.  Pt just picked up Humalog and since it was a 33 day supply they were charged a two month copay.  Pt  clarified pt takes 11 units tid.  This turns out to be 990 ml a month.  The kwikpens only come in 3 ml pens.  Pt would need 4 for one month supply, 7 for two month supply, or 10 for a 90 day supply.    They would like prescription changed to have less copays.      [facility] worker states we can either change prescription to exact 1, 2, or 3 months supply or do a tier exception to change medication tier is on to lower tier.      Resent prescription as 90 day refill.  Pharmacy notified to d/c previous prescription for 13 units tid.  Philomena Collins RN

## 2018-01-25 NOTE — TELEPHONE ENCOUNTER
Pt  called back and I advised him of plan.  Advised to call pharmacy a few days before due and if there is a problem let me know.  Philomena Collins RN

## 2018-01-30 ENCOUNTER — TELEPHONE (OUTPATIENT)
Dept: EDUCATION SERVICES | Facility: CLINIC | Age: 83
End: 2018-01-30

## 2018-01-30 DIAGNOSIS — E10.22 TYPE 1 DIABETES MELLITUS WITH DIABETIC CHRONIC KIDNEY DISEASE, UNSPECIFIED CKD STAGE (H): Primary | ICD-10-CM

## 2018-01-30 NOTE — TELEPHONE ENCOUNTER
Called patient back and explained increasing Toujeo dose to 12 units.  Patient confirmed that she would make a PCP appointment if she felt sick, fever, chills, nauseous, or pain/burning with urination.  Educated patient that sometimes underlying illness causes blood sugars to rise, she was surprised to learn this.  Informed patient that she could also try a new pen of Humalog to further rule out if there is something wrong with the insulin.    Patient verbalized understanding.    Qian Mathur MS, RD, LD, CDE

## 2018-01-30 NOTE — TELEPHONE ENCOUNTER
Ok lets bump up turjeo to 12units (long acting insulin) and follow-up md appointment if worse or sick - fevers or chills/urine changes/etc.. David Finney MD

## 2018-01-30 NOTE — TELEPHONE ENCOUNTER
Talked with Cathy, not aware Nando called diabetes education concerned about blood sugars.    Yesterday- took Toujeo 9-0-0-0 for the past 3 days and Humalog 11-11-11-0    Today-   8:48am-Took the Humalog 11units  and Toujeo 10 units ate breakfast (bowl cereal and 1/2 glass orange juice)  12:30pm-  and 403 before lunch (peanut butter sandwich, dill pickle, and 1 glass milk).  Took 11 units Humalog  2pm- 329.    Humalog pen- patient thinks pen is new- has lots of units left    Patient states she washes hands prior to checking.    CDE Assessment:  Per chart review current insulin doses are are typical for patient.  Per patient humalog pen is new.  Patient may be ill or insulin needs have increased since out of the hospital.  Due to documented memory issues and social factors would recommend further assessment from provider who is familiar with her insulin history.  Could consider increasing Humalog mealtime dosing and/or adding a correction scale as noted below to allow for correction for highs.     Routing to PCP for other reasons blood sugars could be high.    Qian Mathur MS, RD, LD, CDE      If blood glucose is: Add extra Humalog/Novolog   150-200 1 unit   201-250 2 units   251-300 3 units   301-350 4 units   351-400 5 units   401-450 6 units   451-500 7 units

## 2018-01-30 NOTE — TELEPHONE ENCOUNTER
Pts spouse, Nando, called in and left message. Before bed last night GB was 499, took no insulin    Today ,  and 403 reported from 2 different meters.    Pt took 10u of Toujeo and 11u of Humalog.    Mentioned 5 pens cost $390.

## 2018-02-03 DIAGNOSIS — E10.9 TYPE 1 DIABETES, HBA1C GOAL < 8% (H): ICD-10-CM

## 2018-02-06 RX ORDER — PEN NEEDLE, DIABETIC 32GX 5/32"
NEEDLE, DISPOSABLE MISCELLANEOUS
Qty: 200 EACH | Refills: 0 | Status: SHIPPED | OUTPATIENT
Start: 2018-02-06 | End: 2018-03-26

## 2018-02-06 NOTE — TELEPHONE ENCOUNTER
Prescription approved per [facility] Refill Protocol.  Lisandra Alcaraz RN

## 2018-02-09 ENCOUNTER — ALLIED HEALTH/NURSE VISIT (OUTPATIENT)
Dept: EDUCATION SERVICES | Facility: CLINIC | Age: 83
End: 2018-02-09
Payer: COMMERCIAL

## 2018-02-09 ENCOUNTER — MEDICAL CORRESPONDENCE (OUTPATIENT)
Dept: HEALTH INFORMATION MANAGEMENT | Facility: CLINIC | Age: 83
End: 2018-02-09

## 2018-02-09 DIAGNOSIS — E10.22 TYPE 1 DIABETES MELLITUS WITH DIABETIC CHRONIC KIDNEY DISEASE, UNSPECIFIED CKD STAGE (H): Primary | ICD-10-CM

## 2018-02-09 PROCEDURE — G0108 DIAB MANAGE TRN  PER INDIV: HCPCS

## 2018-02-09 PROCEDURE — 99207 ZZC DROP WITH A PROCEDURE: CPT

## 2018-02-09 NOTE — MR AVS SNAPSHOT
After Visit Summary   2/9/2018    Claudia Presley    MRN: [id number]           Patient Information     Date Of Birth          1935        Visit Information        Provider Department      2/9/2018 11:30 AM AN DIABETIC ED RESOURCE [facility]        Care Instructions    My Diabetes Care Goals:    Healthy Eating: Have an egg with breakfast.  Or cheese or meat .  Protein with all meals   Use Peanut butter more often with meals and snacks.    Taking Medication: Toujeo U300 take 12 units each day  Humalog 12 units with breakfast and 10 units with lunch and 11 units with dinner.    Follow up:  Will call you with information I receive to help with costs.  Follow up with me Tuesday April 17 @ 11:30       Bring blood glucose meter and logbook with you to all doctor and follow-up appointments.     [facility] for the [facility]:  For Your Diabetes Education and Nutrition Appointments Call:  [phone]   For Diabetes Education or Nutrition Related Questions:   Phone: [phone]  E-mail: [email or web address]  Fax: [phone]   If you need a medication refill please contact your pharmacy. Please allow 3 business days for your refills to be completed.    Instructions for emailing the Diabetes Educators    If you need to communicate a non-urgent message to a Diabetes Educator via email, please send to [email or web address].    Please follow the following email guidelines:    Subject line: Secure: your clinic name (example: Secure: [facility])  In the email please include: First name, middle initial, last name and date of birth.    We will be in touch with you within one (1) business day.             Follow-ups after your visit        Your next 10 appointments already scheduled     Apr 17, 2018 11:30 AM CDT   Diabetic Education with AN DIABETIC ED RESOURCE   [facility] ([facility])    [id number]  [address] [facility] [id number] [phone]              Who to contact     If you have questions or need follow up information about today's clinic visit or your schedule please contact [facility] [place] directly at [phone].  Normal or non-critical lab and imaging results will be communicated to you by MyChart, letter or phone within 4 business days after the clinic has received the results. If you do not hear from us within 7 days, please contact the clinic through MyChart or phone. If you have a critical or abnormal lab result, we will notify you by phone as soon as possible.  Submit refill requests through [facility] or call your pharmacy and they will forward the refill request to us. Please allow 3 business days for your refill to be completed.          Additional Information About Your Visit        MyChart Information     [facility] lets you send messages to your doctor, view your test results, renew your prescriptions, schedule appointments and more. To sign up, go to [email or web address] . Click on \"Log in\" on the left side of the screen, which will take you to the Welcome page. Then click on \"Sign up Now\" on the right side of the page.     You will be asked to enter the access code listed below, as well as some personal information. Please follow the directions to create your username and password.     Your access code is: [email or web address]  Expires: 5/10/2018 12:51 PM     Your access code will  in 90 days. If you need help or a new code, please call your [facility] clinic or [phone].        Care EveryWhere ID     This is your Care EveryWhere ID. This could be used by other organizations to access your [facility] medical records  [id number]         Blood Pressure from Last 3 Encounters:   18 99/67   10/23/17 135/69   07/10/17 102/67    Weight from Last 3 Encounters:   18 58.5 kg (129 lb)   10/23/17 61.2 kg (135 lb)   07/10/17 59.9 kg (132 lb)              Today, " you had the following     No orders found for display       Primary Care Provider Office Phone # Fax #    David Finney [phone] [address] [facility] [address]        Equal Access to Services     KIRAN SHERIFF : Ny salcedo ku serenao Socasaali, waaxda luqadaha, qaybta kaalmada adeegyada, micky galindo laSharynchelsea barrera. So [facility] [phone].    ATENCIÓN: Si habla español, tiene a ehrndon disposición servicios gratuitos de asistencia lingüística. Llame al [phone].    We comply with applicable federal civil rights laws and [place] laws. We do not discriminate on the basis of race, color, national origin, age, disability, sex, sexual orientation, or gender identity.            Thank you!     Thank you for choosing [facility]  for your care. Our goal is always to provide you with excellent care. Hearing back from our patients is one way we can continue to improve our services. Please take a few minutes to complete the written survey that you may receive in the mail after your visit with us. Thank you!             Your Updated Medication List - Protect others around you: Learn how to safely use, store and throw away your medicines at www.disposemymeds.org.          This list is accurate as of 2/9/18 12:51 PM.  Always use your most recent med list.                   Brand Name Dispense Instructions for use Diagnosis    amLODIPine 2.5 MG tablet    NORVASC    180 tablet    TAKE 2 TABLETS BY MOUTH DAILY    Essential hypertension with goal blood pressure less than 140/90       aspirin 81 MG tablet      1 TABLET DAILY        blood glucose lancets standard    no brand specified    100 each    Use to test blood sugar 4 times daily or as directed. Dispense One Touch Ultra or per patient's insurance    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)       blood glucose monitoring meter device kit    no brand specified    1 kit    Use to test  blood sugar 4 times daily or as directed. Dispense One Touch Ultra or per patient's insurance    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)       blood glucose monitoring test strip    no brand specified    3 Box    In Vitro route 4 times daily. Dispense One Touch Ultra test strips or per patient/insurance preference    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)       budesonide-formoterol 160-4.5 MCG/ACT Inhaler    SYMBICORT    3 Inhaler    Inhale 1 puff into the lungs 2 times daily Daily inhaler for copd Pharmacy ok to hold prescription until due    Chronic bronchitis, unspecified chronic bronchitis type (H)       cetirizine 10 MG tablet    zyrTEC    90 tablet    TAKE ONE TABLET BY MOUTH IN THE EVENING AS NEEDED FOR ITCHING / RASH    Rash       cloNIDine 0.1 MG tablet    CATAPRES    180 tablet    TAKE 1 TABLET BY MOUTH 2 TIMES DAILY FOR BLOOD PRESSURE    Essential hypertension with goal blood pressure less than 140/90       continuous blood glucose monitoring sensor     3 each    For use with Freestyle Cesar Flash  for continuous monitioring of blood glucose levels. Replace sensor every 10 days.    Type 1 diabetes mellitus with diabetic chronic kidney disease, unspecified CKD stage (H)       doxycycline 100 MG capsule    VIBRAMYCIN    20 capsule    TAKE ONE CAPSULE BY MOUTH TWICE DAILY with food    Cellulitis of leg, right       fluocinonide 0.05 % cream    LIDEX    120 g    Apply sparingly to affected area twice daily for up to 14 days.  Do not apply to face.    Dermatitis       FREESTYLE CESAR READER Gale     1 Device    1 Device continuous    Type 1 diabetes mellitus with diabetic chronic kidney disease, unspecified CKD stage (H)       glucagon 1 MG kit    GLUCAGON EMERGENCY    1 mg    Inject 1 mg into the muscle once for 1 dose    Type 1 diabetes mellitus with diabetic chronic kidney disease, unspecified CKD stage (H)       * HumaLOG KWIKpen 100 UNIT/ML  injection   Generic drug:  insulin lispro     1 mL    11 units before breakfast, 11 units before lunch, 11 units before dinner 3 month supply please    Type 1 diabetes mellitus with diabetic chronic kidney disease, unspecified CKD stage (H)       * HumaLOG KWIKpen 100 UNIT/ML injection   Generic drug:  insulin lispro     15 mL    INJECT 13 UNITS UNDER THE SKIN BEFORE BREAKFAST, LUNCH, AND DINNER    Type 1 diabetes mellitus with diabetic chronic kidney disease, unspecified CKD stage (H)       * insulin lispro 100 UNIT/ML injection    HumaLOG KWIKpen    30 mL    INJECT 11 UNITS UNDER THE SKIN BEFORE BREAKFAST, LUNCH, AND DINNER    Type 1 diabetes mellitus with diabetic chronic kidney disease, unspecified CKD stage (H)       * insulin glargine 100 UNIT/ML injection    LANTUS SOLOSTAR    1 mL    Inject 9 Units Subcutaneous At Bedtime    Type 1 diabetes mellitus with diabetic chronic kidney disease, unspecified CKD stage (H)       * LANTUS SOLOSTAR 100 UNIT/ML injection   Generic drug:  insulin glargine     6 mL    Inject 11 Units Subcutaneously At Bedtime    Type 1 diabetes mellitus with diabetic chronic kidney disease, unspecified CKD stage (H)       * insulin glargine U-300 300 UNIT/ML injection    TOUJEO    13.5 mL    Inject 9 Units Subcutaneous every morning    Type 1 diabetes mellitus with diabetic chronic kidney disease, unspecified CKD stage (H)       * insulin pen needle 29G X 12.7MM     400 each    1 Units 4 times daily (before meals and nightly). Please dispense [facility] Needles    Type 1 diabetes, HbA1c goal < 8% (H)       * insulin pen needle 32G X 4 MM    BD RACHEL U/F    400 each    Use four times daily.    Type 1 diabetes, HbA1c goal < 8% (H)       * BD RACHEL U/F 32G X 4 MM   Generic drug:  insulin pen needle     200 each    use 4 times daily    Type 1 diabetes mellitus with diabetic chronic kidney disease, unspecified CKD stage (H)       * BD RACHEL U/F 32G X 4 MM   Generic drug:  insulin pen needle     200 each     use 4 times daily    Type 1 diabetes mellitus with diabetic chronic kidney disease, unspecified CKD stage (H)       * BD RACHEL U/F 32G X 4 MM   Generic drug:  insulin pen needle     200 each    use 4 times daily    Type 1 diabetes, HbA1c goal < 8% (H)       ipratropium 17 MCG/ACT Inhaler    ATROVENT HFA    12.9 Inhaler    Inhale 2 puffs into the lungs 4 times daily Pharmacy ok to hold prescription until due    COPD (chronic obstructive pulmonary disease) (H)       * levothyroxine 100 MCG tablet    SYNTHROID/LEVOTHROID    90 tablet    Take 1 tablet (100 mcg) by mouth daily For thyroid Pharmacy ok to hold prescription until due    Hypothyroidism, unspecified type       * levothyroxine 125 MCG tablet    SYNTHROID/LEVOTHROID    90 tablet    Take 1 tablet (125 mcg) by mouth daily This is a higher dosage for thyroid for energy/keep warmer    Hypothyroidism, unspecified type       LendLayerCAN FINEPOINT LANCETS Misc     100 each    Use to test blood sugars 4 times daily or as directed.  Brand per patient/insurance preference    Type 1 diabetes, HbA1c goal < 8% (H)       losartan 25 MG tablet    COZAAR    45 tablet    Take 0.5 tablets (12.5 mg) by mouth daily For blood pressure with breakfast    CKD (chronic kidney disease) stage 3, GFR 30-59 ml/min       metoprolol succinate 50 MG 24 hr tablet    TOPROL-XL    90 tablet    Take 1 tablet (50 mg) by mouth daily For blood pressure and lowers pulse. Pharmacy ok to hold prescription until due    Essential hypertension with goal blood pressure less than 140/90       order for DME     3 Units    Equipment being ordered: Freestyle Cesar CGM (continuous glucose monitoring system) Will need 1 reader device and 3 sensor discs.  This is for one month supply. When refills she will need 3 sensor discs each month, or 9 sensors for 3 months supply    Diabetes mellitus, type 2 (H)       * sertraline 25 MG tablet    ZOLOFT    90 tablet    Take 1 tablet (25 mg) by mouth daily for  energy/depression and anxiety    Major depressive disorder, recurrent episode, mild (H)       * sertraline 50 MG tablet    ZOLOFT    90 tablet    Take 1 tablet (50 mg) by mouth daily For depression/anxiety (this is double dosage from before).    Major depressive disorder, recurrent episode, moderate (H)       simvastatin 20 MG tablet    ZOCOR    90 tablet    TAKE ONE TABLET BY MOUTH AT BEDTIME for cholesterol    Hyperlipidemia LDL goal <100       triamcinolone 0.1 % cream    KENALOG    80 g    Apply sparingly to affected area two times daily for up 10 days.    Dermatitis       * Notice:  This list has 15 medication(s) that are the same as other medications prescribed for you. Read the directions carefully, and ask your doctor or other care provider to review them with you.

## 2018-02-09 NOTE — PROGRESS NOTES
Diabetes Self Management Training: Follow-up Visit    Claudia Presley presents today for education, evaluation of glucose control and modification of medication(s) related to Type 1 diabetes.    She is accompanied by self and spouse    Patient's diabetes management related comments/concerns: wife has some form of dimentia and short term memory issues, her  Nando is the one to be discussing changes and issues with    Patient would like this visit to be focused around the following diabetes-related behaviors and goals: Healthy Eating, Being Active, Monitoring and Taking Medication. Patient will drink juice and take candy and cookies and not remember she has done this.  This case is difficult to manage due to her memory loss    ASSESSMENT:  Patient Problem List reviewed for relevant medical history and current medical status.    Current Diabetes Management per Patient:  Taking diabetes medications?   yes:     Diabetes Medication(s)     Diabetic Other Sig    glucagon (GLUCAGON EMERGENCY) 1 MG kit Inject 1 mg into the muscle once for 1 dose    Insulin Sig    insulin lispro (HUMALOG KWIKPEN) 100 UNIT/ML injection INJECT 11 UNITS UNDER THE SKIN BEFORE BREAKFAST, LUNCH, AND DINNER    insulin glargine U-300 (TOUJEO) 300 UNIT/ML injection Inject 9 Units Subcutaneous every morning    LANTUS SOLOSTAR 100 UNIT/ML soln Inject 11 Units Subcutaneously At Bedtime    insulin glargine (LANTUS SOLOSTAR) 100 UNIT/ML injection Inject 9 Units Subcutaneous At Bedtime    HUMALOG KWIKPEN 100 UNIT/ML soln INJECT 13 UNITS UNDER THE SKIN BEFORE BREAKFAST, LUNCH, AND DINNER    insulin lispro (HUMALOG KWIKPEN) 100 UNIT/ML injection 11 units before breakfast, 11 units before lunch, 11 units before dinner 3 month supply please      , Injectable Medications: Toujeo 12-0-0-0 and Humalog 11-11-11-0    *Abbreviated insulin dose documentation key: Insulin Trade Name ([id number]-[id number]) - i.e. Humalog 5-5-5-0 (Humalog 5 units at  "breakfast, 5 units at lunch, and 5 units at dinner).    Patient glucose self monitoring as follows: four times daily.   BG meter: Accu-chek Maryjane meter  BG results: see blood glucose log attached to this encounter , she is 346 here in clinic after breakfast.      BG values are: Not in goal  Patient's most recent   Lab Results   Component Value Date    A1C 7.2 10/23/2017    is meeting goal of <8.0    Nutrition:  Patient eats 3 meals per day    Breakfast - pancakes and SF syrup, coffee  Lunch - dill pickle, glass of milk and sandwich   Dinner - tomato noodle hotdish, milk   Snacks - gladys crackers, she had some cookies, and juice when she is low    Beverages: water and juice    Cultural/Amish diet restrictions: No     Biggest Challenge to Healthy Eating: her loss of memory    Physical Activity:    Type: around the house     Diabetes Complications:  Not discussed today.    Vitals:  There were no vitals taken for this visit.  Estimated body mass index is 22.85 kg/(m^2) as calculated from the following:    Height as of 1/4/18: 1.6 m (5' 3\").    Weight as of 1/4/18: 58.5 kg (129 lb).   Last 3 BP:   BP Readings from Last 3 Encounters:   01/04/18 99/67   10/23/17 135/69   07/10/17 102/67       History   Smoking Status     Former Smoker     Types: Cigarettes     Quit date: 1/1/1998   Smokeless Tobacco     Never Used     Comment: 1998       Labs:  Lab Results   Component Value Date    A1C 7.2 10/23/2017     Lab Results   Component Value Date     10/23/2017     Lab Results   Component Value Date    LDL 68 07/15/2016     HDL Cholesterol   Date Value Ref Range Status   07/15/2016 80 >49 mg/dL Final   ]  GFR Estimate   Date Value Ref Range Status   10/23/2017 37 (L) >60 mL/min/1.7m2 Final     Comment:     Non  GFR Calc     GFR Estimate If Black   Date Value Ref Range Status   10/23/2017 44 (L) >60 mL/min/1.7m2 Final     Comment:      GFR Calc     Lab Results   Component Value Date    CR " 1.38 10/23/2017     No results found for: MICROALBUMIN    Health Beliefs and Attitudes:   Patient Activation Measure Survey Score:  ZE Score (Last Two) 3/6/2012   ZE Raw Score 43   Activation Score 68.5   ZE Level 4       Stage of Change: MAINTENANCE (Working to maintain change, with risk of relapse)    Progress toward meeting diabetes-related behavioral goals:      Diabetes knowledge and skills assessment:     Patient is knowledgeable in diabetes management concepts related to: Healthy Eating, Being Active, Monitoring and Taking Medication    Patient needs further education on the following diabetes management concepts: Healthy Eating, Being Active, Monitoring, Taking Medication, Problem Solving and Reducing Risks    Barriers to Learning Assessment: Cognitive impairment    Based on learning assessment above, most appropriate setting for further diabetes education would be: Individual setting.    INTERVENTION:    Education provided today on:  AADE Self-Care Behaviors:  Taking Medication: action of prescribed medication, drawing up, administering and storing injectable diabetes medications, proper site selection and rotation for injections, side effects of prescribed medications and when to take medications  Problem Solving: high blood glucose - causes, signs/symptoms, treatment and prevention, low blood glucose - causes, signs/symptoms, treatment and prevention, carrying a carbohydrate source at all times and when to call health care provider  Reducing Risks: major complications of diabetes    Opportunities for ongoing education and support in diabetes-self management were discussed.    Pt verbalized understanding of concepts discussed and recommendations provided today.       Education Materials Provided:  No new materials provided today    PLAN:  See Patient Instructions for co-developed, patient-stated behavior change goals.  AVS printed and provided to patient today.    FOLLOW-UP:  Follow-up appointment  scheduled on April 17.  Chart routed to referring provider.    Ongoing plan for education and support: Follow-up visit with diabetes educator in [place], Follow-up with primary care provider and care coordinator referral.    Kaelyn Ramirez RN/[facility] Diabetes Educator    Time Spent: 60 minutes  Encounter Type: Individual    Any diabetes medication dose changes were made via the CDE Protocol and Collaborative Practice Agreement with the patient's primary care provider. A copy of this encounter was shared with the provider.

## 2018-02-12 ENCOUNTER — CARE COORDINATION (OUTPATIENT)
Dept: CARE COORDINATION | Facility: CLINIC | Age: 83
End: 2018-02-12

## 2018-02-14 NOTE — PROGRESS NOTES
Prescriptions Assistance Program is going to work with pt on obtaining assistance from drug companies. Made referral to MTM to explore alternative insulin options/pharmacy. Updated pt and pt's spouse. KEILY CC to follow-up in 4 weeks.    EVAN Eller  [facility]   [phone]  [email or web address]

## 2018-02-16 ENCOUNTER — ALLIED HEALTH/NURSE VISIT (OUTPATIENT)
Dept: PHARMACY | Facility: CLINIC | Age: 83
End: 2018-02-16
Payer: COMMERCIAL

## 2018-02-16 DIAGNOSIS — E03.9 HYPOTHYROIDISM, UNSPECIFIED TYPE: ICD-10-CM

## 2018-02-16 DIAGNOSIS — E10.22 TYPE 1 DIABETES MELLITUS WITH DIABETIC CHRONIC KIDNEY DISEASE, UNSPECIFIED CKD STAGE (H): ICD-10-CM

## 2018-02-16 DIAGNOSIS — I10 HYPERTENSION GOAL BP (BLOOD PRESSURE) < 140/90: ICD-10-CM

## 2018-02-16 DIAGNOSIS — R41.3 MEMORY LOSS: ICD-10-CM

## 2018-02-16 DIAGNOSIS — J42 CHRONIC BRONCHITIS, UNSPECIFIED CHRONIC BRONCHITIS TYPE (H): ICD-10-CM

## 2018-02-16 DIAGNOSIS — F33.1 MAJOR DEPRESSIVE DISORDER, RECURRENT EPISODE, MODERATE (H): Primary | ICD-10-CM

## 2018-02-16 DIAGNOSIS — E78.5 HYPERLIPIDEMIA LDL GOAL <100: ICD-10-CM

## 2018-02-16 PROCEDURE — 99607 MTMS BY PHARM ADDL 15 MIN: CPT | Performed by: PHARMACIST

## 2018-02-16 PROCEDURE — 99605 MTMS BY PHARM NP 15 MIN: CPT | Performed by: PHARMACIST

## 2018-02-16 RX ORDER — LANOLIN ALCOHOL/MO/W.PET/CERES
1000 CREAM (GRAM) TOPICAL DAILY
COMMUNITY

## 2018-02-16 RX ORDER — ALBUTEROL SULFATE 90 UG/1
2 AEROSOL, METERED RESPIRATORY (INHALATION) EVERY 6 HOURS
COMMUNITY

## 2018-02-16 NOTE — MR AVS SNAPSHOT
"              After Visit Summary   2/16/2018    Claudia Presley    MRN: [id number]           Patient Information     Date Of Birth          1935        Visit Information        Provider Department      [facility]2018 [facility]        Today's Diagnoses     Major depressive disorder, recurrent episode, moderate (H)    -  1    Hypothyroidism, unspecified type        Type 1 diabetes mellitus with diabetic chronic kidney disease, unspecified CKD stage (H)        Hyperlipidemia LDL goal <100        Chronic bronchitis, unspecified chronic bronchitis type (H)        Hypertension goal BP (blood pressure) < 140/90        Memory loss          Care Instructions       Recommendations from today's MTM visit:                                                      1. Monitor and record your daily blood pressure readings. We will review these on the phone during our next appointment.     2. Get your thyroid labs drawn next time you are in clinic.     3. Symbicort is the controller inhaler for COPD. If you are short of breath, this will not improve it. It works by improving your breathing over a long period of time. If you are short of breath, use your Ventolin inhaler.  The Ventolin is your \"emergency\" inhaler to be used when you are short of breath.     4. Start taking your Symbicort inhaler 1 puff twice daily. Rinse your mouth after using this inhaler to reduce your risk for a thrush infection. Symbicort will improve your breathing overall.     Next MTM visit: 3/9/18 at 12:30pm over the phone    To schedule another MT appointment, please call the clinic directly or you may call the MTM scheduling line at [phone] or toll-free at [phone].     My Clinical Pharmacist's contact information:                                                      It was a pleasure seeing you today!  Please feel free to contact me with any questions or concerns you have.    " "  Jamie Cunningham, PharmCOLEEN  MTM Pharmacist    Phone: [phone]     You may receive a survey about the MTM services you received.  I would appreciate your feedback to help me serve you better in the future. Please fill it out and return it when you can. Your comments will be anonymous.            Follow-ups after your visit        Your next 10 appointments already scheduled     Mar 09, 2018 12:30 PM CST   TELEMEDICINE with Jamie Cunningham RPH   [facility] Therapy [facility] ([facility])    [address]  [address]  [place] [address] [phone]           Note: this is not an onsite visit; there is no need to come to the facility.            Apr 17, 2018 11:30 AM CDT   Diabetic Education with AN DIABETIC ED RESOURCE   [facility] ([facility])    [address] [address] [phone]              Who to contact     If you have questions or need follow up information about today's clinic visit or your schedule please contact [facility] MEDICATION THERAPY MANAGEMENT directly at [phone].  Normal or non-critical lab and imaging results will be communicated to you by Inteligisticshart, letter or phone within 4 business days after the clinic has received the results. If you do not hear from us within 7 days, please contact the clinic through Inteligisticshart or phone. If you have a critical or abnormal lab result, we will notify you by phone as soon as possible.  Submit refill requests through MindEdge or call your pharmacy and they will forward the refill request to us. Please allow 3 business days for your refill to be completed.          Additional Information About Your Visit        MindEdge Information     MindEdge lets you send messages to your doctor, view your test results, renew your prescriptions, schedule appointments and more. To sign up, go to [email or web address] . Click on \"Log in\" on the left side of the " "screen, which will take you to the Welcome page. Then click on \"Sign up Now\" on the right side of the page.     You will be asked to enter the access code listed below, as well as some personal information. Please follow the directions to create your username and password.     Your access code is: [email or web address]  Expires: 5/10/2018 12:51 PM     Your access code will  in 90 days. If you need help or a new code, please call your [place] clinic or [phone].        Care EveryWhere ID     This is your Care EveryWhere ID. This could be used by other organizations to access your [place] medical records  [id number]         Blood Pressure from Last 3 Encounters:   18 99/67   10/23/17 135/69   07/10/17 102/67    Weight from Last 3 Encounters:   18 129 lb (58.5 kg)   10/23/17 135 lb (61.2 kg)   07/10/17 132 lb (59.9 kg)              We Performed the Following     TSH with free T4 reflex          Today's Medication Changes          These changes are accurate as of 18 11:59 PM.  If you have any questions, ask your nurse or doctor.               These medicines have changed or have updated prescriptions.        Dose/Directions    amLODIPine 2.5 MG tablet   Commonly known as:  NORVASC   This may have changed:    - how much to take  - how to take this  - when to take this  - additional instructions   Used for:  Essential hypertension with goal blood pressure less than 140/90        TAKE 2 TABLETS BY MOUTH DAILY   Quantity:  180 tablet   Refills:  1       HumaLOG KWIKpen 100 UNIT/ML injection   This may have changed:  Another medication with the same name was removed. Continue taking this medication, and follow the directions you see here.   Used for:  Type 1 diabetes mellitus with diabetic chronic kidney disease, unspecified CKD stage (H)   Generic drug:  insulin lispro        12 units before breakfast, 10 units before lunch, 11 units before dinner 3 month supply please   Quantity:  1 mL   Refills:  1    "    insulin glargine U-300 300 UNIT/ML injection   Commonly known as:  TOUJEO   This may have changed:    - how much to take  - Another medication with the same name was removed. Continue taking this medication, and follow the directions you see here.   Used for:  Type 1 diabetes mellitus with diabetic chronic kidney disease, unspecified CKD stage (H)        Dose:  9 Units   Inject 9 Units Subcutaneous every morning   Quantity:  13.5 mL   Refills:  3       * insulin pen needle 32G X 4 MM   Commonly known as:  BD RACHEL U/F   This may have changed:  Another medication with the same name was removed. Continue taking this medication, and follow the directions you see here.   Used for:  Type 1 diabetes, HbA1c goal < 8% (H)        Use four times daily.   Quantity:  400 each   Refills:  1       * BD RACHEL U/F 32G X 4 MM   This may have changed:  Another medication with the same name was removed. Continue taking this medication, and follow the directions you see here.   Used for:  Type 1 diabetes, HbA1c goal < 8% (H)   Generic drug:  insulin pen needle        use 4 times daily   Quantity:  200 each   Refills:  0       levothyroxine 125 MCG tablet   Commonly known as:  SYNTHROID/LEVOTHROID   This may have changed:  Another medication with the same name was removed. Continue taking this medication, and follow the directions you see here.   Used for:  Hypothyroidism, unspecified type        Dose:  125 mcg   Take 1 tablet (125 mcg) by mouth daily This is a higher dosage for thyroid for energy/keep warmer   Quantity:  90 tablet   Refills:  0       sertraline 50 MG tablet   Commonly known as:  ZOLOFT   This may have changed:  Another medication with the same name was removed. Continue taking this medication, and follow the directions you see here.   Used for:  Major depressive disorder, recurrent episode, moderate (H)        Dose:  50 mg   Take 1 tablet (50 mg) by mouth daily For depression/anxiety (this is double dosage from  before).   Quantity:  90 tablet   Refills:  1       * Notice:  This list has 2 medication(s) that are the same as other medications prescribed for you. Read the directions carefully, and ask your doctor or other care provider to review them with you.      Stop taking these medicines if you haven't already. Please contact your care team if you have questions.     cetirizine 10 MG tablet   Commonly known as:  zyrTEC           continuous blood glucose monitoring sensor           doxycycline 100 MG capsule   Commonly known as:  VIBRAMYCIN           fluocinonide 0.05 % cream   Commonly known as:  LIDEX           FREESTYLE WENDY READER [place]           LIFESCAN FINEPOINT LANCETS Misc                    Primary Care Provider Office Phone # Fax #    David Thomas Finney [phone] [address] [facility] [address]        Equal Access to Services     KIRAN SHERIFF : Ny lyonso Sotessy, waaxda luqadaha, qaybta kaalmada adeegyada, micky [place] . So [facility] [phone].    ATENCIÓN: Si habla español, tiene a herndon disposición servicios gratuitos de asistencia lingüística. Ll[facility] [phone].    We comply with applicable federal civil rights laws and [place] laws. We do not discriminate on the basis of race, color, national origin, age, disability, sex, sexual orientation, or gender identity.            Thank you!     Thank you for choosing [facility] MEDICATION THERAPY MANAGEMENT  for your care. Our goal is always to provide you with excellent care. Hearing back from our patients is one way we can continue to improve our services. Please take a few minutes to complete the written survey that you may receive in the mail after your visit with us. Thank you!             Your Updated Medication List - Protect others around you: Learn how to safely use, store and throw away your medicines at www.disposemymeds.org.          This list is accurate as of 2/16/18 11:59 PM.   Always use your most recent med list.                   Brand Name Dispense Instructions for use Diagnosis    albuterol 108 (90 BASE) MCG/ACT Inhaler    PROAIR HFA/PROVENTIL HFA/VENTOLIN HFA     Inhale 2 puffs into the lungs every 6 hours        amLODIPine 2.5 MG tablet    NORVASC    180 tablet    TAKE 2 TABLETS BY MOUTH DAILY    Essential hypertension with goal blood pressure less than 140/90       aspirin 81 MG tablet      1 TABLET DAILY        blood glucose lancets standard    no brand specified    100 each    Use to test blood sugar 4 times daily or as directed. 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 Dispense One Touch Ultra or per patient's insurance    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)       blood glucose monitoring test strip    no brand specified    3 Box    In Vitro route 4 times daily. Dispense One Touch Ultra test strips or per patient/insurance preference    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)       budesonide-formoterol 160-4.5 MCG/ACT Inhaler    SYMBICORT    3 Inhaler    Inhale 1 puff into the lungs 2 times daily Daily inhaler for copd Pharmacy ok to hold prescription until due    Chronic bronchitis, unspecified chronic bronchitis type (H)       calcium-vitamin D 500-125 MG-UNIT Tabs      Take 1 tablet by mouth daily        cloNIDine 0.1 MG tablet    CATAPRES    180 tablet    TAKE 1 TABLET BY MOUTH 2 TIMES DAILY FOR BLOOD PRESSURE    Essential hypertension with goal blood pressure less than 140/90       cyanocobalamin 1000 MCG tablet    vitamin  B-12     Take 1,000 mcg by mouth daily        FUROSEMIDE PO      Take 20 mg by mouth daily        glucagon 1 MG kit    GLUCAGON EMERGENCY    1 mg    Inject 1 mg into the muscle once for  1 dose    Type 1 diabetes mellitus with diabetic chronic kidney disease, unspecified CKD stage (H)       HumaLOG KWIKpen 100 UNIT/ML injection   Generic drug:  insulin lispro     1 mL    12 units before breakfast, 10 units before lunch, 11 units before dinner 3 month supply please    Type 1 diabetes mellitus with diabetic chronic kidney disease, unspecified CKD stage (H)       insulin glargine U-300 300 UNIT/ML injection    TOUJEO    13.5 mL    Inject 9 Units Subcutaneous every morning    Type 1 diabetes mellitus with diabetic chronic kidney disease, unspecified CKD stage (H)       * insulin pen needle 32G X 4 MM    BD RACHEL U/F    400 each    Use four times daily.    Type 1 diabetes, HbA1c goal < 8% (H)       * BD RACHEL U/F 32G X 4 MM   Generic drug:  insulin pen needle     200 each    use 4 times daily    Type 1 diabetes, HbA1c goal < 8% (H)       levothyroxine 125 MCG tablet    SYNTHROID/LEVOTHROID    90 tablet    Take 1 tablet (125 mcg) by mouth daily This is a higher dosage for thyroid for energy/keep warmer    Hypothyroidism, unspecified type       losartan 25 MG tablet    COZAAR    45 tablet    Take 0.5 tablets (12.5 mg) by mouth daily For blood pressure with breakfast    CKD (chronic kidney disease) stage 3, GFR 30-59 ml/min       metoprolol succinate 50 MG 24 hr tablet    TOPROL-XL    90 tablet    Take 1 tablet (50 mg) by mouth daily For blood pressure and lowers pulse. Pharmacy ok to hold prescription until due    Essential hypertension with goal blood pressure less than 140/90       order for DME     3 Units    Equipment being ordered: Freestyle Cesar CGM (continuous glucose monitoring system) Will need 1 reader device and 3 sensor discs.  This is for one month supply. When refills she will need 3 sensor discs each month, or 9 sensors for 3 months supply    Diabetes mellitus, type 2 (H)       sertraline 50 MG tablet    ZOLOFT    90 tablet    Take 1 tablet (50 mg) by mouth daily For depression/anxiety (this  is double dosage from before).    Major depressive disorder, recurrent episode, moderate (H)       simvastatin 20 MG tablet    ZOCOR    90 tablet    TAKE ONE TABLET BY MOUTH ONCE DAILY. AT BEDTIME FOR CHOLESTEROL    Hyperlipidemia LDL goal <100       * Notice:  This list has 2 medication(s) that are the same as other medications prescribed for you. Read the directions carefully, and ask your doctor or other care provider to review them with you.

## 2018-02-16 NOTE — PROGRESS NOTES
"SUBJECTIVE/OBJECTIVE:                           Claudia Presley is a 82 year old female called for an initial visit for Medication Therapy Management.  She was referred to me from Dr. Finney.     Chief Complaint: Pt thinks she is taking a lot of medications and would like to decrease the number of medications she takes. She states her parents did not take a lot of medications and still lived to be in their 80s. She repeated this >5 times throughout our visit and does not remember recent direction.     Allergies/ADRs: Reviewed in Epic  Tobacco: No tobacco use, was previous smoker but quit in 1998  Alcohol: not currently using  Caffeine: no caffeine- drinks decaf coffee  Activity: Moving around the house, goes for walks in the summer.   PMH: Reviewed in Epic    Medication Adherence/Access  The patient misses their medication 0 times per week.    Patient is responsible for his/her own medications. Uses a pill box that she sets up based off of the directions on the bottle. Per patient chart notes, patient has poor memory which presents as a challenge to managing her overall health.   Medication barriers: issues identified by patient - affording medications, especially insulin.   The patient fills general medications at [facility]  Has the patient been offered to fill at [place]? Yes- not close.    Diabetes:  Pt currently taking Toujeo 12 units daily in the morning, Humalog 12 U before breakfast, 10 U before lunch, 11 U before supper.  Pt is not experiencing side effects. Patient states that she always eats a meal after she injects her insulin. She also reported that she no longer takes Lantus in the evening (since she is taking Toujeo in the morning). Patient reports that it is better for her BG readings to be \"high\" verses \"low\".   SMBG: four times daily.   Ranges (patient reported):   Date FBG/ 2hours post Lunch/2hours post Dinner /2hours post   2/16 117     2/15 77 (orange juice, 134) 117 (10 U) 83   2/14 212 407 " (16 units) 104   2/13 105 197 130   2/12 132 198 (10 U) 336   2/11 167 200 (11 U) 76=>83 (After a cup of orange juice)     Patient is experiencing hypoglycemia. Frequency of hypoglycemia? 1-2 times per week. Symptoms of low blood sugar? Coma, patient states she will pass out upon low blood glucose levels.  Recent symptoms of high blood sugar? none  Eye exam: up to date  Foot exam: up to date  Microalbumin is not < 30 mg/g. Pt is taking an ACEi/ARB.  Aspirin: Taking 81mg daily and denies side effects  Diet/Exercise: Patient states she is up throughout the day at home and goes for walks during the summer, although is not exercising regularly because of the winter weather.     Hypertension/Edema: Current medications include Clonidine 0.1mg BID, Metoprolol ER 50mg daily, Furosemide 20mg daily, Amlodipine 2.5mg daily, Losartan 12.5mg daily. Patient does not self-monitor BP, although states she has a BP monitor at home. Patient reports the following medication side effects: none. Pt denies edema in the evening.     Depression/Memory Loss:  Current medications include: Sertraline 50mg once daily. This was recently increased in January from 25 mg daily. Pt reports that depression symptoms are improved.Patient cannot remember her last depression episode. Per chart patient had a recent suicide attempt, and took too much insulin.  At this event, psychiatry decided to increase Sertraline from 25-50mg.   PHQ-9 SCORE 3/3/2016 7/22/2016 10/23/2017   Total Score - - -   Total Score 3 0 0     Hypothyroidism: Patient is taking levothyroxine 125 mcg daily. Patient denies any signs/symptoms of hypothyroidism.   TSH   Date Value Ref Range Status   10/23/2017 39.01 (H) 0.40 - 4.00 mU/L Final     Hyperlipidemia: Current therapy includes Simvastatin 40mg once daily.  Pt reports no significant myalgias or other side effects. CVD history.   The ASCVD Risk score (Kyaw KELTON Jr, et al., 2013) failed to calculate for the following reasons:    The  2013 ASCVD risk score is only valid for ages 40 to 79    Supplements: Pt is currently taking Calcium 500mg daily in addition to 3 servings of milk daily. She is also taking Vitamin B12 1000mcg daily.     COPD: Current medications: Symbicort prn, Ventolin (has not been using). Does not rinse out her mouth after using. Pt states that she is currently using the Symbicort inhaler for when she is short of breath. She states her ventolin is a very small inhaler.     Pt is not experiencing side effects.   Pt reports the following symptoms: none.  Pt does have an COPD Action Plan on file- although update due this month.   Has spirometry been completed: Yes     Current labs include:  BP Readings from Last 3 Encounters:   01/04/18 99/67   10/23/17 135/69   07/10/17 102/67     Today's Vitals: There were no vitals taken for this visit.  Lab Results   Component Value Date    A1C 7.2 10/23/2017   .  Lab Results   Component Value Date    CHOL 163 07/15/2016     Lab Results   Component Value Date    TRIG 75 07/15/2016     Lab Results   Component Value Date    HDL 80 07/15/2016     Lab Results   Component Value Date    LDL 68 07/15/2016       Liver Function Studies -   Recent Labs   Lab Test  10/23/17   1418   07/24/12   0858   PROTTOTAL   --    --   7.2   ALBUMIN  3.8   < >  3.9   BILITOTAL   --    --   0.3   ALKPHOS   --    --   102   AST   --    --   40   ALT   --    --   10    < > = values in this interval not displayed.       Lab Results   Component Value Date    UCRR 204 04/08/2016    MICROL 65 04/08/2016    UMALCR 31.76 (H) 04/08/2016       Last Basic Metabolic Panel:  Lab Results   Component Value Date     10/23/2017      Lab Results   Component Value Date    POTASSIUM 4.3 10/23/2017     Lab Results   Component Value Date    CHLORIDE 97 10/23/2017     Lab Results   Component Value Date    BUN 16 10/23/2017     Lab Results   Component Value Date    CR 1.38 10/23/2017     GFR Estimate   Date Value Ref Range Status    10/23/2017 37 (L) >60 mL/min/1.7m2 Final     Comment:     Non  GFR Calc   01/04/2017 35 (L) >60 mL/min/1.7m2 Final     Comment:     Non  GFR Calc   07/15/2016 48 (L) >60 mL/min/1.7m2 Final     Comment:     Non  GFR Calc     TSH   Date Value Ref Range Status   10/23/2017 39.01 (H) 0.40 - 4.00 mU/L Final     Most Recent Immunizations   Administered Date(s) Administered     HEPA 04/14/2008     HepB 04/14/2008     Influenza (H1N1) 01/19/2010     Influenza (High Dose) 3 valent vaccine 10/01/2016     Influenza (IIV3) PF 09/19/2013     Pneumo Conj 13-V (2010&after) 02/10/2017     Pneumococcal 23 valent 12/12/2007     TD (ADULT, 7+) 11/04/2004     TDAP Vaccine (Boostrix) 06/13/2014     Zoster vaccine, live 08/11/2009       ASSESSMENT:                             Current medications were reviewed today.     Medication Adherence: Stable. Although patient has recently complained of copayment costs.     Diabetes:  Stable. Patient currently meets goal of A1C <8% (per ADA given older age and comorbidities). Pt has had hypoglycemic coma in the past, so a higher A1c goal of 8% is recommended.    Hypertension/Edema: Needs Improvement. Current blood pressure unknown at time of visit, although goal of therapy is <130/80. To further direct BP management and treatment regimen, patient would benefit from checking daily home BP readings. At the beginning of 2018, patient neared a hypotensive state, but this may have been associated with her hospitalization associated with insulin overdose and hypoglycemia.     Depression/Memory Loss: Pt showed interest in coming off of her depression medication and we discussed this as a possibility in the future. But after review of patient's chart I do not think it would be a wise idea considering her recent suicide attempt. Unclear depression history as patient is a poor historian. Memory is impaired, unable to use teach back method and pt repeats  specific phrases throughout visit (eg. My parents weren't on any medications when they were 80). Could consider treatment of dementia after official diagnosis.     Hypothyroidism: Needs improvement. Current state of hypothyroidism is unknown due to lack of recent thyroid labs on file since her last levothyroxine dose increase. Patient would benefit from additional lab testing to get current T4 and TSH levels to assess if hypothyroidism is controlled with current dosing regimen.     Hyperlipidemia: Stable. Patient at goal LDL<100 mg/dL while on a moderate-intensity statin therapy. Continue current medication regimen.    Supplements: Stable.    COPD: Needs improvement. It does not appear that patient has any current COPD symptoms that interfere with daily living. Although, based upon how patient reports using current inhalers, she was educated on COPD medication regarding her controller and rescue inhalers. The prescribed Symbicort is to be used as a maintenance agent twice daily and the ventolin is to be used as her emergency inhaler only as needed for exacerbations.     PLAN:                            Patient to...  1. Monitor and record daily BP readings.   2. Have thyroid blood levels tested at next primary care visit.   3. Take Symbicort 1puff 2 times daily for controlling COPD symptoms and use Ventolin inhaler as needed for shortness of breath. Rinse mouth after Symbicort use.    I spent 60 minutes with this patient today. All changes were made via collaborative practice agreement with David Finney. A copy of the visit note was provided to the patient's primary care provider.    Will follow up in 3/9/18 at 12:30pm.    The patient was mailed a summary of these recommendations as an after visit summary.     Thanks,   Whit Mccullough, PharmD Student    Jamie Cunningham, PharmD  [facility] Pharmacist    Phone: [phone]

## 2018-02-16 NOTE — Clinical Note
Has patient been officially evaluated for dementia? Donepezil or Namenda may not be a bad treatment choice for the future.

## 2018-02-16 NOTE — PATIENT INSTRUCTIONS
"   Recommendations from today's MTM visit:                                                      1. Monitor and record your daily blood pressure readings. We will review these on the phone during our next appointment.     2. Get your thyroid labs drawn next time you are in clinic.     3. Symbicort is the controller inhaler for COPD. If you are short of breath, this will not improve it. It works by improving your breathing over a long period of time. If you are short of breath, use your Ventolin inhaler.  The Ventolin is your \"emergency\" inhaler to be used when you are short of breath.     4. Start taking your Symbicort inhaler 1 puff twice daily. Rinse your mouth after using this inhaler to reduce your risk for a thrush infection. Symbicort will improve your breathing overall.     Next MTM visit: 3/9/18 at 12:30pm over the phone    To schedule another MTM appointment, please call the clinic directly or you may call the MTM scheduling line at [phone] or toll-free at [phone].     My Clinical Pharmacist's contact information:                                                      It was a pleasure seeing you today!  Please feel free to contact me with any questions or concerns you have.      Jamie Cunningham, PharmD  MTM Pharmacist    Phone: [phone]     You may receive a survey about the MTM services you received.  I would appreciate your feedback to help me serve you better in the future. Please fill it out and return it when you can. Your comments will be anonymous.    "

## 2018-02-21 DIAGNOSIS — E10.22 TYPE 1 DIABETES MELLITUS WITH DIABETIC CHRONIC KIDNEY DISEASE, UNSPECIFIED CKD STAGE (H): Primary | ICD-10-CM

## 2018-02-21 DIAGNOSIS — E10.22 TYPE 1 DIABETES MELLITUS WITH DIABETIC CHRONIC KIDNEY DISEASE (H): ICD-10-CM

## 2018-02-21 NOTE — TELEPHONE ENCOUNTER
here and requesting refill of test strips for his wife. States she tests at least 4 times a day minimum and so she runs out of strips each month. Would like to know if directions can be changed on order to test up to 6 times a day so she can get more strips.  Order pended. Ginger Murguia RN

## 2018-03-09 ENCOUNTER — ALLIED HEALTH/NURSE VISIT (OUTPATIENT)
Dept: PHARMACY | Facility: CLINIC | Age: 83
End: 2018-03-09
Payer: COMMERCIAL

## 2018-03-09 DIAGNOSIS — E10.22 TYPE 1 DIABETES MELLITUS WITH DIABETIC CHRONIC KIDNEY DISEASE, UNSPECIFIED CKD STAGE (H): Primary | ICD-10-CM

## 2018-03-09 DIAGNOSIS — I10 HYPERTENSION GOAL BP (BLOOD PRESSURE) < 140/90: ICD-10-CM

## 2018-03-09 DIAGNOSIS — J42 CHRONIC BRONCHITIS, UNSPECIFIED CHRONIC BRONCHITIS TYPE (H): ICD-10-CM

## 2018-03-09 PROCEDURE — 99606 MTMS BY PHARM EST 15 MIN: CPT | Performed by: PHARMACIST

## 2018-03-09 NOTE — PATIENT INSTRUCTIONS
Recommendations from today's MTM visit:                                                      1. Reduce your insulin doses as listed below:   -Tuojeo- 9 units once daily   -Humalog- 12 units before breakfast, 8 units before lunch, and 10 units before dinner.     2. Start checking your blood pressure a few times a week too. We will check these next week.    3. Ask your pharmacy for a refill of Ventolin. This is your rescue inhaler and you should always have one at home in case you become short of breath! Symbicort does not work quickly enough to do this.     Next MTM visit: 3/16 at 12:30pm    To schedule another MTM appointment, please call the clinic directly or you may call the MTM scheduling line at [phone] or toll-free at [phone].     My Clinical Pharmacist's contact information:                                                      It was a pleasure seeing you today!  Please feel free to contact me with any questions or concerns you have.      Jamie Cunningham, PharmD  MTM Pharmacist    Phone: [phone]     You may receive a survey about the MTM services you received.  I would appreciate your feedback to help me serve you better in the future. Please fill it out and return it when you can. Your comments will be anonymous.

## 2018-03-09 NOTE — PROGRESS NOTES
"SUBJECTIVE/OBJECTIVE:                           Claudia Presley is a 82 year old female called for a follow up visit for Medication Therapy Management.  She was referred to me from Dr. Finney.     Chief Complaint: Follow up from previous visit.     Allergies/ADRs: Reviewed in Epic  Tobacco: No tobacco use, was previous smoker but quit in 1998  Alcohol: not currently using  Caffeine: no caffeine- drinks decaf coffee  Activity: Moving around the house, goes for walks in the summer.   PMH: Reviewed in Epic    Medication Adherence/Access  The patient misses their medication 0 times per week.    Patient is responsible for his/her own medications. Uses a pill box that she sets up based off of the directions on the bottle. Per patient chart notes, patient has poor memory which presents as a challenge to managing her overall health.   Medication barriers: issues identified by patient - affording medications, especially insulin.   The patient fills general medications at [facility]  Has the patient been offered to fill at [place]? Yes- not close.    Diabetes:  Pt currently taking Toujeo 12 units daily in the morning, Humalog 12 U before breakfast, 10 U before lunch, 11 U before supper.  Pt is not experiencing side effects. Patient states that she always eats a meal after she injects her insulin.  Patient reports that it is better for her BG readings to be \"high\" verses \"low\".   SMBG: four times daily.   Ranges (patient reported):   Date FBG/ 2hours post Lunch/2hours post Dinner /2hours post    3/9 65 (orange juice) 97     3/8 52 (orange juice) 154 187 73=>76 100   3/7 216 100 127 119   3/6 49=>95 308 (14 U Humalog) 69 140   3/5 89=>137 127 175 103   3/4 79=>144 223 (10 U Humalog) 30=>56=>89      Date FBG/ 2hours post Lunch/2hours post Dinner /2hours post   2/16 117     2/15 77 (orange juice, 134) 117 (10 U) 83   2/14 212 407 (16 units) 104   2/13 105 197 130   2/12 132 198 (10 U) 336   2/11 167 200 (11 U) 76=>83 (After a " cup of orange juice)     Patient is experiencing hypoglycemia. Frequency of hypoglycemia? 1-2 times per week. Symptoms of low blood sugar? Coma, patient states she will pass out upon low blood glucose levels.  Recent symptoms of high blood sugar? none  Eye exam: up to date  Foot exam: up to date  Microalbumin is not < 30 mg/g. Pt is taking an ACEi/ARB.  Aspirin: Taking 81mg daily and denies side effects  Diet/Exercise: Patient states she is up throughout the day at home and goes for walks during the summer, although is not exercising regularly because of the winter weather.     Hypertension/Edema: Current medications include Clonidine 0.1mg BID, Metoprolol ER 50mg daily, Furosemide 20mg daily, Amlodipine 2.5mg daily, Losartan 12.5mg daily. Patient does not self-monitor BP, although states she has a BP monitor at home. Patient reports the following medication side effects: none. Pt denies edema in the evening.     COPD: Current medications: Symbicort BID, Ventolin (has not been using). Does not rinse out her mouth after using. Pt states that she is currently using the Symbicort inhaler for when she is short of breath. She states her ventolin is a very small inhaler.   Pt is not experiencing side effects.   Pt reports the following symptoms: none.  Pt does have an COPD Action Plan on file- although update due this month.   Has spirometry been completed: Yes     Current labs include:  BP Readings from Last 3 Encounters:   01/04/18 99/67   10/23/17 135/69   07/10/17 102/67     Today's Vitals: There were no vitals taken for this visit.  Lab Results   Component Value Date    A1C 7.2 10/23/2017   .  Lab Results   Component Value Date    CHOL 163 07/15/2016     Lab Results   Component Value Date    TRIG 75 07/15/2016     Lab Results   Component Value Date    HDL 80 07/15/2016     Lab Results   Component Value Date    LDL 68 07/15/2016       Liver Function Studies -   Recent Labs   Lab Test  10/23/17   1418   07/24/12   0858    PROTTOTAL   --    --   7.2   ALBUMIN  3.8   < >  3.9   BILITOTAL   --    --   0.3   ALKPHOS   --    --   102   AST   --    --   40   ALT   --    --   10    < > = values in this interval not displayed.       Lab Results   Component Value Date    UCRR 204 04/08/2016    MICROL 65 04/08/2016    UMALCR 31.76 (H) 04/08/2016       Last Basic Metabolic Panel:  Lab Results   Component Value Date     10/23/2017      Lab Results   Component Value Date    POTASSIUM 4.3 10/23/2017     Lab Results   Component Value Date    CHLORIDE 97 10/23/2017     Lab Results   Component Value Date    BUN 16 10/23/2017     Lab Results   Component Value Date    CR 1.38 10/23/2017     GFR Estimate   Date Value Ref Range Status   10/23/2017 37 (L) >60 mL/min/1.7m2 Final     Comment:     Non  GFR Calc   01/04/2017 35 (L) >60 mL/min/1.7m2 Final     Comment:     Non  GFR Calc   07/15/2016 48 (L) >60 mL/min/1.7m2 Final     Comment:     Non  GFR Calc     TSH   Date Value Ref Range Status   10/23/2017 39.01 (H) 0.40 - 4.00 mU/L Final     Most Recent Immunizations   Administered Date(s) Administered     HEPA 04/14/2008     HepB 04/14/2008     Influenza (H1N1) 01/19/2010     Influenza (High Dose) 3 valent vaccine 10/01/2016     Influenza (IIV3) PF 09/19/2013     Pneumo Conj 13-V (2010&after) 02/10/2017     Pneumococcal 23 valent 12/12/2007     TD (ADULT, 7+) 11/04/2004     TDAP Vaccine (Boostrix) 06/13/2014     Zoster vaccine, live 08/11/2009       ASSESSMENT:                             Current medications were reviewed today.     Medication Adherence: Stable. Although patient has recently complained of copayment costs.     Diabetes:  Needs improvement. A1c at goal <8%. Patient had multiple dangerous lows this week, even one all the way at 30 before supper. It is unclear what is causing these. In any case, adjustments must be made as 5 out of the last 6 days have been <80. We will reduce Toujeo to 9  units daily, and change humalog to 12 units before breakfast, 8 units before lunch, and 10 units before supper.     Hypertension/Edema: Needs improvement. Last BP well below goal. Pt would benefit from monitoring BP at home as discussed last visit. Made same recommendation today.    COPD: Stable. PT does state she is using Symbicort twice daily as well as on an as needed basis. We discussed that Symbicort does not help with prn SOB. Pt's breathing has been going well per patient recently. Pt should use Ventolin for PRN SOB. Get refill of Ventolin at her pharmacy.     PLAN:                            Patient to...  1. Toujeo 9 units daily  2. Humalog Breakfast 12 units, Lunch 8 units, 10 units for dinner  3. Monitor and record daily BP readings.   4. Refill Ventolin inhaler. This your rescue inhaler that you can use when you are having shortness of breath. Have thyroid blood levels tested at next primary care visit.     I spent 15 minutes with this patient today. All changes were made via collaborative practice agreement with David Finney. A copy of the visit note was provided to the patient's primary care provider.    Will follow up in 3/16 at 12:30pm.    The patient was mailed a summary of these recommendations as an after visit summary.     Jamie Cunningham, PharmD  [facility] Pharmacist    Phone: [phone]

## 2018-03-09 NOTE — MR AVS SNAPSHOT
After Visit Summary   3/9/2018    Claudia Presley    MRN: [id number]           Patient Information     Date Of Birth          1935        Visit Information        Provider Department      3/9/2018 12:30 [facility]        Today's Diagnoses     Type 1 diabetes mellitus with diabetic chronic kidney disease, unspecified CKD stage (H)    -  1    Hypertension goal BP (blood pressure) < 140/90        Chronic bronchitis, unspecified chronic bronchitis type (H)          Care Instructions    Recommendations from today's MTM visit:                                                      1. Reduce your insulin doses as listed below:   -Tuojeo- 9 units once daily   -Humalog- 12 units before breakfast, 8 units before lunch, and 10 units before dinner.     2. Start checking your blood pressure a few times a week too. We will check these next week.    3. Ask your pharmacy for a refill of Ventolin. This is your rescue inhaler and you should always have one at home in case you become short of breath! Symbicort does not work quickly enough to do this.     Next MTM visit: 3/16 at 12:30pm    To schedule another MTM appointment, please call the clinic directly or you may call the MTM scheduling line at [phone] or toll-free at [phone].     My Clinical Pharmacist's contact information:                                                      It was a pleasure seeing you today!  Please feel free to contact me with any questions or concerns you have.      Jamie Cunningham, PharmD  MTM Pharmacist    Phone: [phone]     You may receive a survey about the MTM services you received.  I would appreciate your feedback to help me serve you better in the future. Please fill it out and return it when you can. Your comments will be anonymous.            Follow-ups after your visit        Your next 10 appointments already scheduled     Mar 16, 2018 12:30 PM CDT  "  TELEMEDICINE with Jamie Cunningham [facility] Medication Therapy Management ([facility])    [address]  3rd Floor  [place] [address] [phone]           Note: this is not an onsite visit; there is no need to come to the facility.            2018 11:30 AM CDT   Diabetic Education with AN DIABETIC ED RESOURCE   [facility] ([facility])    [address] [facility] [address] [phone]              Who to contact     If you have questions or need follow up information about today's clinic visit or your schedule please contact [facility] MEDICATION THERAPY MANAGEMENT directly at [phone].  Normal or non-critical lab and imaging results will be communicated to you by Tap[employer], letter or phone within 4 business days after the clinic has received the results. If you do not hear from us within 7 days, please contact the clinic through Tap[employer] or phone. If you have a critical or abnormal lab result, we will notify you by phone as soon as possible.  Submit refill requests through [employer] or call your pharmacy and they will forward the refill request to us. Please allow 3 business days for your refill to be completed.          Additional Information About Your Visit        [employer] Information     [employer] lets you send messages to your doctor, view your test results, renew your prescriptions, schedule appointments and more. To sign up, go to [email or web address] . Click on \"Log in\" on the left side of the screen, which will take you to the Welcome page. Then click on \"Sign up Now\" on the right side of the page.     You will be asked to enter the access code listed below, as well as some personal information. Please follow the directions to create your username and password.     Your access code is: [email or web address]  Expires: 5/10/2018 12:51 PM     Your access code will  in 90 days. If you need help or a " new code, please call your [facility] clinic or [phone].        Care EveryWhere ID     This is your Care EveryWhere ID. This could be used by other organizations to access your [facility] medical records  [id number]         Blood Pressure from Last 3 Encounters:   01/04/18 99/67   10/23/17 135/69   07/10/17 102/67    Weight from Last 3 Encounters:   01/04/18 129 lb (58.5 kg)   10/23/17 135 lb (61.2 kg)   07/10/17 132 lb (59.9 kg)              Today, you had the following     No orders found for display         Today's Medication Changes          These changes are accurate as of 3/9/18  1:09 PM.  If you have any questions, ask your nurse or doctor.               These medicines have changed or have updated prescriptions.        Dose/Directions    amLODIPine 2.5 MG tablet   Commonly known as:  NORVASC   This may have changed:    - how much to take  - how to take this  - when to take this  - additional instructions   Used for:  Essential hypertension with goal blood pressure less than 140/90        TAKE 2 TABLETS BY MOUTH DAILY   Quantity:  180 tablet   Refills:  1       insulin glargine U-300 300 UNIT/ML injection   Commonly known as:  TOUJEO   This may have changed:  how much to take   Used for:  Type 1 diabetes mellitus with diabetic chronic kidney disease, unspecified CKD stage (H)        Dose:  9 Units   Inject 9 Units Subcutaneous every morning   Quantity:  13.5 mL   Refills:  3                Primary Care Provider Office Phone # Fax #    David Thomas Finney [phone] [address] [address] [address]        Equal Access to Services     KIRAN SHERIFF : Hadsilvina madsen Sotessy, waaxda luqadaha, qaybta kaalmada micky kimble. So [facility] [phone].    ATENCIÓN: Si habla español, tiene a herndon disposición servicios gratuitos de asistencia lingüística. Llame al [phone].    We comply with applicable federal civil rights laws and [place]  laws. We do not discriminate on the basis of race, color, national origin, age, disability, sex, sexual orientation, or gender identity.            Thank you!     Thank you for choosing [facility] MEDICATION THERAPY MANAGEMENT  for your care. Our goal is always to provide you with excellent care. Hearing back from our patients is one way we can continue to improve our services. Please take a few minutes to complete the written survey that you may receive in the mail after your visit with us. Thank you!             Your Updated Medication List - Protect others around you: Learn how to safely use, store and throw away your medicines at www.disposemymeds.org.          This list is accurate as of 3/9/18  1:09 PM.  Always use your most recent med list.                   Brand Name Dispense Instructions for use Diagnosis    albuterol 108 (90 BASE) MCG/ACT Inhaler    PROAIR HFA/PROVENTIL HFA/VENTOLIN HFA     Inhale 2 puffs into the lungs every 6 hours        amLODIPine 2.5 MG tablet    NORVASC    180 tablet    TAKE 2 TABLETS BY MOUTH DAILY    Essential hypertension with goal blood pressure less than 140/90       aspirin 81 MG tablet      1 TABLET DAILY        blood glucose lancets standard    no brand specified    100 each    Use to test blood sugar 4 times daily or as directed. 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 Dispense One Touch Ultra test strips or per patient/insurance preference    Type 1 diabetes mellitus with diabetic chronic kidney disease, unspecified CKD stage (H)        budesonide-formoterol 160-4.5 MCG/ACT Inhaler    SYMBICORT    3 Inhaler    Inhale 1 puff into the lungs 2 times daily Daily inhaler for copd Pharmacy ok to hold prescription until due    Chronic bronchitis, unspecified chronic bronchitis type (H)       calcium-vitamin D 500-125 MG-UNIT Tabs      Take 1 tablet by mouth daily        cloNIDine 0.1 MG tablet    CATAPRES    180 tablet    TAKE 1 TABLET BY MOUTH 2 TIMES DAILY FOR BLOOD PRESSURE    Essential hypertension with goal blood pressure less than 140/90       cyanocobalamin 1000 MCG tablet    vitamin  B-12     Take 1,000 mcg by mouth daily        FUROSEMIDE PO      Take 20 mg by mouth daily        glucagon 1 MG kit    GLUCAGON EMERGENCY    1 mg    Inject 1 mg into the muscle once for 1 dose    Type 1 diabetes mellitus with diabetic chronic kidney disease, unspecified CKD stage (H)       HumaLOG KWIKpen 100 UNIT/ML injection   Generic drug:  insulin lispro     1 mL    12 units before breakfast, 10 units before lunch, 11 units before dinner 3 month supply please    Type 1 diabetes mellitus with diabetic chronic kidney disease, unspecified CKD stage (H)       insulin glargine U-300 300 UNIT/ML injection    TOUJEO    13.5 mL    Inject 9 Units Subcutaneous every morning    Type 1 diabetes mellitus with diabetic chronic kidney disease, unspecified CKD stage (H)       * insulin pen needle 32G X 4 MM    BD RACHEL U/F    400 each    Use four times daily.    Type 1 diabetes, HbA1c goal < 8% (H)       * BD RACHEL U/F 32G X 4 MM   Generic drug:  insulin pen needle     200 each    use 4 times daily    Type 1 diabetes, HbA1c goal < 8% (H)       levothyroxine 125 MCG tablet    SYNTHROID/LEVOTHROID    90 tablet    Take 1 tablet (125 mcg) by mouth daily This is a higher dosage for thyroid for energy/keep warmer    Hypothyroidism, unspecified type       losartan 25 MG tablet    COZAAR    45 tablet    Take 0.5 tablets (12.5 mg) by mouth daily For blood pressure with breakfast    CKD  (chronic kidney disease) stage 3, GFR 30-59 ml/min       metoprolol succinate 50 MG 24 hr tablet    TOPROL-XL    90 tablet    Take 1 tablet (50 mg) by mouth daily For blood pressure and lowers pulse. Pharmacy ok to hold prescription until due    Essential hypertension with goal blood pressure less than 140/90       order for DME     3 Units    Equipment being ordered: Freestyle Cesar CGM (continuous glucose monitoring system) Will need 1 reader device and 3 sensor discs.  This is for one month supply. When refills she will need 3 sensor discs each month, or 9 sensors for 3 months supply    Diabetes mellitus, type 2 (H)       sertraline 50 MG tablet    ZOLOFT    90 tablet    Take 1 tablet (50 mg) by mouth daily For depression/anxiety (this is double dosage from before).    Major depressive disorder, recurrent episode, moderate (H)       simvastatin 20 MG tablet    ZOCOR    90 tablet    TAKE ONE TABLET BY MOUTH ONCE DAILY. AT BEDTIME FOR CHOLESTEROL    Hyperlipidemia LDL goal <100       * Notice:  This list has 2 medication(s) that are the same as other medications prescribed for you. Read the directions carefully, and ask your doctor or other care provider to review them with you.

## 2018-03-14 DIAGNOSIS — E10.22 TYPE 1 DIABETES MELLITUS WITH DIABETIC CHRONIC KIDNEY DISEASE, UNSPECIFIED CKD STAGE (H): ICD-10-CM

## 2018-03-15 NOTE — TELEPHONE ENCOUNTER
insulin glargine U-300 (TOUJEO) 300 UNIT/ML injection  Inject 9 Units Subcutaneous every morning       Disp: 13.5 mL Refills: 3    Class: E-Prescribe Start: 3/14/2018   For: Type 1 diabetes mellitus with diabetic chronic kidney disease, unspecified CKD stage (H)  Originally ordered: 1 month ago by David Finney MD  Long Acting Insulin Protocol Failed3/14 10:09 AM   LDL on file in past 12 months    Microalbumin on file in past 12 months    Blood pressure less than 140/90 in past 6 months    Serum creatinine on file in past 12 months    HgbA1C in past 3 or 6 months    Patient is age 18 or older    Recent (6 mo) or future (30 days) visit within the authorizing provider's specialty     Call to pharmacy.  They do not have the prescription from 1/19 on file stating dose increase to 12units.  I resent it.  Audrey Tillman RN

## 2018-03-16 ENCOUNTER — ALLIED HEALTH/NURSE VISIT (OUTPATIENT)
Dept: PHARMACY | Facility: CLINIC | Age: 83
End: 2018-03-16
Payer: COMMERCIAL

## 2018-03-16 DIAGNOSIS — I10 HYPERTENSION GOAL BP (BLOOD PRESSURE) < 140/90: ICD-10-CM

## 2018-03-16 DIAGNOSIS — E10.22 TYPE 1 DIABETES MELLITUS WITH DIABETIC CHRONIC KIDNEY DISEASE, UNSPECIFIED CKD STAGE (H): Primary | ICD-10-CM

## 2018-03-16 PROCEDURE — 99606 MTMS BY PHARM EST 15 MIN: CPT | Performed by: PHARMACIST

## 2018-03-16 NOTE — PATIENT INSTRUCTIONS
Recommendations from today's MTM visit:                                                      1. Reduce your Toujeo to 9 units once daily before breakfast.    2. Reduce your Humalog to 12 units with breakfast, 8 units with lunch, and 10 units with dinner.    Next MTM visit: 3/22/18 at 10 AM    To schedule another MTM appointment, please call the clinic directly or you may call the MTM scheduling line at [phone] or toll-free at [phone].     My Clinical Pharmacist's contact information:                                                      It was a pleasure seeing you today!  Please feel free to contact me with any questions or concerns you have.      Jamie Cunningham, PharmD  MTM Pharmacist    Phone: [phone]     You may receive a survey about the MTM services you received.  I would appreciate your feedback to help me serve you better in the future. Please fill it out and return it when you can. Your comments will be anonymous.

## 2018-03-16 NOTE — MR AVS SNAPSHOT
After Visit Summary   3/16/2018    Claudia Presley    MRN: [id number]           Patient Information     Date Of Birth          1935        Visit Information        Provider Department      3/16/2018 12:30 PM Jamie Cunningham RPH [facility] Medication Therapy Management        Today's Diagnoses     Type 1 diabetes mellitus with diabetic chronic kidney disease, unspecified CKD stage (H)    -  1    Hypertension goal BP (blood pressure) < 140/90          Care Instructions    Recommendations from today's MTM visit:                                                      1. Reduce your Toujeo to 9 units once daily before breakfast.    2. Reduce your Humalog to 12 units with breakfast, 8 units with lunch, and 10 units with dinner.    Next MTM visit: 3/22/18 at 10 AM    To schedule another MTM appointment, please call the clinic directly or you may call the MTM scheduling line at [phone] or toll-free at [phone].     My Clinical Pharmacist's contact information:                                                      It was a pleasure seeing you today!  Please feel free to contact me with any questions or concerns you have.      Jamie Cunningham, PharmD  MTM Pharmacist    Phone: [phone]     You may receive a survey about the MTM services you received.  I would appreciate your feedback to help me serve you better in the future. Please fill it out and return it when you can. Your comments will be anonymous.              Follow-ups after your visit        Your next 10 appointments already scheduled     Mar 22, 2018 11:00 AM CDT   TELEMEDICINE with [facility] Medication Therapy Management ([facility])    [address]  3rd Floor  [place] [address] [phone]           Note: this is not an onsite visit; there is no need to come to the facility.            Apr 17, 2018 11:30 AM CDT   Diabetic Education with AN DIABETIC ED  "RESOURCE   [facility] [place] ([facility])    [address] [address] [phone]              Who to contact     If you have questions or need follow up information about today's clinic visit or your schedule please contact [facility] MEDICATION THERAPY MANAGEMENT directly at [phone].  Normal or non-critical lab and imaging results will be communicated to you by MyChart, letter or phone within 4 business days after the clinic has received the results. If you do not hear from us within 7 days, please contact the clinic through [employer] or phone. If you have a critical or abnormal lab result, we will notify you by phone as soon as possible.  Submit refill requests through [employer] or call your pharmacy and they will forward the refill request to us. Please allow 3 business days for your refill to be completed.          Additional Information About Your Visit        [employer] Information     [employer] lets you send messages to your doctor, view your test results, renew your prescriptions, schedule appointments and more. To sign up, go to [email or web address] . Click on \"Log in\" on the left side of the screen, which will take you to the Welcome page. Then click on \"Sign up Now\" on the right side of the page.     You will be asked to enter the access code listed below, as well as some personal information. Please follow the directions to create your username and password.     Your access code is: [email or web address]  Expires: 5/10/2018  1:51 PM     Your access code will  in 90 days. If you need help or a new code, please call your [facility] clinic or [phone].        Care EveryWhere ID     This is your Care EveryWhere ID. This could be used by other organizations to access your [facility] medical records  [id number]         Blood Pressure from Last 3 Encounters:   18 99/67   10/23/17 135/69   07/10/17 102/67    Weight from Last 3 Encounters:   18 129 lb " (58.5 kg)   10/23/17 135 lb (61.2 kg)   07/10/17 132 lb (59.9 kg)              Today, you had the following     No orders found for display         Today's Medication Changes          These changes are accurate as of 3/16/18 12:55 PM.  If you have any questions, ask your nurse or doctor.               These medicines have changed or have updated prescriptions.        Dose/Directions    amLODIPine 2.5 MG tablet   Commonly known as:  NORVASC   This may have changed:    - how much to take  - how to take this  - when to take this  - additional instructions   Used for:  Essential hypertension with goal blood pressure less than 140/90        TAKE 2 TABLETS BY MOUTH DAILY   Quantity:  180 tablet   Refills:  1                Primary Care Provider Office Phone # Fax #    David Finney [phone] [address] [address] [address] [facility] [address]        Equal Access to Services     K[facility]AN [facility] : Hadii matias dickens hadasho Soomaali, waaxda luqadaha, qaybta kaalmada adeegyada, micky [place] . So [facility] [phone].    ATENCIÓN: Si habla español, tiene a herndon disposición servicios gratuitos de asistencia lingüística. Wing al [phone].    We comply with applicable federal civil rights laws and [place] laws. We do not discriminate on the basis of race, color, national origin, age, disability, sex, sexual orientation, or gender identity.            Thank you!     Thank you for choosing [facility] MEDICATION THERAPY MANAGEMENT  for your care. Our goal is always to provide you with excellent care. Hearing back from our patients is one way we can continue to improve our services. Please take a few minutes to complete the written survey that you may receive in the mail after your visit with us. Thank you!             Your Updated Medication List - Protect others around you: Learn how to safely use, store and throw away your medicines at www.disposemymeds.org.          This list is accurate as of  3/16/18 12:55 PM.  Always use your most recent med list.                   Brand Name Dispense Instructions for use Diagnosis    albuterol 108 (90 BASE) MCG/ACT Inhaler    PROAIR HFA/PROVENTIL HFA/VENTOLIN HFA     Inhale 2 puffs into the lungs every 6 hours        amLODIPine 2.5 MG tablet    NORVASC    180 tablet    TAKE 2 TABLETS BY MOUTH DAILY    Essential hypertension with goal blood pressure less than 140/90       aspirin 81 MG tablet      1 TABLET DAILY        blood glucose lancets standard    no brand specified    100 each    Use to test blood sugar 4 times daily or as directed. 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 Dispense One Touch Ultra test strips or per patient/insurance preference    Type 1 diabetes mellitus with diabetic chronic kidney disease, unspecified CKD stage (H)       budesonide-formoterol 160-4.5 MCG/ACT Inhaler    SYMBICORT    3 Inhaler    Inhale 1 puff into the lungs 2 times daily Daily inhaler for copd Pharmacy ok to hold prescription until due    Chronic bronchitis, unspecified chronic bronchitis type (H)       calcium-vitamin D 500-125 MG-UNIT Tabs      Take 1 tablet by mouth daily        cloNIDine 0.1 MG tablet    CATAPRES    180 tablet    TAKE 1 TABLET BY MOUTH 2 TIMES DAILY FOR BLOOD PRESSURE    Essential hypertension with goal blood pressure less than 140/90       cyanocobalamin 1000 MCG tablet    vitamin  B-12     Take 1,000 mcg by mouth daily        FUROSEMIDE PO      Take 20 mg by mouth daily        glucagon 1 MG kit    GLUCAGON EMERGENCY     1 mg    Inject 1 mg into the muscle once for 1 dose    Type 1 diabetes mellitus with diabetic chronic kidney disease, unspecified CKD stage (H)       HumaLOG KWIKpen 100 UNIT/ML injection   Generic drug:  insulin lispro     1 mL    12 units before breakfast, 10 units before lunch, 11 units before dinner 3 month supply please    Type 1 diabetes mellitus with diabetic chronic kidney disease, unspecified CKD stage (H)       insulin glargine U-300 300 UNIT/ML injection    TOUJEO    13.5 mL    Inject 12 Units Subcutaneous every morning    Type 1 diabetes mellitus with diabetic chronic kidney disease, unspecified CKD stage (H)       * insulin pen needle 32G X 4 MM    BD RACHEL U/F    400 each    Use four times daily.    Type 1 diabetes, HbA1c goal < 8% (H)       * BD RACHEL U/F 32G X 4 MM   Generic drug:  insulin pen needle     200 each    use 4 times daily    Type 1 diabetes, HbA1c goal < 8% (H)       levothyroxine 125 MCG tablet    SYNTHROID/LEVOTHROID    90 tablet    Take 1 tablet (125 mcg) by mouth daily This is a higher dosage for thyroid for energy/keep warmer    Hypothyroidism, unspecified type       losartan 25 MG tablet    COZAAR    45 tablet    Take 0.5 tablets (12.5 mg) by mouth daily For blood pressure with breakfast    CKD (chronic kidney disease) stage 3, GFR 30-59 ml/min       metoprolol succinate 50 MG 24 hr tablet    TOPROL-XL    90 tablet    Take 1 tablet (50 mg) by mouth daily For blood pressure and lowers pulse. Pharmacy ok to hold prescription until due    Essential hypertension with goal blood pressure less than 140/90       order for DME     3 Units    Equipment being ordered: Freestyle Cesar CGM (continuous glucose monitoring system) Will need 1 reader device and 3 sensor discs.  This is for one month supply. When refills she will need 3 sensor discs each month, or 9 sensors for 3 months supply    Diabetes mellitus, type 2 (H)       sertraline 50 MG tablet    ZOLOFT    90 tablet    Take 1 tablet (50  mg) by mouth daily For depression/anxiety (this is double dosage from before).    Major depressive disorder, recurrent episode, moderate (H)       simvastatin 20 MG tablet    ZOCOR    90 tablet    TAKE ONE TABLET BY MOUTH ONCE DAILY. AT BEDTIME FOR CHOLESTEROL    Hyperlipidemia LDL goal <100       * Notice:  This list has 2 medication(s) that are the same as other medications prescribed for you. Read the directions carefully, and ask your doctor or other care provider to review them with you.

## 2018-03-16 NOTE — PROGRESS NOTES
"SUBJECTIVE/OBJECTIVE:                           Claudia Presley is a 82 year old female called for a follow up visit for Medication Therapy Management.  She was referred to me from Dr. Finney.     Chief Complaint: Follow up from previous visit.     Allergies/ADRs: Reviewed in Epic  Tobacco: No tobacco use, was previous smoker but quit in 1998  Alcohol: not currently using  Caffeine: no caffeine- drinks decaf coffee  Activity: Moving around the house, goes for walks in the summer.   PMH: Reviewed in Epic    Medication Adherence/Access  The patient misses their medication 0 times per week.    Patient is responsible for his/her own medications. Uses a pill box that she sets up based off of the directions on the bottle. Per patient chart notes, patient has poor memory which presents as a challenge to managing her overall health.   Medication barriers: issues identified by patient - affording medications, especially insulin.   The patient fills general medications at [facility]  Has the patient been offered to fill at [facility]? Yes- not close.    Diabetes:  Pt did not decrease insulin currently taking Toujeo 12 units daily in the morning, Humalog 12 U before breakfast, 10 U before lunch, 11 U before supper.  Pt is not experiencing side effects. Patient states that she always eats a meal after she injects her insulin.  Patient reports that it is better for her BG readings to be \"high\" verses \"low\".   SMBG: four times daily.   Ranges (patient reported):   Date FBG/ 2hours post Lunch/2hours post Dinner /2hours post Bedtime   3/16 99=>165 OJ      3/15 83=>162  255 195   3/14 78 => 101  60 96=>111   3/13 92 93 93 73=> 96 OJ   3/12 77=> 135  184 225   3/11 72=>  OJ  253 59=>74   3/10 70 =>170 OJ 49=>90   87 89=>102     Date FBG/ 2hours post Lunch/2hours post Dinner /2hours post    3/9 65 (orange juice) 97     3/8 52 (orange juice) 154 187 73=>76 100   3/7 216 100 127 119   3/6 49=>95 308 (14 U Humalog) 69 " 140   3/5 89=>137 127 175 103   3/4 79=>144 223 (10 U Humalog) 30=>56=>89      Patient is experiencing hypoglycemia. Frequency of hypoglycemia? 1-2 times per week. Symptoms of low blood sugar? Coma, patient states she will pass out upon low blood glucose levels.  Recent symptoms of high blood sugar? none  Eye exam: up to date  Foot exam: up to date  Microalbumin is not < 30 mg/g. Pt is taking an ACEi/ARB.  Aspirin: Taking 81mg daily and denies side effects  Diet/Exercise: Patient states she is up throughout the day at home and goes for walks during the summer, although is not exercising regularly because of the winter weather.      Hypertension/Edema: Current medications include Clonidine 0.1mg BID, Metoprolol ER 50mg daily, Furosemide 20mg daily, Amlodipine 2.5mg daily, Losartan 12.5mg daily. Patient does not self-monitor BP, have tried to get patient to check BP at home, but she has not over the past 2 visits despite . Patient reports the following medication side effects: none. Pt denies edema in the evening.     Current labs include:  BP Readings from Last 3 Encounters:   01/04/18 99/67   10/23/17 135/69   07/10/17 102/67     Today's Vitals: There were no vitals taken for this visit.  Lab Results   Component Value Date    A1C 7.2 10/23/2017   .  Lab Results   Component Value Date    CHOL 163 07/15/2016     Lab Results   Component Value Date    TRIG 75 07/15/2016     Lab Results   Component Value Date    HDL 80 07/15/2016     Lab Results   Component Value Date    LDL 68 07/15/2016       Liver Function Studies -   Recent Labs   Lab Test  10/23/17   1418   07/24/12   0858   PROTTOTAL   --    --   7.2   ALBUMIN  3.8   < >  3.9   BILITOTAL   --    --   0.3   ALKPHOS   --    --   102   AST   --    --   40   ALT   --    --   10    < > = values in this interval not displayed.       Lab Results   Component Value Date    UCRR 204 04/08/2016    MICROL 65 04/08/2016    UMALCR 31.76 (H) 04/08/2016       Last Basic Metabolic  Panel:  Lab Results   Component Value Date     10/23/2017      Lab Results   Component Value Date    POTASSIUM 4.3 10/23/2017     Lab Results   Component Value Date    CHLORIDE 97 10/23/2017     Lab Results   Component Value Date    BUN 16 10/23/2017     Lab Results   Component Value Date    CR 1.38 10/23/2017     GFR Estimate   Date Value Ref Range Status   10/23/2017 37 (L) >60 mL/min/1.7m2 Final     Comment:     Non  GFR Calc   01/04/2017 35 (L) >60 mL/min/1.7m2 Final     Comment:     Non  GFR Calc   07/15/2016 48 (L) >60 mL/min/1.7m2 Final     Comment:     Non  GFR Calc     TSH   Date Value Ref Range Status   10/23/2017 39.01 (H) 0.40 - 4.00 mU/L Final     Most Recent Immunizations   Administered Date(s) Administered     HEPA 04/14/2008     HepB 04/14/2008     Influenza (H1N1) 01/19/2010     Influenza (High Dose) 3 valent vaccine 10/01/2016     Influenza (IIV3) PF 09/19/2013     Pneumo Conj 13-V (2010&after) 02/10/2017     Pneumococcal 23 valent 12/12/2007     TD (ADULT, 7+) 11/04/2004     TDAP Vaccine (Boostrix) 06/13/2014     Zoster vaccine, live 08/11/2009       ASSESSMENT:                             Current medications were reviewed today.     Medication Adherence: Stable.     Diabetes:  Needs improvement. A1c at goal <8%. Pt is still having lows, did not make the insulin dose reductions at last visit. I had patient write them down this time. We will reduce Toujeo to 9 units daily, and change humalog to 12 units before breakfast, 8 units before lunch, and 10 units before supper.     Hypertension/Edema: Stable. It is clear I am unable to remind patient to check her blood pressures.     PLAN:                            Patient to...  1. Toujeo 9 units daily  2. Humalog Breakfast 12 units, Lunch 8 units, 10 units for dinner    I spent 15 minutes with this patient today. All changes were made via collaborative practice agreement with David Finney. FUAD  copy of the visit note was provided to the patient's primary care provider.    Will follow up in 3/22.    The patient was mailed a summary of these recommendations as an after visit summary.     German VillalpandoD  [facility] Pharmacist    Phone: [phone]

## 2018-03-22 ENCOUNTER — ALLIED HEALTH/NURSE VISIT (OUTPATIENT)
Dept: PHARMACY | Facility: CLINIC | Age: 83
End: 2018-03-22
Payer: COMMERCIAL

## 2018-03-22 ENCOUNTER — TELEPHONE (OUTPATIENT)
Dept: TRANSPLANT | Facility: CLINIC | Age: 83
End: 2018-03-22

## 2018-03-22 DIAGNOSIS — E10.22 TYPE 1 DIABETES MELLITUS WITH DIABETIC CHRONIC KIDNEY DISEASE, UNSPECIFIED CKD STAGE (H): Primary | ICD-10-CM

## 2018-03-22 PROCEDURE — 99207 ZZC NO CHARGE LOS: CPT | Performed by: PHARMACIST

## 2018-03-22 NOTE — MR AVS SNAPSHOT
"              After Visit Summary   3/22/2018    Claudia Presley    MRN: [id number]           Patient Information     Date Of Birth          1935        Visit Information        Provider Department      3/22/2018 11:00 AM Jamie Cunningham RPH [facility] Medication Therapy Management        Today's Diagnoses     Type 1 diabetes mellitus with diabetic chronic kidney disease, unspecified CKD stage (H)    -  1       Follow-ups after your visit        Your next 10 appointments already scheduled     Apr 17, 2018 11:30 AM CDT   Diabetic Education with AN DIABETIC ED RESOURCE   [facility] ([facility])    [address] [address] [phone]              Who to contact     If you have questions or need follow up information about today's clinic visit or your schedule please contact [facility] MEDICATION THERAPY MANAGEMENT directly at [phone].  Normal or non-critical lab and imaging results will be communicated to you by [employer], letter or phone within 4 business days after the clinic has received the results. If you do not hear from us within 7 days, please contact the clinic through [employer] or phone. If you have a critical or abnormal lab result, we will notify you by phone as soon as possible.  Submit refill requests through [employer] or call your pharmacy and they will forward the refill request to us. Please allow 3 business days for your refill to be completed.          Additional Information About Your Visit        MyChart Information     [employer] lets you send messages to your doctor, view your test results, renew your prescriptions, schedule appointments and more. To sign up, go to [email or web address] . Click on \"Log in\" on the left side of the screen, which will take you to the Welcome page. Then click on \"Sign up Now\" on the right side of the page.     You will be asked to enter the access code listed below, as well as some personal " information. Please follow the directions to create your username and password.     Your access code is: [email or web address]  Expires: 5/10/2018  1:51 PM     Your access code will  in 90 days. If you need help or a new code, please call your [place] clinic or [phone].        Care EveryWhere ID     This is your Care EveryWhere ID. This could be used by other organizations to access your [place] medical records  [id number]         Blood Pressure from Last 3 Encounters:   18 99/67   10/23/17 135/69   07/10/17 102/67    Weight from Last 3 Encounters:   18 129 lb (58.5 kg)   10/23/17 135 lb (61.2 kg)   07/10/17 132 lb (59.9 kg)              Today, you had the following     No orders found for display         Today's Medication Changes          These changes are accurate as of 3/22/18 11:22 AM.  If you have any questions, ask your nurse or doctor.               These medicines have changed or have updated prescriptions.        Dose/Directions    amLODIPine 2.5 MG tablet   Commonly known as:  NORVASC   This may have changed:    - how much to take  - how to take this  - when to take this  - additional instructions   Used for:  Essential hypertension with goal blood pressure less than 140/90        TAKE 2 TABLETS BY MOUTH DAILY   Quantity:  180 tablet   Refills:  1                Primary Care Provider Office Phone # Fax #    Davdi Thomas Finney [phone] [address] [facility] [address]        Equal Access to Services     KIRAN SHERIFF AH: Hadii matias ku hadasho Soomaali, waaxda luqadaha, qaybta kaalmada adeegyada, waxmaura orlin barrera. So [facility] [phone].    ATENCIÓN: Si habla español, tiene a herndon disposición servicios gratuitos de asistencia lingüística. Llame al [phone].    We comply with applicable federal civil rights laws and [place] laws. We do not discriminate on the basis of race, color, national origin, age, disability, sex, sexual orientation,  or gender identity.            Thank you!     Thank you for choosing [facility] MEDICATION THERAPY MANAGEMENT  for your care. Our goal is always to provide you with excellent care. Hearing back from our patients is one way we can continue to improve our services. Please take a few minutes to complete the written survey that you may receive in the mail after your visit with us. Thank you!             Your Updated Medication List - Protect others around you: Learn how to safely use, store and throw away your medicines at www.disposemymeds.org.          This list is accurate as of 3/22/18 11:22 AM.  Always use your most recent med list.                   Brand Name Dispense Instructions for use Diagnosis    albuterol 108 (90 BASE) MCG/ACT Inhaler    PROAIR HFA/PROVENTIL HFA/VENTOLIN HFA     Inhale 2 puffs into the lungs every 6 hours        amLODIPine 2.5 MG tablet    NORVASC    180 tablet    TAKE 2 TABLETS BY MOUTH DAILY    Essential hypertension with goal blood pressure less than 140/90       aspirin 81 MG tablet      1 TABLET DAILY        blood glucose lancets standard    no brand specified    100 each    Use to test blood sugar 4 times daily or as directed. 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 Dispense One Touch Ultra test strips or per patient/insurance preference    Type 1 diabetes mellitus with diabetic chronic kidney disease, unspecified CKD stage (H)       budesonide-formoterol 160-4.5 MCG/ACT Inhaler    SYMBICORT    3 Inhaler    Inhale 1 puff into the lungs 2 times  daily Daily inhaler for copd Pharmacy ok to hold prescription until due    Chronic bronchitis, unspecified chronic bronchitis type (H)       calcium-vitamin D 500-125 MG-UNIT Tabs      Take 1 tablet by mouth daily        cloNIDine 0.1 MG tablet    CATAPRES    180 tablet    TAKE 1 TABLET BY MOUTH 2 TIMES DAILY FOR BLOOD PRESSURE    Essential hypertension with goal blood pressure less than 140/90       cyanocobalamin 1000 MCG tablet    vitamin  B-12     Take 1,000 mcg by mouth daily        FUROSEMIDE PO      Take 20 mg by mouth daily        glucagon 1 MG kit    GLUCAGON EMERGENCY    1 mg    Inject 1 mg into the muscle once for 1 dose    Type 1 diabetes mellitus with diabetic chronic kidney disease, unspecified CKD stage (H)       HumaLOG KWIKpen 100 UNIT/ML injection   Generic drug:  insulin lispro     1 mL    12 units before breakfast, 10 units before lunch, 11 units before dinner 3 month supply please    Type 1 diabetes mellitus with diabetic chronic kidney disease, unspecified CKD stage (H)       insulin glargine U-300 300 UNIT/ML injection    TOUJEO    13.5 mL    Inject 12 Units Subcutaneous every morning    Type 1 diabetes mellitus with diabetic chronic kidney disease, unspecified CKD stage (H)       * insulin pen needle 32G X 4 MM    BD RACHEL U/F    400 each    Use four times daily.    Type 1 diabetes, HbA1c goal < 8% (H)       * BD RACHEL U/F 32G X 4 MM   Generic drug:  insulin pen needle     200 each    use 4 times daily    Type 1 diabetes, HbA1c goal < 8% (H)       levothyroxine 125 MCG tablet    SYNTHROID/LEVOTHROID    90 tablet    Take 1 tablet (125 mcg) by mouth daily This is a higher dosage for thyroid for energy/keep warmer    Hypothyroidism, unspecified type       losartan 25 MG tablet    COZAAR    45 tablet    Take 0.5 tablets (12.5 mg) by mouth daily For blood pressure with breakfast    CKD (chronic kidney disease) stage 3, GFR 30-59 ml/min       metoprolol succinate 50 MG 24 hr tablet    TOPROL-XL    90  tablet    Take 1 tablet (50 mg) by mouth daily For blood pressure and lowers pulse. Pharmacy ok to hold prescription until due    Essential hypertension with goal blood pressure less than 140/90       order for DME     3 Units    Equipment being ordered: Freestyle Cesar CGM (continuous glucose monitoring system) Will need 1 reader device and 3 sensor discs.  This is for one month supply. When refills she will need 3 sensor discs each month, or 9 sensors for 3 months supply    Diabetes mellitus, type 2 (H)       sertraline 50 MG tablet    ZOLOFT    90 tablet    Take 1 tablet (50 mg) by mouth daily For depression/anxiety (this is double dosage from before).    Major depressive disorder, recurrent episode, moderate (H)       simvastatin 20 MG tablet    ZOCOR    90 tablet    TAKE ONE TABLET BY MOUTH ONCE DAILY. AT BEDTIME FOR CHOLESTEROL    Hyperlipidemia LDL goal <100       * Notice:  This list has 2 medication(s) that are the same as other medications prescribed for you. Read the directions carefully, and ask your doctor or other care provider to review them with you.

## 2018-03-22 NOTE — PROGRESS NOTES
Called patient for scheduled appointment to review her blood sugars as she has been running quite low in the morning. Talked with patient's  Nando this time around who was confused about MTM services. He stated he does not want any appointments with [facility] at this time, and then hung up. Due to this abrupt exchange I was unable to explain our services and that I am, in fact, a [employer] employee.     I will refer back to Dr. Finney. As pt experiences frequent hypoglycemia throughout the week. If Dr. Finney would like pt to continue seeing MTM, I suggest discussing it with patient in person to avoid further confusion.     Thank you,    Jamie Cunningham, PharmD  [facility] Pharmacist    Phone: [phone]

## 2018-03-26 DIAGNOSIS — E10.9 TYPE 1 DIABETES, HBA1C GOAL < 8% (H): ICD-10-CM

## 2018-03-26 DIAGNOSIS — R60.0 PERIPHERAL EDEMA: ICD-10-CM

## 2018-03-26 RX ORDER — PEN NEEDLE, DIABETIC 32GX 5/32"
NEEDLE, DISPOSABLE MISCELLANEOUS
Qty: 200 EACH | Refills: 0 | Status: SHIPPED | OUTPATIENT
Start: 2018-03-26 | End: 2018-04-29

## 2018-03-26 RX ORDER — FUROSEMIDE 20 MG
TABLET ORAL
Qty: 30 TABLET | Refills: 0 | OUTPATIENT
Start: 2018-03-26

## 2018-04-17 ENCOUNTER — PATIENT OUTREACH (OUTPATIENT)
Dept: CARE COORDINATION | Facility: CLINIC | Age: 83
End: 2018-04-17

## 2018-04-17 ENCOUNTER — ALLIED HEALTH/NURSE VISIT (OUTPATIENT)
Dept: EDUCATION SERVICES | Facility: CLINIC | Age: 83
End: 2018-04-17
Payer: COMMERCIAL

## 2018-04-17 DIAGNOSIS — E11.9 DIABETES MELLITUS WITHOUT COMPLICATION (H): Primary | ICD-10-CM

## 2018-04-17 PROCEDURE — 99207 ZZC DROP WITH A PROCEDURE: CPT

## 2018-04-17 PROCEDURE — G0108 DIAB MANAGE TRN  PER INDIV: HCPCS

## 2018-04-17 ASSESSMENT — ACTIVITIES OF DAILY LIVING (ADL): DEPENDENT_IADLS:: MONEY MANAGEMENT;SHOPPING;TRANSPORTATION

## 2018-04-17 NOTE — PATIENT INSTRUCTIONS
My Diabetes Care Goals:    Taking Medication: I will Toujeo insulin take 10 units daily.  Humalog 11 units with breakfast and 11 units with lunch and 11 units with dinner.      Follow up:  Follow up with Dr. Finney for further instructions.       Bring blood glucose meter and logbook with you to all doctor and follow-up appointments.     [facility] for the [facility]:  For Your Diabetes Education and Nutrition Appointments Call:  [phone]   For Diabetes Education or Nutrition Related Questions:   Phone: [phone]  E-mail: [email or web address]  Fax: [phone]   If you need a medication refill please contact your pharmacy. Please allow 3 business days for your refills to be completed.    Instructions for emailing the Diabetes Educators    If you need to communicate a non-urgent message to a Diabetes Educator via email, please send to [email or web address].    Please follow the following email guidelines:    Subject line: Secure: your clinic name (example: Secure: [facility])  In the email please include: First name, middle initial, last name and date of birth.    We will be in touch with you within one (1) business day.
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
## 2018-04-17 NOTE — LETTER
[facility] CARE COORDINATION  [address] [facility] [address]      April 18, 2018    Claudia Presley  [address]  [place] [address]      Dear Claudia,    I am a clinic care coordinator who works with David Finney MD. I wanted to thank you for spending the time to talk with me.  I wanted to introduce myself and provide you with my contact information so that you can call me with questions or concerns about your health care. Below is a description of clinic care coordination and how I can further assist you.     The clinic care coordinator is a registered nurse and/or  who understand the health care system. The goal of clinic care coordination is to help you manage your health and improve access to the [facility] system in the most efficient manner. The registered nurse can assist you in meeting your health care goals by providing education, coordinating services, and strengthening the communication among your providers. The  can assist you with financial, behavioral, psychosocial, chemical dependency, counseling, and/or psychiatric resources.    Please feel free to contact me at [phone], with any questions or concerns. We at [facility] are focused on providing you with the highest-quality healthcare experience possible and that all starts with you.     Sincerely,     Jaqui Tobias

## 2018-04-17 NOTE — LETTER
Health Care Home - Access Care Plan    About Me  Patient Name:  Claudia Nguyen    YOB: 1935  Age:                             82 year old   Dave MRN:            [id number] Telephone Information:     Home Phone [phone]   Mobile [phone]       Address:    [address] [place] [address] Email address:  No e-mail address on record      Emergency Contact(s)  Name Relationship Lgl Grd Work Phone Home Phone Mobile Phone   1. KATIE NGUYEN Spouse   [phone]    2. LISA ABBOTT Friend   [phone]              Health Maintenance: Routine Health maintenance Reviewed: Due/Overdue     My Access Plan  Medical Emergency 911   Questions or concerns during clinic hours Primary Clinic Line, I will call the clinic directly: [facility] [phone]   24 Hour Appointment Line [phone] or  [phone]) (toll free)   24 Hour Nurse Line [phone] (toll free)   Questions or concerns outside clinic hours 24 Hour Appointment Line, I will call the after-hours on-call line:   [place] [phone] or [phone]) (toll-free)   Preferred Urgent Care [facility] [phone]   Preferred Hospital [facility]  [phone]   Preferred Pharmacy [facility] [id number] [place] [address]     Behavioral Health Crisis Line The National Suicide Prevention Lifeline at 1-535.372.2029 or 911     My Care Team Members  Patient Care Team       Relationship Specialty Notifications Start End    David Finney MD PCP - General Family Practice  1/14/14     Phone: [phone] Fax: [phone] [address] [address] [address]    Hilton Gibson MD MD Neurology  4/6/16     Comment:  [facility] [place]    Phone: [phone] Fax: [phone]         [facility] OF NEUROLOGY [address] [facility] [address]    Jaqui Tobias  [facility] Clinic Care Coordinator Primary Care - CC  4/18/18     Phone: [phone] Fax: [phone]               My Medical and Care Information  Problem List   Patient Active Problem List   Diagnosis     Hypothyroidism     HYPERLIPIDEMIA LDL GOAL <100     Encounter for counseling     Hypertension goal BP (blood pressure) < 140/90     Moderate major depression (H)     Pseudophakia, ou     CAD (coronary artery disease)     Type 1 diabetes, HbA1c goal < 8% (H)     COPD (chronic obstructive pulmonary disease) (H)     Spinal stenosis     Diverticulosis of colon     Advanced directives, counseling/discussion     CKD (chronic kidney disease) stage 3, GFR 30-59 ml/min     Peripheral edema     Osteoporosis     Personal history of smoking     BPPV (benign paroxysmal positional vertigo)     ABMD (anterior basement membrane dystrophy), ou     Hypothyroidism, unspecified hypothyroidism type     Memory loss     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

## 2018-04-17 NOTE — MR AVS SNAPSHOT
After Visit Summary   4/17/2018    Claudia Presley    MRN: [id number]           Patient Information     Date Of Birth          1935        Visit Information        Provider Department      4/17/2018 11:30 AM AN DIABETIC ED RESOURCE [facility]        Care Instructions    My Diabetes Care Goals:    Taking Medication: I will Toujeo insulin take 10 units daily.  Humalog 11 units with breakfast and 11 units with lunch and 11 units with dinner.      Follow up:  Follow up with Dr. Finney for further instructions.       Bring blood glucose meter and logbook with you to all doctor and follow-up appointments.     [facility] for the [facility] [[email or web address]]:  For Your Diabetes Education and Nutrition Appointments Call:  [phone]   For Diabetes Education or Nutrition Related Questions:   Phone: [phone]  E-mail: [email or web address]  Fax: [phone]   If you need a medication refill please contact your pharmacy. Please allow 3 business days for your refills to be completed.    Instructions for emailing the Diabetes Educators    If you need to communicate a non-urgent message to a Diabetes Educator via email, please send to [email or web address].    Please follow the following email guidelines:    Subject line: Secure: your clinic name (example: Secure: [place])  In the email please include: First name, middle initial, last name and date of birth.    We will be in touch with you within one (1) business day.             Follow-ups after your visit        Who to contact     If you have questions or need follow up information about today's clinic visit or your schedule please contact [place] [facility] directly at [phone].  Normal or non-critical lab and imaging results will be communicated to you by MyChart, letter or phone within 4 business days after the clinic has received the results. If you do not hear from us  "within 7 days, please contact the clinic through [employer] or phone. If you have a critical or abnormal lab result, we will notify you by phone as soon as possible.  Submit refill requests through [employer] or call your pharmacy and they will forward the refill request to us. Please allow 3 business days for your refill to be completed.          Additional Information About Your Visit        [employer] Information     [employer] lets you send messages to your doctor, view your test results, renew your prescriptions, schedule appointments and more. To sign up, go to [email or web address] . Click on \"Log in\" on the left side of the screen, which will take you to the Welcome page. Then click on \"Sign up Now\" on the right side of the page.     You will be asked to enter the access code listed below, as well as some personal information. Please follow the directions to create your username and password.     Your access code is: [email or web address]  Expires: 5/10/2018  1:51 PM     Your access code will  in 90 days. If you need help or a new code, please call your [facility] clinic or [phone].        Care EveryWhere ID     This is your Care EveryWhere ID. This could be used by other organizations to access your [facility] medical records  [id number]         Blood Pressure from Last 3 Encounters:   18 99/67   10/23/17 135/69   07/10/17 102/67    Weight from Last 3 Encounters:   18 58.5 kg (129 lb)   10/23/17 61.2 kg (135 lb)   07/10/17 59.9 kg (132 lb)              Today, you had the following     No orders found for display         Today's Medication Changes          These changes are accurate as of 18 12:32 PM.  If you have any questions, ask your nurse or doctor.               These medicines have changed or have updated prescriptions.        Dose/Directions    amLODIPine 2.5 MG tablet   Commonly known as:  NORVASC   This may have changed:    - how much to take  - how to take this  - when to take this  - " additional instructions   Used for:  Essential hypertension with goal blood pressure less than 140/90        TAKE 2 TABLETS BY MOUTH DAILY   Quantity:  180 tablet   Refills:  1                Primary Care Provider Office Phone # Fax #    David Finney [phone] [address] [address] [address]        Equal Access to Services     [facility] : Hadii aad ku hadasho Soomaali, waaxda luqadaha, qaybta kaalmada adeegyada, waxay idiin hayaan adelavell galindo ladawsonn . So [facility] [phone].    ATENCIÓN: Si habla español, tiene a herndon disposición servicios gratuitos de asistencia lingüística. Llame al [phone].    We comply with applicable federal civil rights laws and [place] laws. We do not discriminate on the basis of race, color, national origin, age, disability, sex, sexual orientation, or gender identity.            Thank you!     Thank you for choosing [facility]  for your care. Our goal is always to provide you with excellent care. Hearing back from our patients is one way we can continue to improve our services. Please take a few minutes to complete the written survey that you may receive in the mail after your visit with us. Thank you!             Your Updated Medication List - Protect others around you: Learn how to safely use, store and throw away your medicines at www.disposemymeds.org.          This list is accurate as of 4/17/18 12:32 PM.  Always use your most recent med list.                   Brand Name Dispense Instructions for use Diagnosis    albuterol 108 (90 Base) MCG/ACT Inhaler    PROAIR HFA/PROVENTIL HFA/VENTOLIN HFA     Inhale 2 puffs into the lungs every 6 hours        amLODIPine 2.5 MG tablet    NORVASC    180 tablet    TAKE 2 TABLETS BY MOUTH DAILY    Essential hypertension with goal blood pressure less than 140/90       aspirin 81 MG tablet      1 TABLET DAILY        blood glucose lancets standard    no brand specified    100 each     Use to test blood sugar 4 times daily or as directed. 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 Dispense One Touch Ultra test strips or per patient/insurance preference    Type 1 diabetes mellitus with diabetic chronic kidney disease, unspecified CKD stage (H)       budesonide-formoterol 160-4.5 MCG/ACT Inhaler    SYMBICORT    3 Inhaler    Inhale 1 puff into the lungs 2 times daily Daily inhaler for copd Pharmacy ok to hold prescription until due    Chronic bronchitis, unspecified chronic bronchitis type (H)       calcium-vitamin D 500-125 MG-UNIT Tabs      Take 1 tablet by mouth daily        cloNIDine 0.1 MG tablet    CATAPRES    180 tablet    TAKE 1 TABLET BY MOUTH 2 TIMES DAILY FOR BLOOD PRESSURE    Essential hypertension with goal blood pressure less than 140/90       cyanocobalamin 1000 MCG tablet    vitamin  B-12     Take 1,000 mcg by mouth daily        FUROSEMIDE PO      Take 20 mg by mouth daily        glucagon 1 MG kit    GLUCAGON EMERGENCY    1 mg    Inject 1 mg into the muscle once for 1 dose    Type 1 diabetes mellitus with diabetic chronic kidney disease, unspecified CKD stage (H)       * HumaLOG KWIKpen 100 UNIT/ML injection   Generic drug:  insulin lispro     1 mL    12 units before breakfast, 10 units before lunch, 11 units before dinner 3 month supply please    Type 1 diabetes mellitus with diabetic chronic kidney disease, unspecified CKD stage (H)       * insulin lispro 100 UNIT/ML injection    HumaLOG KWIKpen    33 mL    INJECT 13 UNITS UNDER THE SKIN BEFORE  BREAKFAST, LUNCH, AND DINNER    Type 1 diabetes mellitus with diabetic chronic kidney disease, unspecified CKD stage (H)       insulin glargine U-300 300 UNIT/ML injection    TOUJEO    13.5 mL    Inject 12 Units Subcutaneous every morning    Type 1 diabetes mellitus with diabetic chronic kidney disease, unspecified CKD stage (H)       * insulin pen needle 32G X 4 MM    BD RACHEL U/F    400 each    Use four times daily.    Type 1 diabetes, HbA1c goal < 8% (H)       * BD RACHEL U/F 32G X 4 MM   Generic drug:  insulin pen needle     200 each    use 4 times daily    Type 1 diabetes, HbA1c goal < 8% (H)       levothyroxine 125 MCG tablet    SYNTHROID/LEVOTHROID    90 tablet    Take 1 tablet (125 mcg) by mouth daily. This is a higher dosage for thyroid for energy/keep warmer.    Hypothyroidism, unspecified type       losartan 25 MG tablet    COZAAR    45 tablet    Take 0.5 tablets (12.5 mg) by mouth daily For blood pressure with breakfast    CKD (chronic kidney disease) stage 3, GFR 30-59 ml/min       metoprolol succinate 50 MG 24 hr tablet    TOPROL-XL    90 tablet    Take 1 tablet (50 mg) by mouth daily For blood pressure and lowers pulse. Pharmacy ok to hold prescription until due    Essential hypertension with goal blood pressure less than 140/90       order for DME     3 Units    Equipment being ordered: Freestyle Cesar CGM (continuous glucose monitoring system) Will need 1 reader device and 3 sensor discs.  This is for one month supply. When refills she will need 3 sensor discs each month, or 9 sensors for 3 months supply    Diabetes mellitus, type 2 (H)       sertraline 50 MG tablet    ZOLOFT    90 tablet    Take 1 tablet (50 mg) by mouth daily For depression/anxiety (this is double dosage from before).    Major depressive disorder, recurrent episode, moderate (H)       simvastatin 20 MG tablet    ZOCOR    90 tablet    TAKE ONE TABLET BY MOUTH ONCE DAILY. AT BEDTIME FOR CHOLESTEROL    Hyperlipidemia LDL goal <100        * Notice:  This list has 4 medication(s) that are the same as other medications prescribed for you. Read the directions carefully, and ask your doctor or other care provider to review them with you.

## 2018-04-17 NOTE — PROGRESS NOTES
Clinic Care Coordination Contact    OUTREACH    Referral Information:  Referral Source: PCP    Primary Diagnosis: Psychosocial    Chief Complaint   Patient presents with     Clinic Care Coordination - Follow-up     Memory Concerns/ Diabetic Management        Universal Utilization: No major concerns  Utilization    Last refreshed: 4/17/2018 12:26 PM:  No Show Count (past year) 1       Last refreshed: 4/17/2018 12:26 PM:  ED visits 0       Last refreshed: 4/17/2018 12:26 PM:  Hospital admissions 0          Current as of: 4/17/2018 12:26 PM         Clinical Concerns:  Current Medical Concerns:  Pt has been working with MTM and Diabetic Educator to help with diabetic management and education. Pt has Alzheimer's disease (reported by pt's spouse and was diagnosed by a provider possibly psychiatry at [facility]). Pt has been diagnosing insulin incorrectly at times. A1C is still within goal at this time. Outreach to pt to discuss. Pt denied any questions or concerns. KEILY CC inquired about pt's spouse Nando and pt gave permission to speak with him. Nando had previously declined MTM services. Reviewed this service with pt's spouse who declined due to payment explained that a letter is sent to pt's with the pt's insurance declining payment however the service is billed to [place] not the patient. Pt's spouse still didn't feel comfortable with MTM making medication changes, should that want to continue PCP will most likely have to have a conversation in person with pt and pt's spouse. Pt's spouse is aware of pt's continued memory decline, but feels that it is still manageable at this time. Discussed possible resources to help in the future including things like lock box for medications, home health aides, nurses, day programs. Pt's spouse reported that if it came to that point she would have to go to the hospital or a home. Discussed available housing available and how Care Coordination can be of assistance. Discussed  planning ahead and the importance of having supports in place. Pt asked KEILY HOWELL to mail out contact information in case he needs help in the future. Pt has an appointment on 4/26 for a recheck of diabetic medications. KEILY HOWELL will review PCP notes to see if any changes occurred with pt's ability to manage medications. Home Care may be a viable option if pt is homebound for OT testing and RN for medication check.  Current Behavioral Concerns: Difficulty managing diabetic regimen due to cognitive impairments    Education Provided to patient: Senior Linkage Line, educated on memory care units of [facility] and LT placement- how they differ, Alzheimer's Association, community involvement; private duty services to assist at home, and clinic care coordination information  Clinical Pathway Name: None  Health Maintenance Reviewed: Due/Overdue     Medication Management:  Pt had worked briefly with MTM and has just recently met with a diabetic educator. Pt seems to be having difficulty taking insulin correctly and may be checking blood sugars more frequently then needed.      Functional Status:  Mobility Status: Independent  Equipment Currently Used at Home: glucometer  Transportation means:: Regular car, Family     Psychosocial:  Current living arrangement:: I live in a private home with spouse  Type of residence:: Private home - stairs  Financial/Insurance: Pt and Pt's spouse deny concerns during this outreach     Resources and Interventions:  Current Resources: Diabetic Education  Advanced Care Plans/Directives on file:: No   Patient/Caregiver understanding: Pt reports understanding and denies any additional questions or concerns at this times. KEILY HOWELL engaged in AIDET communication during encounter.     Future Appointments              In 1 week David Finney MD [facility]           Plan: KEILY HOWELL will mail out information about Care Coordination with contact information and follow-up after appointment  on 4/26.    Jaqui Tobias, [facility]  Clinic Care Coordinator  [phone]  [email or web address]

## 2018-04-26 ENCOUNTER — OFFICE VISIT (OUTPATIENT)
Dept: FAMILY MEDICINE | Facility: CLINIC | Age: 83
End: 2018-04-26
Payer: COMMERCIAL

## 2018-04-26 VITALS
DIASTOLIC BLOOD PRESSURE: 80 MMHG | OXYGEN SATURATION: 92 % | RESPIRATION RATE: 20 BRPM | WEIGHT: 125 LBS | TEMPERATURE: 97.2 F | SYSTOLIC BLOOD PRESSURE: 130 MMHG | HEART RATE: 82 BPM | BODY MASS INDEX: 22.15 KG/M2 | HEIGHT: 63 IN

## 2018-04-26 DIAGNOSIS — E03.9 HYPOTHYROIDISM, UNSPECIFIED TYPE: ICD-10-CM

## 2018-04-26 DIAGNOSIS — N18.30 CKD (CHRONIC KIDNEY DISEASE) STAGE 3, GFR 30-59 ML/MIN (H): ICD-10-CM

## 2018-04-26 DIAGNOSIS — I10 ESSENTIAL HYPERTENSION WITH GOAL BLOOD PRESSURE LESS THAN 140/90: ICD-10-CM

## 2018-04-26 DIAGNOSIS — E10.22 TYPE 1 DIABETES MELLITUS WITH DIABETIC CHRONIC KIDNEY DISEASE, UNSPECIFIED CKD STAGE (H): Primary | ICD-10-CM

## 2018-04-26 DIAGNOSIS — J42 CHRONIC BRONCHITIS, UNSPECIFIED CHRONIC BRONCHITIS TYPE (H): ICD-10-CM

## 2018-04-26 LAB
HBA1C MFR BLD: 7.7 % (ref 0–5.6)
T4 FREE SERPL-MCNC: 0.85 NG/DL (ref 0.76–1.46)
TSH SERPL DL<=0.005 MIU/L-ACNC: 25.92 MU/L (ref 0.4–4)

## 2018-04-26 PROCEDURE — 99214 OFFICE O/P EST MOD 30 MIN: CPT | Performed by: FAMILY MEDICINE

## 2018-04-26 PROCEDURE — 83036 HEMOGLOBIN GLYCOSYLATED A1C: CPT | Performed by: FAMILY MEDICINE

## 2018-04-26 PROCEDURE — 84439 ASSAY OF FREE THYROXINE: CPT | Performed by: FAMILY MEDICINE

## 2018-04-26 PROCEDURE — 84443 ASSAY THYROID STIM HORMONE: CPT | Performed by: FAMILY MEDICINE

## 2018-04-26 PROCEDURE — 36415 COLL VENOUS BLD VENIPUNCTURE: CPT | Performed by: FAMILY MEDICINE

## 2018-04-26 RX ORDER — AMLODIPINE BESYLATE 2.5 MG/1
TABLET ORAL
Qty: 180 TABLET | Refills: 1 | Status: SHIPPED | OUTPATIENT
Start: 2018-04-26 | End: 2018-01-01

## 2018-04-26 RX ORDER — METOPROLOL SUCCINATE 50 MG/1
50 TABLET, EXTENDED RELEASE ORAL DAILY
Qty: 90 TABLET | Refills: 1 | Status: SHIPPED | OUTPATIENT
Start: 2018-04-26 | End: 2018-01-01

## 2018-04-26 RX ORDER — LOSARTAN POTASSIUM 25 MG/1
12.5 TABLET ORAL DAILY
Qty: 45 TABLET | Refills: 1 | Status: SHIPPED | OUTPATIENT
Start: 2018-04-26 | End: 2018-01-01

## 2018-04-26 NOTE — NURSING NOTE
"Chief Complaint   Patient presents with     Diabetes     Health Maintenance     phq9       Initial /80  Pulse 82  Temp 97.2  F (36.2  C) (Oral)  Resp 20  Ht 5' 3\" (1.6 m)  Wt 125 lb (56.7 kg)  SpO2 92%  BMI 22.14 kg/m2 Estimated body mass index is 22.14 kg/(m^2) as calculated from the following:    Height as of this encounter: 5' 3\" (1.6 m).    Weight as of this encounter: 125 lb (56.7 kg).    Philomena Marie CMA    "

## 2018-04-26 NOTE — PROGRESS NOTES
SUBJECTIVE:  Claudia Presley, a 82 year old female scheduled an appointment to discuss the following issues:    Follow-up depression, memory difficulties, dm - insulin controlled, hypothyroid and cellulitis/dermatitis.  Taking synthroid daily. Here with . Sleep ok. Breathing stable. No albuterol needed much. Appetite ok. Weight down.   No protein shakes. No nausea, vomiting or diarrhea. No black or bloody stools. No urine changes or hematuria.  Emotionally doing better now spring here - helpful.   Lowest sugar 32 in past couple months.  with glucogon pen at home.   Working with DM ED. Trying to keep sugars 150-200.   humalog to 11 TID. Toujeo - 10 units. Running cold.   Past Medical History:   Diagnosis Date     COPD (chronic obstructive pulmonary disease) (H)      Depression      Diverticulosis of colon 2010    seen on colonoscopy     Hypertension      Hypothyroidism      Osteopenia      Osteoporosis      Peripheral edema      Spinal stenosis      Type I (juvenile type) diabetes mellitus without mention of complication, not stated as uncontrolled        Past Surgical History:   Procedure Laterality Date     BREAST LUMPECTOMY, RT/LT      Breast Lumpectomy RT/LT     CATARACT IOL, RT/LT       PHACOEMULSIFICATION WITH STANDARD INTRAOCULAR LENS IMPLANT  3/2007; 3/2007    left eye; right eye (STS)     TONSILLECTOMY         Family History   Problem Relation Age of Onset     Cancer - colorectal Father      PASSED AWAY FROM COLON CA     Alzheimer Disease Brother      PICKS DISEASE     Alzheimer Disease Sister      ON ARICEPT     Depression Sister      POST PARTUM     Respiratory Brother      COPD     Arthritis Sister      OSTEOPOROSIS Sister      Thyroid Disease Sister      Alzheimer Disease Mother      Breast Cancer Maternal Aunt        Social History   Substance Use Topics     Smoking status: Former Smoker     Types: Cigarettes     Quit date: 1/1/1998     Smokeless tobacco: Never Used      Comment: 1998  "    Alcohol use No       ROS:  All other ROS negative    OBJECTIVE:  There were no vitals taken for this visit.   /80  Pulse 82  Temp 97.2  F (36.2  C) (Oral)  Resp 20  Ht 5' 3\" (1.6 m)  Wt 125 lb (56.7 kg)  SpO2 92%  BMI 22.14 kg/m2    EXAM:  GENERAL APPEARANCE: healthy, alert and no distress  EYES: EOMI,  PERRL  HENT: ear canals and TM's normal and nose and mouth without ulcers or lesions  NECK: no adenopathy, no asymmetry, masses, or scars and thyroid normal to palpation  RESP: lungs clear to auscultation - no rales, rhonchi or wheezes  CV: regular rates and rhythm, normal S1 S2, no S3 or S4 and no murmur, click or rub -  ABDOMEN:  soft, nontender, no HSM or masses and bowel sounds normal  MS: extremities normal- no gross deformities noted, no evidence of inflammation in joints, FROM in all extremities.  NEURO: Normal strength and tone, sensory exam grossly normal,  speech normal, somewhat poor historian.  NEURO: good pulses/sensation bilateral feet  PSYCH: mentation appears normal and affect normal/bright    ASSESSMENT / PLAN:  (E10.22,  N18.9) Type 1 diabetes mellitus with diabetic chronic kidney disease, unspecified CKD stage (H)  (primary encounter diagnosis)  Comment: stable currently  Plan: HEMOGLOBIN A1C, insulin glargine U-300 (TOUJEO)        300 UNIT/ML injection, insulin lispro (HUMALOG         KWIKPEN) 100 UNIT/ML injection        With memory issues needs to run higher than lower. glucogan pen at home and will continue closely monitor. Discussed nursing home/home health help if more hypoglycemia issues. Be consistent with meal times and keep injections simple. Expected course and warning signs reviewed.     (E03.9) Hypothyroidism, unspecified type  Plan: TSH with free T4 reflex        Adjust if needed.     (I10) Essential hypertension with goal blood pressure less than 140/90  Comment: stable  Plan: amLODIPine (NORVASC) 2.5 MG tablet, metoprolol         succinate (TOPROL-XL) 50 MG 24 hr " tablet        Continue meds. Drop clonodine. Reveiwed risks and side effects of medication  Exercise.     (J42) Chronic bronchitis, unspecified chronic bronchitis type (H)  Comment: stable  Plan: continue MDI's.     (N18.3) CKD (chronic kidney disease) stage 3, GFR 30-59 ml/min  Comment: stable in past  Plan: losartan (COZAAR) 25 MG tablet        More water. Recheck in 6 months      David Finney

## 2018-04-26 NOTE — MR AVS SNAPSHOT
"              After Visit Summary   4/26/2018    Claudia Presley    MRN: [id number]           Patient Information     Date Of Birth          1935        Visit Information        Provider Department      4/26/2018 10:40 AM David Finney MD [facility]        Today's Diagnoses     Type 1 diabetes mellitus with diabetic chronic kidney disease, unspecified CKD stage (H)    -  1    Hypothyroidism, unspecified type        Essential hypertension with goal blood pressure less than 140/90        Chronic bronchitis, unspecified chronic bronchitis type (H)        CKD (chronic kidney disease) stage 3, GFR 30-59 ml/min           Follow-ups after your visit        Who to contact     If you have questions or need follow up information about today's clinic visit or your schedule please contact [facility] directly at [phone].  Normal or non-critical lab and imaging results will be communicated to you by MyChart, letter or phone within 4 business days after the clinic has received the results. If you do not hear from us within 7 days, please contact the clinic through MyChart or phone. If you have a critical or abnormal lab result, we will notify you by phone as soon as possible.  Submit refill requests through [employer] or call your pharmacy and they will forward the refill request to us. Please allow 3 business days for your refill to be completed.          Additional Information About Your Visit        MyChart Information     [employer] lets you send messages to your doctor, view your test results, renew your prescriptions, schedule appointments and more. To sign up, go to [email or web address] . Click on \"Log in\" on the left side of the screen, which will take you to the Welcome page. Then click on \"Sign up Now\" on the right side of the page.     You will be asked to enter the access code listed below, as well as some personal information. Please follow the directions to create your " "username and password.     Your access code is: [email or web address]  Expires: 5/10/2018  1:51 PM     Your access code will  in 90 days. If you need help or a new code, please call your [facility] or [phone].        Care EveryWhere ID     This is your Care EveryWhere ID. This could be used by other organizations to access your [place] medical records  [id number]        Your Vitals Were     Pulse Temperature Respirations Height Pulse Oximetry BMI (Body Mass Index)    82 97.2  F (36.2  C) (Oral) 20 5' 3\" (1.6 m) 92% 22.14 kg/m2       Blood Pressure from Last 3 Encounters:   18 130/80   18 99/67   10/23/17 135/69    Weight from Last 3 Encounters:   18 125 lb (56.7 kg)   18 129 lb (58.5 kg)   10/23/17 135 lb (61.2 kg)              We Performed the Following     HEMOGLOBIN A1C     TSH with free T4 reflex          Today's Medication Changes          These changes are accurate as of 18 11:48 AM.  If you have any questions, ask your nurse or doctor.               These medicines have changed or have updated prescriptions.        Dose/Directions    amLODIPine 2.5 MG tablet   Commonly known as:  NORVASC   This may have changed:  additional instructions   Used for:  Essential hypertension with goal blood pressure less than 140/90        TAKE 2 TABLETS BY MOUTH DAILY for blood pressure Pharmacy ok to hold prescription until due   Quantity:  180 tablet   Refills:  1       budesonide-formoterol 160-4.5 MCG/ACT Inhaler   Commonly known as:  SYMBICORT   This may have changed:    - when to take this  - reasons to take this  - additional instructions   Used for:  Chronic bronchitis, unspecified chronic bronchitis type (H)        Dose:  1 puff   Inhale 1 puff into the lungs 2 times daily Daily inhaler for copd Pharmacy ok to hold prescription until due   Quantity:  3 Inhaler   Refills:  1       HumaLOG KWIKpen 100 UNIT/ML injection   This may have changed:    - additional instructions  - Another " medication with the same name was removed. Continue taking this medication, and follow the directions you see here.   Used for:  Type 1 diabetes mellitus with diabetic chronic kidney disease, unspecified CKD stage (H)   Generic drug:  insulin lispro        INJECT 11 UNITS UNDER THE SKIN BEFORE BREAKFAST, LUNCH, AND DINNER   Quantity:  1 mL   Refills:  0       insulin glargine U-300 300 UNIT/ML injection   Commonly known as:  TOUJEO   This may have changed:  how much to take   Used for:  Type 1 diabetes mellitus with diabetic chronic kidney disease, unspecified CKD stage (H)        Dose:  10 Units   Inject 10 Units Subcutaneous every morning   Quantity:  1 mL   Refills:  0       losartan 25 MG tablet   Commonly known as:  COZAAR   This may have changed:  additional instructions   Used for:  CKD (chronic kidney disease) stage 3, GFR 30-59 ml/min        Dose:  12.5 mg   Take 0.5 tablets (12.5 mg) by mouth daily For blood pressure with breakfast Pharmacy ok to hold prescription until due   Quantity:  45 tablet   Refills:  1            Where to get your medicines      These medications were sent to [id number] - [place], [place] - [address]  [address] [address] [address]     Phone:  [phone]     amLODIPine 2.5 MG tablet    losartan 25 MG tablet    metoprolol succinate 50 MG 24 hr tablet                Primary Care Provider Office Phone # Fax #    David Finney [phone] [address] [facility] [address]        Equal Access to Services     [facility]: Hadii aad ku hadasho Sotessy, waaxda luqadaha, qaybta kaalmada adeegyada, micky barrera. So [facility] [phone].    ATENCIÓN: Si habla español, tiene a herndon disposición servicios gratuitos de asistencia lingüística. Llame al [phone].    We comply with applicable federal civil rights laws and [place] laws. We do not discriminate on the basis of race, color, national origin,  age, disability, sex, sexual orientation, or gender identity.            Thank you!     Thank you for choosing [facility]  for your [facility]. Our goal is always to provide you with excellent care. Hearing back from our patients is one way we can continue to improve our services. Please take a few minutes to complete the written survey that you may receive in the mail after your visit with us. Thank you!             Your Updated Medication List - Protect others around you: Learn how to safely use, store and throw away your medicines at www.disposemymeds.org.          This list is accurate as of 4/26/18 11:48 AM.  Always use your most recent med list.                   Brand Name Dispense Instructions for use Diagnosis    albuterol 108 (90 Base) MCG/ACT Inhaler    PROAIR HFA/PROVENTIL HFA/VENTOLIN HFA     Inhale 2 puffs into the lungs every 6 hours        amLODIPine 2.5 MG tablet    NORVASC    180 tablet    TAKE 2 TABLETS BY MOUTH DAILY for blood pressure Pharmacy ok to hold prescription until due    Essential hypertension with goal blood pressure less than 140/90       aspirin 81 MG tablet      1 TABLET DAILY        blood glucose lancets standard    no brand specified    100 each    Use to test blood sugar 4 times daily or as directed. 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 Dispense One Touch Ultra test strips or per patient/insurance preference    Type 1 diabetes mellitus with diabetic chronic kidney disease, unspecified CKD stage (H)       budesonide-formoterol  160-4.5 MCG/ACT Inhaler    SYMBICORT    3 Inhaler    Inhale 1 puff into the lungs 2 times daily Daily inhaler for copd Pharmacy ok to hold prescription until due    Chronic bronchitis, unspecified chronic bronchitis type (H)       calcium-vitamin D 500-125 MG-UNIT Tabs      Take 1 tablet by mouth daily        cloNIDine 0.1 MG tablet    CATAPRES    180 tablet    TAKE 1 TABLET BY MOUTH 2 TIMES DAILY FOR BLOOD PRESSURE    Essential hypertension with goal blood pressure less than 140/90       cyanocobalamin 1000 MCG tablet    vitamin  B-12     Take 1,000 mcg by mouth daily        FUROSEMIDE PO      Take 20 mg by mouth daily        glucagon 1 MG kit    GLUCAGON EMERGENCY    1 mg    Inject 1 mg into the muscle once for 1 dose    Type 1 diabetes mellitus with diabetic chronic kidney disease, unspecified CKD stage (H)       HumaLOG KWIKpen 100 UNIT/ML injection   Generic drug:  insulin lispro     1 mL    INJECT 11 UNITS UNDER THE SKIN BEFORE BREAKFAST, LUNCH, AND DINNER    Type 1 diabetes mellitus with diabetic chronic kidney disease, unspecified CKD stage (H)       insulin glargine U-300 300 UNIT/ML injection    TOUJEO    1 mL    Inject 10 Units Subcutaneous every morning    Type 1 diabetes mellitus with diabetic chronic kidney disease, unspecified CKD stage (H)       * insulin pen needle 32G X 4 MM    BD RACHEL U/F    400 each    Use four times daily.    Type 1 diabetes, HbA1c goal < 8% (H)       * BD RACHEL U/F 32G X 4 MM   Generic drug:  insulin pen needle     200 each    use 4 times daily    Type 1 diabetes, HbA1c goal < 8% (H)       levothyroxine 125 MCG tablet    SYNTHROID/LEVOTHROID    90 tablet    Take 1 tablet (125 mcg) by mouth daily. This is a higher dosage for thyroid for energy/keep warmer.    Hypothyroidism, unspecified type       losartan 25 MG tablet    COZAAR    45 tablet    Take 0.5 tablets (12.5 mg) by mouth daily For blood pressure with breakfast Pharmacy ok to hold prescription until due    CKD (chronic  kidney disease) stage 3, GFR 30-59 ml/min       metoprolol succinate 50 MG 24 hr tablet    TOPROL-XL    90 tablet    Take 1 tablet (50 mg) by mouth daily For blood pressure and lowers pulse. Pharmacy ok to hold prescription until due    Essential hypertension with goal blood pressure less than 140/90       order for DME     3 Units    Equipment being ordered: Freestyle Cesar CGM (continuous glucose monitoring system) Will need 1 reader device and 3 sensor discs.  This is for one month supply. When refills she will need 3 sensor discs each month, or 9 sensors for 3 months supply    Diabetes mellitus, type 2 (H)       sertraline 50 MG tablet    ZOLOFT    90 tablet    Take 1 tablet (50 mg) by mouth daily For depression/anxiety (this is double dosage from before).    Major depressive disorder, recurrent episode, moderate (H)       simvastatin 20 MG tablet    ZOCOR    90 tablet    TAKE ONE TABLET BY MOUTH ONCE DAILY. AT BEDTIME FOR CHOLESTEROL    Hyperlipidemia LDL goal <100       * Notice:  This list has 2 medication(s) that are the same as other medications prescribed for you. Read the directions carefully, and ask your doctor or other care provider to review them with you.

## 2018-04-27 RX ORDER — LEVOTHYROXINE SODIUM 137 UG/1
137 TABLET ORAL DAILY
Qty: 90 TABLET | Refills: 1 | Status: SHIPPED | OUTPATIENT
Start: 2018-04-27 | End: 2018-01-01

## 2018-04-29 DIAGNOSIS — E10.9 TYPE 1 DIABETES, HBA1C GOAL < 8% (H): ICD-10-CM

## 2018-04-30 RX ORDER — PEN NEEDLE, DIABETIC 32GX 5/32"
NEEDLE, DISPOSABLE MISCELLANEOUS
Qty: 200 EACH | Refills: 3 | Status: SHIPPED | OUTPATIENT
Start: 2018-04-30 | End: 2018-01-01

## 2018-05-04 ENCOUNTER — PATIENT OUTREACH (OUTPATIENT)
Dept: CARE COORDINATION | Facility: CLINIC | Age: 83
End: 2018-05-04

## 2018-05-04 ASSESSMENT — ACTIVITIES OF DAILY LIVING (ADL): DEPENDENT_IADLS:: MONEY MANAGEMENT;SHOPPING;TRANSPORTATION

## 2018-07-05 DIAGNOSIS — E03.9 HYPOTHYROIDISM, UNSPECIFIED TYPE: ICD-10-CM

## 2018-07-06 RX ORDER — LEVOTHYROXINE SODIUM 125 UG/1
TABLET ORAL
Qty: 90 TABLET | Refills: 0 | OUTPATIENT
Start: 2018-07-06

## 2018-07-06 NOTE — TELEPHONE ENCOUNTER
Request for levothyroxine 125 mcg. Dose was changed to 137 mcg on 4/27/18 and pharmacy should have refills available. Ginger Murguia RN

## 2018-07-30 NOTE — TELEPHONE ENCOUNTER
I'm ok with this amount if insurance will cover. Ok for 3 months supply x1 year. Otherwise ok for max amount per insurance. Thanks. David Finney MD

## 2018-07-30 NOTE — TELEPHONE ENCOUNTER
Prescription is sent to pharmacy.   Patient/parent is informed of MD note below, as it is written. Verbalized good understanding.

## 2018-07-30 NOTE — TELEPHONE ENCOUNTER
Pt  left message on triage vm stating pt's test strip prescription states she tests 4 times daily.  He states pt tests 10-12 times daily.  He would like prescription to reflect that with dispense amount of 300.    Should pt be testing that much?  Philomena LAUGHLINN, RN

## 2018-08-22 NOTE — TELEPHONE ENCOUNTER
Pt  states pt had been getting humolog for 3 month supply for $285 and today when they went to pick it up it was $385 for three month supply.      I called pharmacy and pt is now in the donut hole and will be paying more until limit hit.     They can call there insurance company for more information.    Nando informed of this information.  Philomena LAUGHLINN, RN

## 2018-08-23 NOTE — TELEPHONE ENCOUNTER
called and left phone number requesting number and name of person at [facility] we previously spoke with.  I looked back at message from 1/25/18 and he had called me with person already on the phone so I do not have name or number for them.  Nando advised of this information.  Advised he could call number on back of insurance card and someone could explain more about the deductibles and donut hole.  Nando verbalized understanding.  Philomena Collins BSN, RN

## 2018-08-31 NOTE — TELEPHONE ENCOUNTER
FYI~ Once I received the complete income documents from [employer], I find they are over income for  assistance programs this year.    Jennifer Herbert  Prescription

## 2018-09-20 NOTE — TELEPHONE ENCOUNTER
Faxed RX to [facility] in [place]. Called and informed patient's .Nadia Jc MA/BRIDGETT

## 2018-09-20 NOTE — TELEPHONE ENCOUNTER
"ED/Discharge Protocol    \"Hi, my name is Philomena Collins, a registered nurse, and I am calling on behalf of Dr. Dr. Finney 's office at [place].  I am calling to follow up and see how things are going for you after your recent visit.\"    \"I see that you were in the (ER/UC/IP) on 9/16/18.    How are you doing now that you are home?\"     pt  states she went home from hospital yesterday.  She is on oxygen now.  She was dx with a  Broken vertebrae after a fall. She is having a lot of pain and was not sent home on pain pills.        Discharge Instructions    \"Let's review your discharge instructions.  What is/are the follow-up recommendations?  Pt.  Response: pt has an appointment for her diabetes next Tuesday    \"Were you instructed to make a follow-up appointment?\"  Pt.  Response:  Offered appointment today and he declined, he states it would be almost impossible to get her in the truck with the oxygen right now.        Call Summary    \"Do you have any questions or concerns about your condition or care plan at the moment?\"    Yes, Pt  states pt was not sent home with pain medication.  Pt  asking if Dr. Finney could do an prescription for pain pills for pt.  Philomena Collins BSN, RN      "

## 2018-09-25 NOTE — PROGRESS NOTES
SUBJECTIVE:   Claudia Presley is a 83 year old female who presents to clinic today for the following health issues:      Follow-up hospitalization for fall. L1 fracture -seen neurosurgery- no brace. hgba1c =7.3. Renal panel ok. Cbc/echo and tropinon ok. Urine ok. Ct head/neck and xray shoulder ok.   Going to see neurosurgery in 6 weeks and brace for 3 months. No pain into legs or weakness. Did p.t. -  trying to do exercise. Appetite hit/miss. Blood sugars ok at home recently.   Drinking a little more orange juice.    Breathing overall ok. No cough. No fevers or chills. No urine changes or hematuria. Emotionally doing ok. No feet changes. Eye exam one year ago.  glaucon pen.   Per discharge summary:  BRIEF HOSPITAL COURSE: This 83 y.o. female who presented to the emergency department for evaluation after a fall. Patient reported she fell on the day of admission in her kitchen. The patient was unsure if she hit her head but did report some head pain. She was also unsure why she fell. EMS reports she had low glucose at the scene. She reported back pain and some right shoulder pain. No further complaints or concerns are voiced at admission. Complete evaluation revealed she had a a spine fracture of L1. Neurosurgery was consulted and recommended conservative treatment with a hyperextension brace. This is to be worn at all times when she is out of bed. She was provided with PT and OT and their recommendation was further services at a short term rehabilitation. The patient and her  decided she should go home. During her stay she required O2 at 2 L per nasal canula to maintain her oxygen saturations > 88%. She remained hemodynamically and neurologically stable.          Hospital Follow-up Visit:    Hospital/Nursing Home/IP Rehab Facility: [facility]  Date of Admission: 09-  Date of Discharge: 09-  Reason(s) for Admission: she does not remember             Problems taking medications  regularly:  None       Medication changes since discharge: None       Problems adhering to non-medication therapy:  None    Summary of hospitalization:  CareEverywhere information obtained and reviewed  Diagnostic Tests/Treatments reviewed.  Follow up needed: none  Other Healthcare Providers Involved in Patient s Care:         None  Update since discharge: stable.     Post Discharge Medication Reconciliation: discharge medications reconciled and changed, per note/orders (see AVS).  Plan of care communicated with patient and family     Coding guidelines for this visit:  Type of Medical   Decision Making Face-to-Face Visit       within 7 Days of discharge Face-to-Face Visit        within 14 days of discharge   Moderate Complexity [id number]   High Complexity [id number]              PROBLEMS TO ADD ON...    Problem list and histories reviewed & adjusted, as indicated.  Additional history: as documented    Patient Active Problem List   Diagnosis     Hypothyroidism     HYPERLIPIDEMIA LDL GOAL <100     Encounter for counseling     Hypertension goal BP (blood pressure) < 140/90     Moderate major depression (H)     Pseudophakia, ou     CAD (coronary artery disease)     Type 1 diabetes, HbA1c goal < 8% (H)     COPD (chronic obstructive pulmonary disease) (H)     Spinal stenosis     Diverticulosis of colon     Advanced directives, counseling/discussion     CKD (chronic kidney disease) stage 3, GFR 30-59 ml/min     Peripheral edema     Osteoporosis     Personal history of smoking     BPPV (benign paroxysmal positional vertigo)     ABMD (anterior basement membrane dystrophy), ou     Hypothyroidism, unspecified hypothyroidism type     Memory loss     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)     Major depressive disorder, recurrent episode, mild (H)     Chronic bronchitis, unspecified chronic bronchitis type (H)     Essential hypertension with goal blood pressure less than 140/90     Type 1 diabetes  mellitus with diabetic chronic kidney disease, unspecified CKD stage (H)     Hypothyroidism, unspecified type     Macular drusen, bilateral     Posterior vitreous detachment, bilateral     Major depressive disorder, recurrent episode, moderate (H)     Dermatitis     Past Surgical History:   Procedure Laterality Date     BREAST LUMPECTOMY, RT/LT      Breast Lumpectomy RT/LT     CATARACT IOL, RT/LT       PHACOEMULSIFICATION WITH STANDARD INTRAOCULAR LENS IMPLANT  3/2007; 3/2007    left eye; right eye (STS)     TONSILLECTOMY         Social History   Substance Use Topics     Smoking status: Former Smoker     Types: Cigarettes     Quit date: 1/1/1998     Smokeless tobacco: Never Used      Comment: 1998     Alcohol use No     Family History   Problem Relation Age of Onset     Cancer - colorectal Father      PASSED AWAY FROM COLON CA     Alzheimer Disease Brother      PICKS DISEASE     Alzheimer Disease Sister      ON ARICEPT     Depression Sister      POST PARTUM     Respiratory Brother      COPD     Arthritis Sister      Osteoporosis Sister      Thyroid Disease Sister      Alzheimer Disease Mother      Breast Cancer Maternal Aunt            Reviewed and updated as needed this visit by clinical staff  Allergies       Reviewed and updated as needed this visit by Provider         ROS:  All other ROS negative    OBJECTIVE:     /77  Pulse 96  Temp 97  F (36.1  C) (Oral)  Resp 20  Wt 126 lb (57.2 kg)  SpO2 90%  BMI 22.32 kg/m2  Body mass index is 22.32 kg/(m^2).  GENERAL: healthy, alert and no distress  EYES: Eyes grossly normal to inspection, PERRL and conjunctivae and sclerae normal  HENT: ear canals and TM's normal, nose and mouth without ulcers or lesions  NECK: no adenopathy, no asymmetry, masses, or scars and thyroid normal to palpation  RESP: lungs clear to auscultation - no rales, rhonchi or wheezes  CV: regular rate and rhythm, normal S1 S2, no S3 or S4, no murmur, click or rub, no peripheral edema and  peripheral pulses strong  ABDOMEN: soft, nontender, no hepatosplenomegaly, no masses and bowel sounds normal  MS: in back brace. Full RANGE OF MOTION all ext and no edema.   PSYCH: somewhat poor historian, affect normal/bright  PSYCH: mildly anxious  }    ASSESSMENT/PLAN:     ASSESSMENT / PLAN:  (R73.09) Labile blood glucose  (primary encounter diagnosis)  Plan: PAF COMPLETED, insulin pen needle (BD RACHEL U/F)        32G X 4 MM, blood glucose (NO BRAND SPECIFIED)         lancets standard        Advised to start thinking about possibly assisted living or senior high risk in future - they will consider. They are again refusing home cares/. Had glucogon pen at home and will closely monitor. Advised  about looking for a safety GPS watch for her or safety/911 button. Recheck in 3 months. Advised to let sugars always run too high then low. Call/email with questions/concerns      (E10.22,  N18.9) Type 1 diabetes mellitus with diabetic chronic kidney disease, unspecified CKD stage (H)  Comment: hgba1c ok but concerns about low sugars  Plan: PAF COMPLETED, blood glucose monitoring (NO         BRAND SPECIFIED) test strip, insulin pen needle        (BD RACHEL U/F) 32G X 4 MM, blood glucose (NO         BRAND SPECIFIED) lancets standard        Continue closely monitor - prescription more often checking giving. Continue current insulin regiment. Seen dm ed several times in past    (J42) Chronic bronchitis, unspecified chronic bronchitis type (H)  Comment: stable  Plan: PAF COMPLETED, budesonide-formoterol         (SYMBICORT) 160-4.5 MCG/ACT Inhaler        Not using oxygen - [facility]    (E03.9) Hypothyroidism, unspecified type  Comment: stable  Plan: PAF COMPLETED, levothyroxine         (SYNTHROID/LEVOTHROID) 137 MCG tablet              (I10) Essential hypertension with goal blood pressure less than 140/90  Comment: stalbe  Plan: PAF COMPLETED, metoprolol succinate (TOPROL-XL)        50 MG  24 hr tablet        Chest pain or shortness of breath to er.    (F33.1) Major depressive disorder, recurrent episode, moderate (H)  Comment: stable  Plan: PAF COMPLETED, sertraline (ZOLOFT) 50 MG tablet        Is SUICIAL IDEATION OR HOMOCIDAL IDEATION OR HERMELINDA TO ER     (S32.010S) Closed compression fracture of first lumbar vertebra, sequela  Comment: stable  Plan: per neurosurgery. Continue brace and tylenol#3.         David Finney MD  [facility]

## 2018-09-25 NOTE — NURSING NOTE
"Chief Complaint   Patient presents with     Diabetes     Health Maintenance       Initial /77  Pulse 96  Temp 97  F (36.1  C) (Oral)  Resp 20  Wt 126 lb (57.2 kg)  SpO2 90%  BMI 22.32 kg/m2 Estimated body mass index is 22.32 kg/(m^2) as calculated from the following:    Height as of 4/26/18: 5' 3\" (1.6 m).    Weight as of this encounter: 126 lb (57.2 kg).    Philomena Marie CMA    "

## 2018-09-25 NOTE — MR AVS SNAPSHOT
After Visit Summary   9/25/2018    Claudia Presley    MRN: [id number]           Patient Information     Date Of Birth          1935        Visit Information        Provider Department      9/25/2018 5:10 PM David Finney MD [facility]        Today's Diagnoses     Labile blood glucose    -  1    Type 1 diabetes mellitus with diabetic chronic kidney disease, unspecified CKD stage (H)        Chronic bronchitis, unspecified chronic bronchitis type (H)        Hypothyroidism, unspecified type        CKD (chronic kidney disease) stage 3, GFR 30-59 ml/min        Essential hypertension with goal blood pressure less than 140/90        Major depressive disorder, recurrent episode, moderate (H)        Closed compression fracture of first lumbar vertebra, sequela           Follow-ups after your visit        Who to contact     If you have questions or need follow up information about today's clinic visit or your schedule please contact [facility] directly at [phone].  Normal or non-critical lab and imaging results will be communicated to you by MyChart, letter or phone within 4 business days after the clinic has received the results. If you do not hear from us within 7 days, please contact the clinic through MyChart or phone. If you have a critical or abnormal lab result, we will notify you by phone as soon as possible.  Submit refill requests through [employer] or call your pharmacy and they will forward the refill request to us. Please allow 3 business days for your refill to be completed.          Additional Information About Your Visit        Care EveryWhere ID     This is your Care EveryWhere ID. This could be used by other organizations to access your [facility] medical records  [id number]        Your Vitals Were     Pulse Temperature Respirations Pulse Oximetry BMI (Body Mass Index)       96 97  F (36.1  C) (Oral) 20 90% 22.32 kg/m2        Blood Pressure from Last 3  Encounters:   09/25/18 105/77   04/26/18 130/80   01/04/18 99/67    Weight from Last 3 Encounters:   09/25/18 126 lb (57.2 kg)   04/26/18 125 lb (56.7 kg)   01/04/18 129 lb (58.5 kg)              We Performed the Following     PAF COMPLETED          Today's Medication Changes          These changes are accurate as of 9/25/18  9:27 PM.  If you have any questions, ask your nurse or doctor.               These medicines have changed or have updated prescriptions.        Dose/Directions    * BD RACHEL U/F 32G X 4 MM   This may have changed:  Another medication with the same name was changed. Make sure you understand how and when to take each.   Used for:  Type 1 diabetes, HbA1c goal < 8% (H)   Generic drug:  insulin pen needle   Changed by:  David Finney MD        use 4 times daily   Quantity:  200 each   Refills:  3       * insulin pen needle 32G X 4 MM   Commonly known as:  BD RACHEL U/F   This may have changed:  additional instructions   Used for:  Type 1 diabetes mellitus with diabetic chronic kidney disease, unspecified CKD stage (H), Labile blood glucose   Changed by:  David Finney MD        Use ten times daily.   Quantity:  900 each   Refills:  3       blood glucose lancets standard   Commonly known as:  no brand specified   This may have changed:  additional instructions   Used for:  Type 1 diabetes mellitus with diabetic chronic kidney disease, unspecified CKD stage (H), Labile blood glucose   Changed by:  David Finney MD        Use to test blood sugar 10 times daily or as directed.  per patient's insurance   Quantity:  900 each   Refills:  3       budesonide-formoterol 160-4.5 MCG/ACT Inhaler   Commonly known as:  SYMBICORT   This may have changed:    - when to take this  - reasons to take this  - additional instructions   Used for:  Chronic bronchitis, unspecified chronic bronchitis type (H)        Dose:  1 puff   Inhale 1 puff into the lungs 2 times daily Daily inhaler for copd Pharmacy ok to  hold prescription until due   Quantity:  3 Inhaler   Refills:  1       levothyroxine 137 MCG tablet   Commonly known as:  SYNTHROID/LEVOTHROID   This may have changed:  Another medication with the same name was removed. Continue taking this medication, and follow the directions you see here.   Used for:  Hypothyroidism, unspecified type   Changed by:  David Finney MD        Dose:  137 mcg   Take 1 tablet (137 mcg) by mouth daily This is high dosage for thyroid   Quantity:  90 tablet   Refills:  1       * Notice:  This list has 2 medication(s) that are the same as other medications prescribed for you. Read the directions carefully, and ask your doctor or other care provider to review them with you.         Where to get your medicines      These medications were sent to [id number] - [place], [place] - [address]  [address] [address]     Phone:  [phone]     blood glucose lancets standard    blood glucose monitoring test strip    budesonide-formoterol 160-4.5 MCG/ACT Inhaler    insulin pen needle 32G X 4 MM    levothyroxine 137 MCG tablet    losartan 25 MG tablet    metoprolol succinate 50 MG 24 hr tablet    sertraline 50 MG tablet                Primary Care Provider Office Phone # Fax #    David Finney [phone] [address] [address] [address]        Equal Access to Services     KIRAN SHERIFF AH: Hadii matisa ku hadasho Soomaali, waaxda luqadaha, qaybta kaalmada adeegyada, waxay idiin hayaan jamey khconnie barrera. So [facility] [phone].    ATENCIÓN: Si habla español, tiene a herndon disposición servicios gratuitos de asistencia lingüística. Llame al [phone].    We comply with applicable federal civil rights laws and [place] laws. We do not discriminate on the basis of race, color, national origin, age, disability, sex, sexual orientation, or gender identity.            Thank you!     Thank you for choosing [facility]  for  your care. Our goal is always to provide you with excellent care. Hearing back from our patients is one way we can continue to improve our services. Please take a few minutes to complete the written survey that you may receive in the mail after your visit with us. Thank you!             Your Updated Medication List - Protect others around you: Learn how to safely use, store and throw away your medicines at www.disposemymeds.org.          This list is accurate as of 9/25/18  9:27 PM.  Always use your most recent med list.                   Brand Name Dispense Instructions for use Diagnosis    acetaminophen-codeine 300-30 MG per tablet    TYLENOL #3    14 tablet    1/2 to 1 tab every 8 hours as needed for pain    Arthralgia of hip, unspecified laterality       albuterol 108 (90 Base) MCG/ACT inhaler    PROAIR HFA/PROVENTIL HFA/VENTOLIN HFA     Inhale 2 puffs into the lungs every 6 hours        amLODIPine 2.5 MG tablet    NORVASC    180 tablet    TAKE 2 TABLETS BY MOUTH DAILY for blood pressure Pharmacy ok to hold prescription until due    Essential hypertension with goal blood pressure less than 140/90       aspirin 81 MG tablet      1 TABLET DAILY        * BD RACHEL U/F 32G X 4 MM   Generic drug:  insulin pen needle     200 each    use 4 times daily    Type 1 diabetes, HbA1c goal < 8% (H)       * insulin pen needle 32G X 4 MM    BD RACHEL U/F    900 each    Use ten times daily.    Type 1 diabetes mellitus with diabetic chronic kidney disease, unspecified CKD stage (H), Labile blood glucose       blood glucose lancets standard    no brand specified    900 each    Use to test blood sugar 10 times daily or as directed.  per patient's insurance    Type 1 diabetes mellitus with diabetic chronic kidney disease, unspecified CKD stage (H), Labile blood glucose       blood glucose monitoring meter device kit    no brand specified    1 kit    Use to test blood sugar 4 times daily or as directed. Dispense One Touch Ultra or per  patient's insurance    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)       blood glucose monitoring test strip    no brand specified    900 strip    In Vitro route 4 times daily. Dispense One Touch Ultra test strips or per patient/insurance preference    Type 1 diabetes mellitus with diabetic chronic kidney disease, unspecified CKD stage (H)       budesonide-formoterol 160-4.5 MCG/ACT Inhaler    SYMBICORT    3 Inhaler    Inhale 1 puff into the lungs 2 times daily Daily inhaler for copd Pharmacy ok to hold prescription until due    Chronic bronchitis, unspecified chronic bronchitis type (H)       calcium-vitamin D 500-125 MG-UNIT Tabs      Take 1 tablet by mouth daily        cloNIDine 0.1 MG tablet    CATAPRES    180 tablet    TAKE one TABLET BY MOUTH 2 TIMES DAILY FOR BLOOD PRESSURE.    Essential hypertension with goal blood pressure less than 140/90       cyanocobalamin 1000 MCG tablet    vitamin  B-12     Take 1,000 mcg by mouth daily        * FUROSEMIDE PO      Take 20 mg by mouth daily        * furosemide 20 MG tablet    LASIX    90 tablet    take 1 tablet by mouth every day as needed for blood pressure or leg swelling    Peripheral edema       glucagon 1 MG kit    GLUCAGON EMERGENCY    1 mg    Inject 1 mg into the muscle once for 1 dose    Type 1 diabetes mellitus with diabetic chronic kidney disease, unspecified CKD stage (H)       HumaLOG KWIKpen 100 UNIT/ML injection   Generic drug:  insulin lispro     1 mL    INJECT 11 UNITS UNDER THE SKIN BEFORE BREAKFAST, LUNCH, AND DINNER    Type 1 diabetes mellitus with diabetic chronic kidney disease, unspecified CKD stage (H)       insulin glargine U-300 300 UNIT/ML injection    TOUJEO    1 mL    Inject 10 Units Subcutaneous every morning    Type 1 diabetes mellitus with diabetic chronic kidney disease, unspecified CKD stage (H)       levothyroxine 137 MCG tablet    SYNTHROID/LEVOTHROID    90 tablet    Take 1 tablet (137 mcg) by mouth daily This is  high dosage for thyroid    Hypothyroidism, unspecified type       losartan 25 MG tablet    COZAAR    45 tablet    Take 0.5 tablets (12.5 mg) by mouth daily For blood pressure with breakfast Pharmacy ok to hold prescription until due    CKD (chronic kidney disease) stage 3, GFR 30-59 ml/min       metoprolol succinate 50 MG 24 hr tablet    TOPROL-XL    90 tablet    Take 1 tablet (50 mg) by mouth daily For blood pressure and lowers pulse. Pharmacy ok to hold prescription until due    Essential hypertension with goal blood pressure less than 140/90       order for DME     3 Units    Equipment being ordered: Freestyle Cesar CGM (continuous glucose monitoring system) Will need 1 reader device and 3 sensor discs.  This is for one month supply. When refills she will need 3 sensor discs each month, or 9 sensors for 3 months supply    Diabetes mellitus, type 2 (H)       sertraline 50 MG tablet    ZOLOFT    90 tablet    Take 1 tablet (50 mg) by mouth daily For depression/anxiety (this is double dosage from before).    Major depressive disorder, recurrent episode, moderate (H)       simvastatin 20 MG tablet    ZOCOR    90 tablet    TAKE ONE TABLET BY MOUTH ONCE DAILY. AT BEDTIME FOR CHOLESTEROL    Hyperlipidemia LDL goal <100       * Notice:  This list has 4 medication(s) that are the same as other medications prescribed for you. Read the directions carefully, and ask your doctor or other care provider to review them with you.

## 2018-09-26 NOTE — TELEPHONE ENCOUNTER
Prescription's for lancing device and test strips sent to pharmacy.  Pt  notified these were sent to pharmacy.  Notified  that tylenol #3 prescription was faxed to the pharmacy.  Asked  for [facility] home oxygen phone number, I called them for fax number for discharge order.  Ph. [phone]  Fax [phone].  Signed discharge order for discharge oxygen faxed to [facility].  Pt  notified this would be done.  Pt  has number for neurosurgery and will call for appointment.  Philomena LAUGHLINN, RN

## 2018-09-26 NOTE — TELEPHONE ENCOUNTER
Ok for testing supplies 8x/day and refill for fax tylenol#3 done. Patient also needs help with setting up neurosurgery from hospital if not already done in next couple weeks and home oxygen discharge from [facility]. David Finney MD thanks.

## 2018-09-26 NOTE — TELEPHONE ENCOUNTER
Pt  in clinic now, discussed message.  Dr. Finney had advised he would send pain med to pharmacy for pt back/hip pain.  He had advised he would send test strips and lancets with higher dispense amount to pharmacy.    1)  Pain med.    2) is it ok to send prescription for testing supply's to read test up to 8 times daily?  Philomena LAUGHLINN, RN

## 2018-10-22 NOTE — TELEPHONE ENCOUNTER
Controlled Substance Refill Request for tylenol #3  Problem List Complete:  No     PROVIDER TO CONSIDER COMPLETION OF PROBLEM LIST AND OVERVIEW/CONTROLLED SUBSTANCE AGREEMENT    Last Written Prescription Date:  9/26/18  Last Fill Quantity: 28,   # refills: 0    Last Office Visit with [facility] primary care provider: 9/25/18    Future Office visit:     Controlled substance agreement on file: No.     Processing:  Fax Rx to [facility] pharmacy   checked in past 3 months? No, not connected to you on .  Philomena Collins BSN, RN

## 2018-10-23 NOTE — TELEPHONE ENCOUNTER
Routing refill request to provider for review/approval because:  Labs out of range and not current, Creatinine.  Lisandra Alcaraz RN

## 2018-10-23 NOTE — TELEPHONE ENCOUNTER
She is due for diabetes appt again  Help her make an appt and then refill for 30 days    Shaneka Garza

## 2018-10-25 NOTE — TELEPHONE ENCOUNTER
I spoke to the patient and made an appointment to recheck her Diabetes on 12/10/18.    Sury Bruno,

## 2018-10-31 NOTE — TELEPHONE ENCOUNTER
30 day refill given  Has pending appointment with him on 12/10/18  Last OV: 9/25/18 Dr. David Finney   To follow up in 3 months for diabetes.   Hemoglobin A1C   Date Value Ref Range Status   04/26/2018 7.7 (H) 0 - 5.6 % Final     Comment:     Normal <5.7% Prediabetes 5.7-6.4%  Diabetes 6.5% or higher - adopted from ADA   consensus guidelines.         HumaLOG KwikPen Subcutaneous Solution Pen-injector 100 UNIT/ML  Will file in chart as: HUMALOG KWIKPEN 100 UNIT/ML soln  The source prescription has been discontinued.  INJECT 13 UNITS UNDER THE SKIN BEFORE BREAKFAST, LUNCH, AND DINNER        Disp: 33 mL (Pharmacy requested 33)   Refills: 0     Class: E-Prescribe Start: 10/31/2018   For: Type 1 diabetes mellitus with diabetic chronic kidney disease, unspecified CKD stage (H)  Originally ordered: 2 years ago by David Finney MD  Last refill:8/19/2018  Short Acting Insulin Protocol Vgryet29/31 7:12 AM   LDL on file in past 12 months    Microalbumin on file in past 12 months    Serum creatinine on file in past 12 months    HgbA1C in past 3 or 6 months

## 2018-11-08 NOTE — PATIENT INSTRUCTIONS
KELTON'harry from [facility] on 11/6/18 to home health   Primary Problem: Diabetic Ketoacidosis  LACE Score: 72    At your visit today, we discussed your risk for falls and preventive options.    Fall Prevention  Falls often occur due to slipping, tripping or losing your balance. Millions of people fall every year and injure themselves. Here are ways to reduce your risk of falling again.    Think about your fall, was there anything that caused your fall that can be fixed, removed, or replaced?    Make your home safe by keeping walkways clear of objects you may trip over, such as electric cords.    Use non-slip pads under rugs. Don't use area rugs or small throw rugs.    Use non-slip mats in bathtubs and showers.    Install handrails and lights on staircases. The handrails should be on both sides of the stairs.    Don't walk in poorly lit areas.    Don't stand on chairs or wobbly ladders.    Use caution when reaching overhead or looking upward. This position can cause a loss of balance.    Be sure your shoes fit properly, have non-slip bottoms and are in good condition.     Wear shoes both inside and out. Don't go barefoot or wear slippers.    Be cautious when going up and down stairs, curbs, and when walking on uneven sidewalks.    If your balance is poor, consider using a cane or walker.    If your fall was related to alcohol use, stop or limit alcohol intake.     If your fall was related to use of sleeping medicines, talk to your healthcare provider about this. You may need to reduce your dosage at bedtime if you awaken during the night to go to the bathroom.      To reduce the need for nighttime bathroom trips:  ? Don't drink fluids for several hours before going to bed  ? Empty your bladder before going to bed  ? Men can keep a urinal at the bedside    Stay as active as you can. Balance, flexibility, strength, and endurance all come from exercise. They all play a role in preventing falls. Ask your healthcare  provider which types of activity are right for you.    Get your vision checked on a regular basis.    If you have pets, know where they are before you stand up or walk so you don't trip over them.    Use night lights.    Go over all your medicines with a pharmacist or other healthcare provider to see if any of them could make you more likely to fall.  Date Last Reviewed: 4/1/2018 2000-2018 [employer]. [address] [place] [address], [place] [address]. All rights reserved. This information is not intended as a substitute for professional medical care. Always follow your healthcare professional's instructions.

## 2018-11-09 PROBLEM — I48.91 ATRIAL FIBRILLATION, UNSPECIFIED TYPE (H): Status: ACTIVE | Noted: 2018-01-01

## 2018-11-09 NOTE — PROGRESS NOTES
Six Item Cognitive Impairment Test   (6CIT):      What year is it?                               Incorrect - 4 points 2013    What month is it?                               Incorrect - 3 points      Give the patient an address to remember with five components:   Alexei Naidu ( first and last name - 2 components)   [address]  (number and name of street - 2 components)   [place] ( city - 1 component)      About what time is it (within the hour)? Correct - 0 points    Count backwards from 20 to 1:   Correct - 0 points    Say the months of the year in reverse: Correct - 0 points    Repeat the address phrase:   Correct - 0 points    Total 6CIT Score:      7/28    Interpretation: The 6CIT uses an inverse score and questions are weighted to produce a total out of 28. Scores of 0-7 are considered normal and 8 or more significant.    Advantages The test has high sensitivity without compromising specificity even in mild dementia. It is easy to translate linguistically and culturally.  Disadvantages The main disadvantage is in the scoring and weighting of the test, which is initially confusing, however computer models have simplified this greatly.    Probability Statistics: At the 7/8 cut off: Overall figures sensitivity 90% specificity 100%, in mild dementia sensitivity = 78% , specificity = 100%    Copyright 2000 The [facility], [facility], [place]. Courtesy of Dr. Shade Willis

## 2018-11-09 NOTE — NURSING NOTE
"Chief Complaint   Patient presents with     Hospital F/U       Initial /66 (Cuff Size: Adult Regular)  Pulse 91  Temp 96.4  F (35.8  C) (Oral)  Wt 125 lb (56.7 kg)  SpO2 (!) 89%  Breastfeeding? No  BMI 22.14 kg/m2 Estimated body mass index is 22.14 kg/(m^2) as calculated from the following:    Height as of 4/26/18: 5' 3\" (1.6 m).    Weight as of this encounter: 125 lb (56.7 kg).      Shanna Pacheco LPN    "

## 2018-11-09 NOTE — MR AVS SNAPSHOT
After Visit Summary   11/9/2018    Claudia Presley    MRN: [id number]           Patient Information     Date Of Birth          1935        Visit Information        Provider Department      11/9/2018 10:00 AM David Finney MD [facility]        Today's Diagnoses     Type 1 diabetes mellitus with diabetic chronic kidney disease, unspecified CKD stage (H)    -  1    Arthralgia of hip, unspecified laterality        Chronic bronchitis, unspecified chronic bronchitis type (H)        Dementia without behavioral disturbance, unspecified dementia type        Atrial fibrillation, unspecified type (H)           Follow-ups after your visit        Your next 10 appointments already scheduled     Dec 10, 2018 10:15 AM CST   Office Visit with David Finney MD   [facility]    [id number] [facility] [address] [phone]           Bring a current list of meds and any records pertaining to this visit. For Physicals, please bring immunization records and any forms needing to be filled out. Please arrive 10 minutes early to complete paperwork.              Who to contact     If you have questions or need follow up information about today's clinic visit or your schedule please contact [facility] directly at [phone].  Normal or non-critical lab and imaging results will be communicated to you by MyChart, letter or phone within 4 business days after the clinic has received the results. If you do not hear from us within 7 days, please contact the clinic through MyChart or phone. If you have a critical or abnormal lab result, we will notify you by phone as soon as possible.  Submit refill requests through [employer] or call your pharmacy and they will forward the refill request to us. Please allow 3 business days for your refill to be completed.          Additional Information About Your Visit        Care EveryWhere ID     This  is your Care EveryWhere ID. This could be used by other organizations to access your [facility] medical records  [id number]        Your Vitals Were     Pulse Temperature Pulse Oximetry Breastfeeding? BMI (Body Mass Index)       91 96.4  F (35.8  C) (Oral) 89% No 22.14 kg/m2        Blood Pressure from Last 3 Encounters:   11/09/18 112/66   09/25/18 105/77   04/26/18 130/80    Weight from Last 3 Encounters:   11/09/18 125 lb (56.7 kg)   09/25/18 126 lb (57.2 kg)   04/26/18 125 lb (56.7 kg)              Today, you had the following     No orders found for display         Where to get your medicines      Some of these will need a paper prescription and others can be bought over the counter.  Ask your nurse if you have questions.     Bring a paper prescription for each of these medications     acetaminophen-codeine 300-30 MG per tablet         Information about OPIOIDS     PRESCRIPTION OPIOIDS: WHAT YOU NEED TO KNOW   We gave you an opioid (narcotic) pain medicine. It is important to manage your pain, but opioids are not always the best choice. You should first try all the other options your care team gave you. Take this medicine for as short a time (and as few doses) as possible.    Some activities can increase your pain, such as bandage changes or therapy sessions. It may help to take your pain medicine 30 to 60 minutes before these activities. Reduce your stress by getting enough sleep, working on hobbies you enjoy and practicing relaxation or meditation. Talk to your care team about ways to manage your pain beyond prescription opioids.    These medicines have risks:    DO NOT drive when on new or higher doses of pain medicine. These medicines can affect your alertness and reaction times, and you could be arrested for driving under the influence (DUI). If you need to use opioids long-term, talk to your care team about driving.    DO NOT operate heavy machinery    DO NOT do any other dangerous activities while taking  these medicines.    DO NOT drink any alcohol while taking these medicines.     If the opioid prescribed includes acetaminophen, DO NOT take with any other medicines that contain acetaminophen. Read all labels carefully. Look for the word  acetaminophen  or  Tylenol.  Ask your pharmacist if you have questions or are unsure.    You can get addicted to pain medicines, especially if you have a history of addiction (chemical, alcohol or substance dependence). Talk to your care team about ways to reduce this risk.    All opioids tend to cause constipation. Drink plenty of water and eat foods that have a lot of fiber, such as fruits, vegetables, prune juice, apple juice and high-fiber cereal. Take a laxative (Miralax, milk of magnesia, Colace, Senna) if you don t move your bowels at least every other day. Other side effects include upset stomach, sleepiness, dizziness, throwing up, tolerance (needing more of the medicine to have the same effect), physical dependence and slowed breathing.    Store your pills in a secure place, locked if possible. We will not replace any lost or stolen medicine. If you don t finish your medicine, please throw away (dispose) as directed by your pharmacist. The [place] Pollution Control Agency has more information about safe disposal: [email or web address]         Primary Care Provider Office Phone # Fax #    David Finney [phone] [address] [facility] [address]        Equal Access to Services     KIRAN SHERIFF : Hadii aad ku hadasho Socasaali, waaxda luqadaha, qaybta kaalmada adelavellyada, micky barrera. So [facility] [phone].    ATENCIÓN: Si habla español, tiene a herndon disposición servicios gratuitos de asistencia lingüística. Llame al [phone].    We comply with applicable federal civil rights laws and [place] laws. We do not discriminate on the basis of race, color, national  origin, age, disability, sex, sexual orientation, or gender identity.            Thank you!     Thank you for [facility]  [facility]. Our goal is always to provide you with excellent care. Hearing back from our patients is one way we can continue to improve our services. Please take a few minutes to complete the written survey that you may receive in the mail after your visit with us. Thank you!             Your Updated Medication List - Protect others around you: Learn how to safely use, store and throw away your medicines at www.disposemymeds.org.          This list is accurate as of 11/9/18 10:48 AM.  Always use your most recent med list.                   Brand Name Dispense Instructions for use Diagnosis    acetaminophen-codeine 300-30 MG per tablet    TYLENOL #3    28 tablet    TAKE 1/2 TO 1 TABLET BY MOUTH EVERY 8 HOURS AS NEEDED FOR PAIN    Arthralgia of hip, unspecified laterality       albuterol 108 (90 Base) MCG/ACT inhaler    PROAIR HFA/PROVENTIL HFA/VENTOLIN HFA     Inhale 2 puffs into the lungs every 6 hours        amLODIPine 2.5 MG tablet    NORVASC    180 tablet    Take 2 tablets (5 mg) by mouth daily    Essential hypertension with goal blood pressure less than 140/90       aspirin 81 MG tablet      1 TABLET DAILY        * BD RACHEL U/F 32G X 4 MM   Generic drug:  insulin pen needle     200 each    use 4 times daily    Type 1 diabetes, HbA1c goal < 8% (H)       * insulin pen needle 32G X 4 MM    BD RACHEL U/F    900 each    Use ten times daily.    Type 1 diabetes mellitus with diabetic chronic kidney disease, unspecified CKD stage (H), Labile blood glucose       blood glucose lancets standard    no brand specified    900 each    Use to test blood sugar 10 times daily or as directed.  per patient's insurance    Type 1 diabetes mellitus with diabetic chronic kidney disease, unspecified CKD stage (H), Labile blood glucose       blood glucose lancing device    no brand specified     1 each    Use to test blood sugars 10 times daily or as directed. Brand per pt insurance    Type 1 diabetes mellitus with diabetic chronic kidney disease, unspecified CKD stage (H)       blood glucose monitoring meter device kit    no brand specified    1 kit    Use to test blood sugar 4 times daily or as directed. Dispense One Touch Ultra or per patient's insurance    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)       blood glucose monitoring test strip    no brand specified    900 strip    Pt tests up to10 times daily. Dispense One Touch Ultra test strips or per patient/insurance preference    Type 1 diabetes mellitus with diabetic chronic kidney disease, unspecified CKD stage (H)       budesonide-formoterol 160-4.5 MCG/ACT Inhaler    SYMBICORT    3 Inhaler    Inhale 1 puff into the lungs 2 times daily Daily inhaler for copd Pharmacy ok to hold prescription until due    Chronic bronchitis, unspecified chronic bronchitis type (H)       calcium-vitamin D 500-125 MG-UNIT Tabs      Take 1 tablet by mouth daily        cloNIDine 0.1 MG tablet    CATAPRES    180 tablet    TAKE one TABLET BY MOUTH 2 TIMES DAILY FOR BLOOD PRESSURE.    Essential hypertension with goal blood pressure less than 140/90       cyanocobalamin 1000 MCG tablet    vitamin  B-12     Take 1,000 mcg by mouth daily        * FUROSEMIDE PO      Take 20 mg by mouth daily        * furosemide 20 MG tablet    LASIX    90 tablet    take 1 tablet by mouth every day as needed for blood pressure or leg swelling    Peripheral edema       glucagon 1 MG kit    GLUCAGON EMERGENCY    1 mg    Inject 1 mg into the muscle once for 1 dose    Type 1 diabetes mellitus with diabetic chronic kidney disease, unspecified CKD stage (H)       * HumaLOG KWIKpen 100 UNIT/ML injection   Generic drug:  insulin lispro     1 mL    INJECT 11 UNITS UNDER THE SKIN BEFORE BREAKFAST, LUNCH, AND DINNER    Type 1 diabetes mellitus with diabetic chronic kidney disease,  unspecified CKD stage (H)       * insulin lispro 100 UNIT/ML injection    HumaLOG KWIKpen    33 mL    Inject 13 Units Subcutaneous 3 times daily (before meals)    Type 1 diabetes mellitus with diabetic chronic kidney disease, unspecified CKD stage (H)       insulin glargine U-300 300 UNIT/ML injection    TOUJEO    1 mL    Inject 10 Units Subcutaneous every morning    Type 1 diabetes mellitus with diabetic chronic kidney disease, unspecified CKD stage (H)       levothyroxine 137 MCG tablet    SYNTHROID/LEVOTHROID    90 tablet    Take 1 tablet (137 mcg) by mouth daily This is high dosage for thyroid    Hypothyroidism, unspecified type       losartan 25 MG tablet    COZAAR    45 tablet    Take 0.5 tablets (12.5 mg) by mouth daily For blood pressure with breakfast Pharmacy ok to hold prescription until due    CKD (chronic kidney disease) stage 3, GFR 30-59 ml/min (H)       metoprolol succinate 50 MG 24 hr tablet    TOPROL-XL    90 tablet    Take 1 tablet (50 mg) by mouth daily For blood pressure and lowers pulse. Pharmacy ok to hold prescription until due    Essential hypertension with goal blood pressure less than 140/90       order for DME     3 Units    Equipment being ordered: Freestyle Cesar CGM (continuous glucose monitoring system) Will need 1 reader device and 3 sensor discs.  This is for one month supply. When refills she will need 3 sensor discs each month, or 9 sensors for 3 months supply    Diabetes mellitus, type 2 (H)       sertraline 50 MG tablet    ZOLOFT    90 tablet    Take 1 tablet (50 mg) by mouth daily For depression/anxiety (this is double dosage from before).    Major depressive disorder, recurrent episode, moderate (H)       simvastatin 20 MG tablet    ZOCOR    90 tablet    TAKE ONE TABLET BY MOUTH ONCE DAILY. AT BEDTIME FOR CHOLESTEROL    Hyperlipidemia LDL goal <100       * Notice:  This list has 6 medication(s) that are the same as other medications prescribed for you. Read the directions  carefully, and ask your doctor or other care provider to review them with you.

## 2018-11-09 NOTE — PROGRESS NOTES
Clinic Care Coordination Contact 11/9/18  Care Team Conversations-KEILY    Conversation with RNCC about this pt as she was coming to the [place] clinic today to meet with her PCP. KEILY updated PCP with the most recent concerns surrounding pt. KEILY also connected with [facility], Renata Peters ([phone] fax [phone]) to notify her that the pt and her  were in clinic today and that our provider had a conversation about the pt needing supervision, etc.     KEILY will remain available to assist as needed.    Anisa Ni, [facility]  Care Coordinator Social Work    [facility]  [phone]  11/9/2018 11:54 AM

## 2018-11-09 NOTE — PROGRESS NOTES
SUBJECTIVE:  Claudia Presley, a 83 year old female scheduled an appointment to discuss the following issues:  Follow-up hospitalization for mild DKA/new onset a.fib with RVR and confusion. History dementia, fragile type 1 diabetic and copd.   Patient started on diltizem. Echo done and ef ok. Patient had blood sugars >700. No more palpitations. No chest pain. A little SHORTNESS OF BREATH. No more falls. Sleep ok. Pain still an issue.   Sugars improving overall now. Blood sugars this . Fluids ok. Patient would like some more tylenol#3 for pain - especially for sleeping.     Per discharge summary:    1. Uncontrolled diabetes mellitus with mild ketoacidosis and lactic acidosis. Ms. Presley is an 83-year-old female with dementia who apparently called EMS because she was not feeling well. Due to her significant memory deficit, she does not have recollection of what exactly happened. But basically, when EMT arrived, she was found to be in AFib with RVR and further evaluation here showed blood sugars in the 700 range with mild DKA and lactic acidosis. She was admitted and started on insulin drip per the DKA protocol and IV fluids. She has recovered very well from this. Because of the dementia and being alone at home, it is likely she missed her insulin dosage and she may not have been watching her diet. Even though after admission it appears she has very poor oral intake, which is also the same way at home according to her , she is requiring actually much less insulin. She was seen by a diabetic educator and we talked with the family and it has been strongly advised that she should not be managing her diabetes and her  will try to do so. We are sending her home also with home care. We will tolerate a higher blood sugar in this patient because she is at high risk of hypoglycemia.  2. Atrial fibrillation with RVR. This is a new issue and rate is not well controlled. Unfortunately, she is not a good  candidate for anticoagulation because of dementia and a fall risk.  3. Dementia. This is significant issue. It is unclear why the family left her alone while the  [place]. Family now understands that she needs to be supervised all the time. So she is going home with home supervision as well as, hopefully, home care.  4. COPD. She is supposed to be on home oxygen, but she does not take it all the time. She was requiring oxygen while she was here.  5. She has also had 1 compression fracture and because she was complaining of pain at the time of presentation, she was found to have more retropulsion into the spinal canal. Apparently, she was not wearing her lumbar brace and that strongly advised her to do so.        Past Medical History:   Diagnosis Date     COPD (chronic obstructive pulmonary disease) (H)      Depression      Diverticulosis of colon 2010    seen on colonoscopy     Hypertension      Hypothyroidism      Osteopenia      Osteoporosis      Peripheral edema      Spinal stenosis      Type I (juvenile type) diabetes mellitus without mention of complication, not stated as uncontrolled        Past Surgical History:   Procedure Laterality Date     BREAST LUMPECTOMY, RT/LT      Breast Lumpectomy RT/LT     CATARACT IOL, RT/LT       PHACOEMULSIFICATION WITH STANDARD INTRAOCULAR LENS IMPLANT  3/2007; 3/2007    left eye; right eye (STS)     TONSILLECTOMY         Family History   Problem Relation Age of Onset     Cancer - colorectal Father      PASSED AWAY FROM COLON CA     Alzheimer Disease Brother      PICKS DISEASE     Alzheimer Disease Sister      ON ARICEPT     Depression Sister      POST PARTUM     Respiratory Brother      COPD     Arthritis Sister      Osteoporosis Sister      Thyroid Disease Sister      Alzheimer Disease Mother      Breast Cancer Maternal Aunt        Social History   Substance Use Topics     Smoking status: Former Smoker     Types: Cigarettes     Quit date: 1/1/1998      Smokeless tobacco: Never Used      Comment: 1998     Alcohol use No       ROS:  All other ROS negative.     OBJECTIVE:  /66 (Cuff Size: Adult Regular)  Pulse 91  Temp 96.4  F (35.8  C) (Oral)  Wt 125 lb (56.7 kg)  SpO2 (!) 89%  Breastfeeding? No  BMI 22.14 kg/m2  EXAM:  GENERAL APPEARANCE: healthy, alert and no distress  EYES: EOMI,  PERRL  HENT: ear canals and TM's normal and nose and mouth without ulcers or lesions  NECK: no adenopathy, no asymmetry, masses, or scars and thyroid normal to palpation  RESP: lungs clear to auscultation - no rales, rhonchi or wheezes  CV: {IRIR  ABDOMEN:  soft, nontender, no HSM or masses and bowel sounds normal  MS: pain with RANGE OF MOTION hip and in back brace  PSYCH: affect normal/bright  PSYCH: poor historian.     ASSESSMENT / PLAN:  (E10.22) Type 1 diabetes mellitus with diabetic chronic kidney disease, unspecified CKD stage (H)  (primary encounter diagnosis)  Comment: fragile and episodes of hypoglycemia and hyperglycemia  Plan: continue current insulin dosage. Patient needs help with home health with meds. Seen dm ed a few times in past and in hospital and will memory issues our best bet has been to continue old regiment because more familiar. Recheck in 3 months  Closely monitor.      (M25.258) Arthralgia of hip, unspecified laterality  Comment: and back fracture too  Plan: acetaminophen-codeine (TYLENOL #3) 300-30 MG         per tablet        Patient is having a lot of pain. Continue prn tylenol#3 for mainly sleep.     (J42) Chronic bronchitis, unspecified chronic bronchitis type (H)  Comment: currently stable  Plan: continue mdi's.     (F03.90) Dementia without behavioral disturbance, unspecified dementia type  Comment: progressive  Plan: patient should NOT be left alone. I advised patient/ that at a bear minimum needs more home health help - home safety eval/nursing to help with meds/etc. If not might need to be in group home. Fragile elderly with  labile blood sugars/high fall risk and gets confused easily. I discussed with our care coordination and adult services involved.     (I48.91) Atrial fibrillation, unspecified type (H)  Comment: rate ok.  Plan: continue diltiziem and metoprolol. Poor warfarin candidate with memory issues/high fall risk for bleeding. Continue asa. Recheck in 3 months      David Finney

## 2018-11-27 NOTE — LETTER
[facility]  Complex Care Plan  About Me  Patient Name:  Claudia Nguyen    YOB: 1935  Age:     83 year old   Dave MRN:   [id number] Telephone Information:    Home Phone [phone]   Mobile [phone]       Address:    [address] [place]  [place] [address] Email address:  No e-mail address on record      Emergency Contact(s)  Name Relationship Lgl Grd Work Phone Home Phone Mobile Phone   1. KATIE NGUYEN Spouse   [phone]    2. LISA ABBOTT Friend   [phone]            Primary language:  English     needed? No   [place] [facility]:  [phone] op. 1  Other communication barriers: Cognitive impairment, Caregiver  Preferred Method of Communication:  Mail  Current living arrangement: I live in a private home with spouse  Mobility Status/ Medical Equipment: Independent    Health Maintenance  Health Maintenance Reviewed:   Health Maintenance Due   Topic Date Due     COLONOSCOPY Q5 YR  08/19/2015     MICROALBUMIN Q1 YEAR  04/08/2017     LIPID MONITORING Q1 YEAR  07/15/2017     EYE EXAM Q1 YEAR  12/29/2017     FOOT EXAM Q1 YEAR  07/10/2018     INFLUENZA VACCINE (1) 09/01/2018       My Access Plan  Medical Emergency 911   Primary Clinic Line [facility] - [phone]   24 Hour Appointment Line [phone] or  [phone]) (toll-free)   24 Hour Nurse Line [phone] (toll-free)   Preferred Urgent Care [facility], [phone]   [facility]  [phone]   Preferred Pharmacy [facility] [address] [place] [address] [facility]     Behavioral Health Crisis Line The National Suicide Prevention Lifeline at 1-974.812.6109 or 911     My Care Team Members    Patient Care Team       Relationship Specialty Notifications Start End    David Finney MD PCP - General Family Practice  1/14/14     Phone: [phone] Fax: [phone] [address] [facility] [address]    Hilton Gibson MD MD Neurology  4/6/16     Comment:  [facility] [place]    Phone: [phone] Fax: [phone]         [facility] NEUROLOGY [address] [facility] [address]    Precious Clarke, RN Clinic Care Coordinator Primary Care - CC Admissions 11/12/18     Phone: [phone] Fax: [phone]                My Care Plans  Self Management and Treatment Plan  Goals and (Comments)  Goals        General    Monitoring (pt-stated)     Notes - Note created  11/29/2018 11:20 AM by Precious Clarke, RN    Goal Statement: I will continue to have help setting up my medications, checking my blood sugars, taking my insulin, and eating 3 meals per day.   Measure of Success:  to assist with daily cares and as above.   Supportive Steps to Achieve: patient and  will attend appointments with my doctors and ask questions as needed.   Barriers: patients complex medical needs and diagnosis  Strengths: active with care coordination   Date to Achieve By: ongoing  Patient expressed understanding of goal: yes                 Advance Care Plans/Directives Type:     My Medical and Care Information  Problem List   Patient Active Problem List   Diagnosis     Hypothyroidism     HYPERLIPIDEMIA LDL GOAL <100     Encounter for counseling     Hypertension goal BP (blood pressure) < 140/90     Moderate major depression (H)     Pseudophakia, ou     CAD (coronary artery disease)     Type 1 diabetes, HbA1c goal < 8% (H)     COPD (chronic obstructive pulmonary disease) (H)     Spinal stenosis     Diverticulosis of colon     Advanced directives, counseling/discussion     CKD (chronic kidney disease) stage 3, GFR 30-59 ml/min (H)     Peripheral edema     Osteoporosis     Personal history of smoking     BPPV (benign paroxysmal positional vertigo)     ABMD (anterior basement membrane dystrophy), ou     Hypothyroidism, unspecified hypothyroidism  type     Memory loss     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)     Major depressive disorder, recurrent episode, mild (H)     Chronic bronchitis, unspecified chronic bronchitis type (H)     Essential hypertension with goal blood pressure less than 140/90     Type 1 diabetes mellitus with diabetic chronic kidney disease, unspecified CKD stage (H)     Hypothyroidism, unspecified type     Macular drusen, bilateral     Posterior vitreous detachment, bilateral     Major depressive disorder, recurrent episode, moderate (H)     Dermatitis     Dementia without behavioral disturbance, unspecified dementia type     Atrial fibrillation, unspecified type (H)          Care Coordination Start Date: No linked episodes   Frequency of Care Coordination: Bi-weekly   Form Last Updated: 11/29/2018

## 2018-11-27 NOTE — PROGRESS NOTES
Clinic Care Coordination Contact    Patients case was opened for investigation.     Katherine Peña is the  assigned to patient from the [facility]. Her direct line, [phone].    CC RN called and left a detailed message on her VM asking for a return call.     Plan:  1. Await return call from   2. Follow up with patient.    CLARK Pérez RN Clinic Care Coordinator   [place], [place], [place]  Phone: [phone]

## 2018-11-27 NOTE — PROGRESS NOTES
Clinic Care Coordination Contact    Situation: Patient chart reviewed by care coordinator.    Background: 11/8/18 adult protection vulnerable adult report was filed with [facility]. 11/9/18, patient was seen here in clinic. Dr. David Finney notes:   (F03.90) Dementia without behavioral disturbance, unspecified dementia type  Comment: progressive  Plan: patient should NOT be left alone. I advised patient/ that at a bear minimum needs more home health help - home safety eval/nursing to help with meds/etc. If not might need to be in group home. Fragile elderly with labile blood sugars/high fall risk and gets confused easily. I discussed with our care coordination and adult services involved.     Assessment: CC RN wants to ensure that the [facility] is involved in this case as the result of 11/8/18 and 11/9/18 documentation. CC RN called [facility] and requested a return call, CC RNs direct line was provided to Yue at [facility]. Either Maggy with intake or Katherine Peters an adult protection investigator will call CC RN back with update on [place] involvement.     Plan/Recommendations: CC RN plans to be available for call back, and will outreach to [facility] in 1-2 business days if no return call.    CLARK Pérez RN Clinic Care Coordinator   [place], [place], [place]  Phone: [phone]

## 2018-11-28 NOTE — PROGRESS NOTES
"Clinic Care Coordination Contact  Clinic Care Coordination Contact  OUTREACH  Referral Information:  Referral Source: IP Report  Primary Diagnosis: Psychosocial  Difficulty keeping appointments:: No  Compliance Concerns: Yes     Clinical Concerns:  Current Medical Concerns:    Called patient, she stated she is doing \"good\" and is home alone. She had a waffle for breakfast, checked her blood sugar and took her morning medications.   Her  is out at th [employer] running the dog.   She stated \"I like being home alone\"     CC RN asked patient if any one has been out to the house, or received any calls from the [facility]. Patient denies both of those things happening. However, patient also couldn't recall being in the clinic 11/9/18 to see Dr. David Finney.   CC RN is unsure of what the [facility] has or has not done at this time.   Current Behavioral Concerns: reviewed Dr. David Bennett notes on her being left along. Patient denies a concern for being left alone she feels she manages just fine.     Education Provided to patient: medications,  eating 3 meals per day, letting clinic know of any worrisome symptoms, follow up in 3 months with PCP.   Pain  Pain (GOAL):: Yes (\"Radha had this for years\")  Type: Chronic (>3mo)  Location of chronic pain:: back pain  Raditing: No  Progression: Unchanged  Chronic pain severity:: 1  Limitation of routine activities due to chronic pain:: No  Health Maintenance Reviewed: Not assessed  Clinical Pathway: None    Functional Status:  Dependent ADLs:: Independent, Bathing, Eating, Grooming  Dependent IADLs:: Cleaning, Cooking, Laundry, Shopping, Meal Preparation, Medication Management, Money Management, Transportation  Bed or wheelchair confined:: No  Mobility Status: Independent    Living Situation:  Current living arrangement:: I live in a private home with spouse    Diet/Exercise/Sleep:  Diet:: Regular    Transportation:  Transportation concerns (GOAL):: No  Transportation means:: Regular " car, Family     Psychosocial:  Mental health DX:: Yes  Mental health DX how managed:: Medication  Mental health management concern (GOAL):: Yes  Informal Support system:: Spouse     Financial/Insurance:   Financial/Insurance concerns (GOAL):: Yes     Resources and Interventions:  Community Resources: Protective Services  Supplies used at home:: Diabetic Supplies  Equipment Currently Used at Home: glucometer    Advance Care Plan/Directive  Advanced Care Plans/Directives on file:: No   Goals: ensure that adult protection services are involved in care.    Patient/Caregiver understanding: patient denies a need for help or care at home.   Outreach Frequency: weekly  Future Appointments              In 1 week David Finney MD [facility]      Plan:   1. CC RN called [facility] with [facility] again.  CLARK Pérez RN Clinic Care Coordinator   [place], [place], [place]  Phone: [phone]

## 2018-11-29 NOTE — PROGRESS NOTES
Clinic Care Coordination Contact    Juan Pablo Peña adult protection  did return call at 3:55 yesterday.    Called juan pablo, she stated that yes, they have been out to see patient and . They are currently investigating patients status.     Juan Pablo urged clinic or CC RN to reach out to her with any questions or concerns.    Her direct line: [phone].    Plan:  1. CC RN will check in with patient after her 12/10 DM follow up appointment with Dr. David Finney.  2. Enrolled patient in care coordination as her medical needs are complex.     Precious Clarke, QIANN RN Clinic Care Coordinator   [place], [place], [place]  Phone: [phone]

## 2018-12-04 NOTE — PROGRESS NOTES
Clinic Care Coordination Contact    venus Collier wife calling based on receiving the 11/29/18 care coordination letter.   He stated he received a letter in the mail from [facility] with information on medical assistance, assess and services.   He has specific questions around assets. He is fearing that if he or his wife goes to a nursing home, the spouse remaining in the home will not be able to afford the house and and what is needed to maintain a home.     He stated he has the contact info from the  who was out to see him and the patient from the [facility]. He called her, who advised him to call [facility] financial dept. He did this at 8 am and has not received a call back. He is anxious to get a call back to clarify his financial/asset questions. CC RN advised patient he did the right thing. He utilized the resources he was given. CC RN advised that she works for [employer] and is not connected to the [facility]. He understood this. He will await a return call from [facility] today, and call again tomorrow if there is no return call. He also has the 's contact info for additional support.     Plan:  1. CC RN is scheduled to follow up with patient via phone in a week in a half.     CLARK Pérez RN Clinic Care Coordinator   [place], [place], [place]  Phone: [phone]

## 2018-12-13 NOTE — PROGRESS NOTES
"Clinic Care Coordination Contact  Calling patient for routine follow up.     No sob or difficulty breathing. Taking all meds as prescribed.   Patient is checking bs 4x per day. She keeps a diary in her bedroom.   147 before breakfast. 266 before lunch and she gave herself 10 units with lunch.  At bed time last night bs was 88. She drank orange juice, and was up to 94. She was asymptomatic.     She is getting 3 meals per day, she prepares most meals. Milk with each meal and OJ with breakfast, and she takes pills with water.   She denies any falls over last 2 weeks  She is sleeping \"very good\"   Patient is having pain today in her right leg that she attributes to \"old age\" Stared early today, \"I have always had achy legs\" no injury. She does not take anything over the counter for this pain, she stated its mild irritation.     Patient gave the phone to Nando her , he wanted to know who was asking all these questions, CC RN introduced self and role and reminded him that we spoke on 11/28/18 as well. He went to the [facility] and \"nothing has been settled.\" he stated the [facility] has nothing they can offer.   Nando stated he understood that dementia is progressive. DANTE RN offered to send alzheimer's resources. He declined. DANTE RN stated that she was here to provide an extra layer of support.   At this time, Nando became frim over situation. He stated \"I don't think its necessary, any decisions that are made will be our decisions, no one else's. At the present time, everything is fine\"     Nando stated he wishes for no further calls to be made to the house from DANTE ARGUELLES.     CC RN has un enrolled patient from care coordination.     FYI to PCP.  QIAN PérezN RN Clinic Care Coordinator   [place]  Phone: [phone]     "

## 2018-12-16 NOTE — PROGRESS NOTES
Clinic Care Coordination Contact  OUTREACH    Referral Information:  Referral Source: MTM  Reason for Contact: Initial: Financial Concerns primarily related to drug coverage  Care Conference: No     Universal Utilization:   ED Visits in last year: 0  Hospital visits in last year: 1  Last PCP appointment: 01/04/18  Missed Appointments: 0  Concerns: Proper Utilization  Multiple Providers or Specialists: Yes (Diabetic Educator)    Clinical Concerns:  Current Medical Concerns: Pt has Type 1 diabetes mellitus with diabetic chronic kidney disease. Pt also has hypothyroidism and major depressive disorder- pt was hospitalized on 12/21 following an overdose of insulin medication in an attempt to commit suicide. Pt is taking zoloft 50 MG tablet and has declined therapeutic interventions due to noted memory loss. Pt's memory loss may need to be managed in a supportive living environment at some point, however pt's spouse is supportive at this time and able to help pt meet all of her needs. Pt has been working with aa diabetic educator to better understand diabetic management. Diabetic Educator placed a care coordination referral to assess if there was a better insurance plan that would help with drug costs.   Current Behavioral Concerns: History of suicide attempt/ideation, Major Depression being managed with zoloft    Education Provided to patient: Care Coordination, Senior Linkage Line, MTM, Prescription Assistance   Clinical Pathway Name: Depression    Medication Management:  Pt reports adherence, Pt is working with diabetic educator, May be beneficial to work with MTM     Functional Status:  Mobility Status: Independent  Equipment Currently Used at Home: none  Transportation: Pt's spouse provides     Psychosocial:  Current living arrangement:: I live in a private home with spouse  Financial/Insurance: [facility], household income is $1317 (spouse income) +$644 (pt income) + $650 old investments, based on this income pt will not  qualify for any [facility]/state financial programs including Medicare Extra Savings or Medical Assistance plans. Pt is unable to change insurance plans until open enrollment October 15th-Decmeber 7th. Made a referral to the [place] Financial Assistance program to see if there is any assistance through the drug [employer]. Will await return call prior to making a referral to [facility].     Resources and Interventions:  Current Resources: No formal supports in place  Advanced Care Plans/Directives on file:: No  Referrals Placed: Senior Linkage Line, Prescription Assistance Programs, Community Resources  Patient/Caregiver understanding: Pt reports understanding and denies any additional questions or concerns at this times. KEILY CC engaged in AIDET communication  during encounter.  Frequency of Care Coordination: 4 weeks     Plan: KEILY HOWELL to await return call and follow-up with pt.    EVAN Eller  [facility]   [phone]  [email or web address]         detailed exam

## 2018-12-19 NOTE — TELEPHONE ENCOUNTER
Routing refill request to provider for review/approval because:  Labs not current:  Last lipid panel 7/15/16. Ginger Murguia RN

## 2018-12-31 NOTE — TELEPHONE ENCOUNTER
Controlled Substance Refill Request for Tylenol #3  Problem List Complete:  No     PROVIDER TO CONSIDER COMPLETION OF PROBLEM LIST AND OVERVIEW/CONTROLLED SUBSTANCE AGREEMENT    Last Written Prescription Date:  11/9/18  Last Fill Quantity: 28,   # refills: 0    Last Office Visit with G primary care provider: 11/9/18    Future Office visit:     Controlled substance agreement on file: No.     Processing:  [facility]   checked in past 3 months?  No, route to RN  site temporarily unavailable at this time.  Ginger Murguia RN

## 2019-01-01 ENCOUNTER — NURSE TRIAGE (OUTPATIENT)
Dept: NURSING | Facility: CLINIC | Age: 84
End: 2019-01-01

## 2019-01-01 ENCOUNTER — OFFICE VISIT (OUTPATIENT)
Dept: FAMILY MEDICINE | Facility: CLINIC | Age: 84
End: 2019-01-01
Payer: COMMERCIAL

## 2019-01-01 ENCOUNTER — DOCUMENTATION ONLY (OUTPATIENT)
Dept: LAB | Facility: CLINIC | Age: 84
End: 2019-01-01

## 2019-01-01 ENCOUNTER — TRANSFERRED RECORDS (OUTPATIENT)
Dept: HEALTH INFORMATION MANAGEMENT | Facility: CLINIC | Age: 84
End: 2019-01-01

## 2019-01-01 VITALS
BODY MASS INDEX: 19.31 KG/M2 | OXYGEN SATURATION: 92 % | SYSTOLIC BLOOD PRESSURE: 112 MMHG | WEIGHT: 109 LBS | RESPIRATION RATE: 20 BRPM | TEMPERATURE: 97.5 F | DIASTOLIC BLOOD PRESSURE: 69 MMHG | HEART RATE: 110 BPM | HEIGHT: 63 IN

## 2019-01-01 DIAGNOSIS — E10.22 TYPE 1 DIABETES MELLITUS WITH DIABETIC CHRONIC KIDNEY DISEASE, UNSPECIFIED CKD STAGE (H): ICD-10-CM

## 2019-01-01 DIAGNOSIS — I48.91 ATRIAL FIBRILLATION, UNSPECIFIED TYPE (H): ICD-10-CM

## 2019-01-01 DIAGNOSIS — E10.9 TYPE 1 DIABETES, HBA1C GOAL < 8% (H): ICD-10-CM

## 2019-01-01 DIAGNOSIS — F33.1 MAJOR DEPRESSIVE DISORDER, RECURRENT EPISODE, MODERATE (H): ICD-10-CM

## 2019-01-01 DIAGNOSIS — E78.5 HYPERLIPIDEMIA LDL GOAL <100: Primary | ICD-10-CM

## 2019-01-01 DIAGNOSIS — J42 CHRONIC BRONCHITIS, UNSPECIFIED CHRONIC BRONCHITIS TYPE (H): ICD-10-CM

## 2019-01-01 DIAGNOSIS — F03.90 DEMENTIA WITHOUT BEHAVIORAL DISTURBANCE, UNSPECIFIED DEMENTIA TYPE: ICD-10-CM

## 2019-01-01 DIAGNOSIS — I10 ESSENTIAL HYPERTENSION WITH GOAL BLOOD PRESSURE LESS THAN 140/90: ICD-10-CM

## 2019-01-01 DIAGNOSIS — N18.30 CKD (CHRONIC KIDNEY DISEASE) STAGE 3, GFR 30-59 ML/MIN (H): ICD-10-CM

## 2019-01-01 DIAGNOSIS — E03.9 HYPOTHYROIDISM, UNSPECIFIED TYPE: ICD-10-CM

## 2019-01-01 DIAGNOSIS — I25.10 CORONARY ARTERY DISEASE WITHOUT ANGINA PECTORIS, UNSPECIFIED VESSEL OR LESION TYPE, UNSPECIFIED WHETHER NATIVE OR TRANSPLANTED HEART: Primary | ICD-10-CM

## 2019-01-01 DIAGNOSIS — E78.5 HYPERLIPIDEMIA LDL GOAL <100: ICD-10-CM

## 2019-01-01 DIAGNOSIS — M25.559 ARTHRALGIA OF HIP, UNSPECIFIED LATERALITY: ICD-10-CM

## 2019-01-01 LAB
ANION GAP SERPL CALCULATED.3IONS-SCNC: 6 MMOL/L (ref 3–14)
BUN SERPL-MCNC: 31 MG/DL (ref 7–30)
CALCIUM SERPL-MCNC: 9.6 MG/DL (ref 8.5–10.1)
CHLORIDE SERPL-SCNC: 98 MMOL/L (ref 94–109)
CHOLEST SERPL-MCNC: 203 MG/DL
CO2 SERPL-SCNC: 32 MMOL/L (ref 20–32)
CREAT SERPL-MCNC: 1.35 MG/DL (ref 0.52–1.04)
CREAT UR-MCNC: 276 MG/DL
GFR SERPL CREATININE-BSD FRML MDRD: 36 ML/MIN/{1.73_M2}
GLUCOSE SERPL-MCNC: 250 MG/DL (ref 70–99)
HBA1C MFR BLD: 10.5 % (ref 0–5.6)
HDLC SERPL-MCNC: 74 MG/DL
LDLC SERPL CALC-MCNC: 109 MG/DL
MICROALBUMIN UR-MCNC: 208 MG/L
MICROALBUMIN/CREAT UR: 75.36 MG/G CR (ref 0–25)
NONHDLC SERPL-MCNC: 129 MG/DL
POTASSIUM SERPL-SCNC: 3.8 MMOL/L (ref 3.4–5.3)
SODIUM SERPL-SCNC: 136 MMOL/L (ref 133–144)
T4 FREE SERPL-MCNC: 0.91 NG/DL (ref 0.76–1.46)
TRIGL SERPL-MCNC: 101 MG/DL
TSH SERPL DL<=0.005 MIU/L-ACNC: 62.96 MU/L (ref 0.4–4)

## 2019-01-01 PROCEDURE — 83036 HEMOGLOBIN GLYCOSYLATED A1C: CPT | Performed by: FAMILY MEDICINE

## 2019-01-01 PROCEDURE — 99214 OFFICE O/P EST MOD 30 MIN: CPT | Performed by: FAMILY MEDICINE

## 2019-01-01 PROCEDURE — 80061 LIPID PANEL: CPT | Performed by: FAMILY MEDICINE

## 2019-01-01 PROCEDURE — 99207 C PAF COMPLETED  NO CHARGE: CPT | Performed by: FAMILY MEDICINE

## 2019-01-01 PROCEDURE — 84439 ASSAY OF FREE THYROXINE: CPT | Performed by: FAMILY MEDICINE

## 2019-01-01 PROCEDURE — 80048 BASIC METABOLIC PNL TOTAL CA: CPT | Performed by: FAMILY MEDICINE

## 2019-01-01 PROCEDURE — 82043 UR ALBUMIN QUANTITATIVE: CPT | Performed by: FAMILY MEDICINE

## 2019-01-01 PROCEDURE — 36415 COLL VENOUS BLD VENIPUNCTURE: CPT | Performed by: FAMILY MEDICINE

## 2019-01-01 PROCEDURE — 84443 ASSAY THYROID STIM HORMONE: CPT | Performed by: FAMILY MEDICINE

## 2019-01-01 RX ORDER — METOPROLOL SUCCINATE 50 MG/1
50 TABLET, EXTENDED RELEASE ORAL DAILY
Qty: 90 TABLET | Refills: 1 | Status: SHIPPED | OUTPATIENT
Start: 2019-01-01

## 2019-01-01 RX ORDER — AMLODIPINE BESYLATE 2.5 MG/1
TABLET ORAL
Qty: 60 TABLET | Refills: 0 | Status: SHIPPED | OUTPATIENT
Start: 2019-01-01 | End: 2019-01-01

## 2019-01-01 RX ORDER — INSULIN GLARGINE 300 U/ML
INJECTION, SOLUTION SUBCUTANEOUS
Qty: 4.5 ML | Refills: 0 | Status: SHIPPED | OUTPATIENT
Start: 2019-01-01 | End: 2019-01-01

## 2019-01-01 RX ORDER — BUDESONIDE AND FORMOTEROL FUMARATE DIHYDRATE 160; 4.5 UG/1; UG/1
1 AEROSOL RESPIRATORY (INHALATION) 2 TIMES DAILY
Qty: 3 INHALER | Refills: 1 | Status: SHIPPED | OUTPATIENT
Start: 2019-01-01

## 2019-01-01 RX ORDER — AMLODIPINE BESYLATE 2.5 MG/1
TABLET ORAL
Qty: 60 TABLET | Refills: 0 | Status: SHIPPED | OUTPATIENT
Start: 2019-01-01

## 2019-01-01 RX ORDER — PEN NEEDLE, DIABETIC 32GX 5/32"
NEEDLE, DISPOSABLE MISCELLANEOUS
Qty: 200 EACH | Refills: 0 | Status: SHIPPED | OUTPATIENT
Start: 2019-01-01

## 2019-01-01 RX ORDER — SIMVASTATIN 20 MG
TABLET ORAL
Qty: 90 TABLET | Refills: 3 | Status: SHIPPED | OUTPATIENT
Start: 2019-01-01

## 2019-01-01 RX ORDER — PEN NEEDLE, DIABETIC 32GX 5/32"
NEEDLE, DISPOSABLE MISCELLANEOUS
Qty: 200 EACH | Refills: 0 | OUTPATIENT
Start: 2019-01-01

## 2019-01-01 RX ORDER — LEVOTHYROXINE SODIUM 137 UG/1
137 TABLET ORAL DAILY
Qty: 90 TABLET | Refills: 1 | Status: SHIPPED | OUTPATIENT
Start: 2019-01-01

## 2019-01-01 RX ORDER — LOSARTAN POTASSIUM 25 MG/1
12.5 TABLET ORAL DAILY
Qty: 45 TABLET | Refills: 1 | Status: SHIPPED | OUTPATIENT
Start: 2019-01-01

## 2019-01-01 ASSESSMENT — ANXIETY QUESTIONNAIRES
5. BEING SO RESTLESS THAT IT IS HARD TO SIT STILL: NOT AT ALL
1. FEELING NERVOUS, ANXIOUS, OR ON EDGE: NOT AT ALL
6. BECOMING EASILY ANNOYED OR IRRITABLE: NOT AT ALL
2. NOT BEING ABLE TO STOP OR CONTROL WORRYING: NOT AT ALL
3. WORRYING TOO MUCH ABOUT DIFFERENT THINGS: NOT AT ALL
GAD7 TOTAL SCORE: 0
GAD7 TOTAL SCORE: 0
7. FEELING AFRAID AS IF SOMETHING AWFUL MIGHT HAPPEN: NOT AT ALL

## 2019-01-01 ASSESSMENT — PATIENT HEALTH QUESTIONNAIRE - PHQ9
SUM OF ALL RESPONSES TO PHQ QUESTIONS 1-9: 11
5. POOR APPETITE OR OVEREATING: NOT AT ALL

## 2019-01-01 ASSESSMENT — MIFFLIN-ST. JEOR: SCORE: 913.55

## 2019-01-29 NOTE — TELEPHONE ENCOUNTER
Controlled Substance Refill Request for   acetaminophen-codeine (TYLENOL #3) 300-30 MG tablet 28 tablet 0 12/31/2018     Problem List Complete:  No     PROVIDER TO CONSIDER COMPLETION OF PROBLEM LIST AND OVERVIEW/CONTROLLED SUBSTANCE AGREEMENT    Last Written Prescription Date:  12/31/18  Last Fill Quantity: 28,   # refills: 0    Last Office Visit with [facility] primary care provider: 11/9/18    Future Office visit:     Controlled substance agreement: [unfilled]    Last Urine Drug Screen: No results found for: CDAUT, No results found for: COMDAT, No results found for: THC13, PCP13, COC13, MAMP13, OPI13, AMP13, BZO13, TCA13, MTD13, BAR13, OXY13, PPX13, BUP13    [email or web address]       checked in past 3 months?  Yes 1/29/19, no concerns  Eneida Stone RN

## 2019-04-17 NOTE — PATIENT INSTRUCTIONS
My Diabetes Care Goals:    Healthy Eating: Have an egg with breakfast.  Or cheese or meat .  Protein with all meals   Use Peanut butter more often with meals and snacks.    Taking Medication: Toujeo U300 take 12 units each day  Humalog 12 units with breakfast and 10 units with lunch and 11 units with dinner.    Follow up:  Will call you with information I receive to help with costs.  Follow up with me Tuesday April 17 @ 11:30       Bring blood glucose meter and logbook with you to all doctor and follow-up appointments.     [place] [facility] for the [facility]:  For Your Diabetes Education and Nutrition Appointments Call:  [phone]   For Diabetes Education or Nutrition Related Questions:   Phone: [phone]  E-mail: [email or web address]  Fax: [phone]   If you need a medication refill please contact your pharmacy. Please allow 3 business days for your refills to be completed.    Instructions for emailing the Diabetes Educators    If you need to communicate a non-urgent message to a Diabetes Educator via email, please send to [email or web address].    Please follow the following email guidelines:    Subject line: Secure: your clinic name (example: Secure: [facility])  In the email please include: First name, middle initial, last name and date of birth.    We will be in touch with you within one (1) business day.      0 = swallows foods/liquids without difficulty

## 2019-05-14 NOTE — TELEPHONE ENCOUNTER
TC, patient due for:  OV with PCP for diabetes and atrial fib       Health Maintenance Due   Topic Date Due     ZOSTER IMMUNIZATION (2 of 3) 10/06/2009     MEDICARE ANNUAL WELLNESS VISIT  10/11/2012     COLONOSCOPY Q5 YR  08/19/2015     MICROALBUMIN Q1 YEAR  04/08/2017     LIPID MONITORING Q1 YEAR  07/15/2017     EYE EXAM Q1 YEAR  12/29/2017     FOOT EXAM Q1 YEAR  07/10/2018     TSH Q1 YEAR  04/26/2019     A1C Q6 MO  05/01/2019     COPD ACTION PLAN Q1 YR  04/26/2019     PHQ-9 Q6 MONTHS  05/09/2019     Clara Holliday RN, BSN

## 2019-05-14 NOTE — LETTER
May 14, 2019    Claudia Presley  [address]  [place] [address]    Dear Claudia,       We recently received a refill request for ULTICARE MICRO 32G X 4 MM insulin pen needle.  We have refilled this for one time only because you are due for a:    Diabetes office visit and fasting lab appointment      Please schedule this lab appointment 4-5 days prior to the office visit.     Please call at your earliest convenience so that there will not be a delay with your future refills.          Thank you,   Your [facility] Team/  [phone]

## 2019-05-25 NOTE — LETTER
May 29, 2019    Claudia Presley  [address]  [place] [address]    Dear Claudia,       We recently received a refill request for amLODIPine (NORVASC) 2.5 MG tablet.  We have refilled this for a one time 30 day supply only because you are due for a:    Diabetes and blood pressure office visit and fasting lab appointment      Please schedule this lab appointment 4-5 days prior to the office visit.     Please call at your earliest convenience so that there will not be a delay with your future refills.          Thank you,   Your [facility] Team/  [phone]

## 2019-05-29 NOTE — TELEPHONE ENCOUNTER
Medication is being filled for 1 time refill only due to:  Patient needs to be seen for fasting lab appointment and appointment with the provider for further refills. Diabetes, and hypertension.  Philomena LAUGHLINN, RN

## 2019-06-13 NOTE — PROGRESS NOTES
Need previsit labs-see orders and close encounter if nothing else needed./Anisa Bowman,       Diabetes 6/28/19

## 2019-06-22 NOTE — TELEPHONE ENCOUNTER
Central Scheduling asking for advice as she has to schedule patient for lab work and wanting to know if okay to schedule fasting labs later in morning if patient is diabetic.  FNA advised to schedule early in morning.

## 2019-06-28 NOTE — NURSING NOTE
"Chief Complaint   Patient presents with     Diabetes       Initial /69   Pulse 110   Temp 97.5  F (36.4  C) (Oral)   Resp 20   Ht 1.6 m (5' 3\")   Wt 49.4 kg (109 lb)   SpO2 92%   BMI 19.31 kg/m   Estimated body mass index is 19.31 kg/m  as calculated from the following:    Height as of this encounter: 1.6 m (5' 3\").    Weight as of this encounter: 49.4 kg (109 lb).    Philomena Marie CMA    "

## 2019-06-28 NOTE — PROGRESS NOTES
"SUBJECTIVE:  Claudia Presley, a 84 year old female scheduled an appointment to discuss the following issues:  Follow-up uncontrolled dm1, a.fib,   Sugars running high. No LOSS OF CONSCIENCE. Has glucogan pen.   No exercise. No feet changes. No chest pain. No shortness of breath.   No palpitations. Appetite down. Eats 3 meals/day. No abdominal pain.   Sleep overall ok. Emotionally doing ok?   has pill box for patient but some non-compliance issues with med. Missing days. Get's most insulin shots.   Eye exam - overdue. Rare albuterol. No SUICIAL IDEATION OR HOMOCIDAL IDEATION OR HERMELINDA.  Memory about same.   Medical, social, surgical, and family histories reviewed.    ROS:  All other ROS negative.    OBJECTIVE:  /69   Pulse 110   Temp 97.5  F (36.4  C) (Oral)   Resp 20   Ht 1.6 m (5' 3\")   Wt 49.4 kg (109 lb)   SpO2 92%   BMI 19.31 kg/m    EXAM:  GENERAL APPEARANCE: healthy, alert and no distress  EYES: EOMI,  PERRL  HENT: ear canals and TM's normal and nose and mouth without ulcers or lesions  NECK: no adenopathy, no asymmetry, masses, or scars and thyroid normal to palpation  RESP: lungs clear to auscultation - no rales, rhonchi or wheezes  CV: regular rates and rhythm, normal S1 S2, no S3 or S4 and no murmur, click or rub -  ABDOMEN:  soft, nontender, no HSM or masses and bowel sounds normal  MS: extremities normal- no gross deformities noted, no evidence of inflammation in joints, FROM in all extremities.  NEURO: Normal strength and tone, sensory exam grossly normal, mentation intact and speech normal  PSYCH: poor historian. Mildly anxious    ASSESSMENT / PLAN:  (I25.10) Coronary artery disease without angina pectoris, unspecified vessel or lesion type, unspecified whether native or transplanted heart  (primary encounter diagnosis)  Comment: stabe  Plan: per cardiology.     (I10) Essential hypertension with goal blood pressure less than 140/90  Comment: stable  Plan: amLODIPine (NORVASC) 2.5 " MG tablet, metoprolol         succinate ER (TOPROL-XL) 50 MG 24 hr tablet        Will drop clonidine with weight loss - don't want too low. Consider dropping norvasc too.     (J42) Chronic bronchitis, unspecified chronic bronchitis type (H)  Comment: stable  Plan: budesonide-formoterol (SYMBICORT) 160-4.5         MCG/ACT Inhaler        Continue mdi. Shortness of breath to er.     (E10.22) Type 1 diabetes mellitus with diabetic chronic kidney disease, unspecified CKD stage (H)  Comment: worse. Likely some non-compliance issues  Plan: insulin glargine U-300 (TOUJEO SOLOSTAR) 300         UNIT/ML injection        I'm reluctant to increase dosage with higher risk of hypoglycemia/falls. Continue insulin and compliance. More meat in diet. Consider back to dm ed yet again but patient easily confused mulitple times in past with major med changes. Recheck in 3 months  Follow-up eye exam. To er if LOSS OF CONSCIENCE/etc.     (E03.9) Hypothyroidism, unspecified type  Comment: high but likely from non-compliance  Plan: levothyroxine (SYNTHROID/LEVOTHROID) 137 MCG         tablet        Not going to increase dosage with weight loss issues. Recheck in 3 months  Pill box/comliance.     (N18.3) CKD (chronic kidney disease) stage 3, GFR 30-59 ml/min (H)  Comment: stable  Plan: losartan (COZAAR) 25 MG tablet        More water. Recheck in 3 months      (F33.1) Major depressive disorder, recurrent episode, moderate (H)  Comment: some non-compliance issues  Plan: sertraline (ZOLOFT) 50 MG tablet        Consider doubling dosage. If SUICIAL IDEATION OR HOMOCIDAL IDEATION OR HERMELINDA TO ER. Recheck in 3months.     (E78.5) Hyperlipidemia LDL goal <100  Plan: simvastatin (ZOCOR) 20 MG tablet        Stable in past.     (I48.91) Atrial fibrillation, unspecified type (H)  Comment: stable rate  Plan: high fall risk with coumadin/bleeding and deferred.  Follow-up per cardiology.     (F03.90) Dementia without behavioral disturbance, unspecified  dementia type  Comment: slowly progressive  Plan: advised again follow-up with neurology and  - patient/ deferred. Recheck in 3 months    David Finney MD

## 2022-10-04 PROBLEM — F03.90 DEMENTIA WITHOUT BEHAVIORAL DISTURBANCE (H): Status: ACTIVE | Noted: 2018-01-01

## 2023-06-28 NOTE — NURSING NOTE
"Chief Complaint   Patient presents with     Depression       Initial BP 99/67  Pulse 55  Temp 97  F (36.1  C) (Oral)  Ht 5' 3\" (1.6 m)  Wt 129 lb (58.5 kg)  SpO2 92%  BMI 22.85 kg/m2 Estimated body mass index is 22.85 kg/(m^2) as calculated from the following:    Height as of this encounter: 5' 3\" (1.6 m).    Weight as of this encounter: 129 lb (58.5 kg).  Medication Reconciliation: complete   Philomena Marie CMA    " [New Patient Visit] : a new patient visit [Referred By: _________] : Patient was referred by ARNOLDO